# Patient Record
Sex: MALE | Race: WHITE | Employment: OTHER | URBAN - METROPOLITAN AREA
[De-identification: names, ages, dates, MRNs, and addresses within clinical notes are randomized per-mention and may not be internally consistent; named-entity substitution may affect disease eponyms.]

---

## 2017-10-04 ENCOUNTER — LAB ONLY (OUTPATIENT)
Dept: INTERNAL MEDICINE CLINIC | Age: 72
End: 2017-10-04

## 2017-10-04 DIAGNOSIS — E53.8 VITAMIN B 12 DEFICIENCY: ICD-10-CM

## 2017-10-04 DIAGNOSIS — Z00.00 ROUTINE GENERAL MEDICAL EXAMINATION AT A HEALTH CARE FACILITY: Primary | ICD-10-CM

## 2017-10-04 DIAGNOSIS — Z20.5 EXPOSURE TO HEPATITIS C: ICD-10-CM

## 2017-10-04 LAB
ALBUMIN SERPL-MCNC: 4.2 G/DL (ref 3.9–5.4)
ALKALINE PHOS POC: 113 U/L (ref 38–126)
ALT SERPL-CCNC: 29 U/L (ref 9–52)
AST SERPL-CCNC: 29 U/L (ref 14–36)
BACTERIA UA POCT, BACTPOCT: NORMAL
BILIRUB UR QL STRIP: NEGATIVE
BUN BLD-MCNC: 20 MG/DL (ref 9–20)
CALCIUM BLD-MCNC: 9.3 MG/DL (ref 8.4–10.2)
CASTS UA POCT: NORMAL
CHLORIDE BLD-SCNC: 106 MMOL/L (ref 98–107)
CHOLEST SERPL-MCNC: 187 MG/DL (ref 0–200)
CLUE CELLS, CLUEPOCT: NEGATIVE
CO2 POC: 27 MMOL/L (ref 22–32)
CREAT BLD-MCNC: 0.9 MG/DL (ref 0.8–1.5)
CRYSTALS UA POCT, CRYSPOCT: NEGATIVE
EGFR (POC): 85
EPITHELIAL CELLS POCT: NORMAL
GLUCOSE POC: 83 MG/DL (ref 75–110)
GLUCOSE UR-MCNC: NEGATIVE MG/DL
GRAN# POC: 4.3 K/UL (ref 2–7.8)
GRAN% POC: 73.8 % (ref 37–92)
HCT VFR BLD CALC: 42.4 % (ref 37–51)
HDLC SERPL-MCNC: 69 MG/DL (ref 35–130)
HGB BLD-MCNC: 14 G/DL (ref 12–18)
IRON POC: 94 UG/DL (ref 49–181)
IRON SATURATION POC: 31 % (ref 15–55)
KETONES P FAST UR STRIP-MCNC: NEGATIVE MG/DL
LDL CHOLESTEROL POC: 110.4 MG/DL (ref 0–130)
LY# POC: 1.3 K/UL (ref 0.6–4.1)
LY% POC: 23.2 % (ref 10–58.5)
MCH RBC QN: 31.8 PG (ref 26–32)
MCHC RBC-ENTMCNC: 32.9 G/DL (ref 30–36)
MCV RBC: 97 FL (ref 80–97)
MID #, POC: 0.1 K/UL (ref 0–1.8)
MID% POC: 3 % (ref 0.1–24)
MUCUS UA POCT, MUCPOCT: NORMAL
PH UR STRIP: 6 [PH] (ref 5–7)
PLATELET # BLD: 171 K/UL (ref 140–440)
POTASSIUM SERPL-SCNC: 4.5 MMOL/L (ref 3.6–5)
PROT SERPL-MCNC: 6.9 G/DL (ref 6.3–8.2)
PROT UR QL STRIP: NEGATIVE MG/DL
PSA SERPL-MCNC: 0.4 NG/ML (ref 0–4)
RBC # BLD: 4.38 M/UL (ref 4.2–6.3)
RBC UA POCT, RBCPOCT: NORMAL
SODIUM SERPL-SCNC: 146 MMOL/L (ref 137–145)
SP GR UR STRIP: 1.02 (ref 1.01–1.02)
TCHOL/HDL RATIO (POC): 2.7 (ref 0–4)
TIBC POC: 306 UG/DL (ref 260–462)
TOTAL BILIRUBIN POC: 0.7 MG/DL (ref 0.2–1.3)
TRICH UA POCT, TRICHPOC: NEGATIVE
TRIGL SERPL-MCNC: 38 MG/DL (ref 0–200)
TSH BLD-ACNC: 1.81 UIU/ML (ref 0.4–4.2)
UA UROBILINOGEN AMB POC: NORMAL (ref 0.2–1)
URINALYSIS CLARITY POC: CLEAR
URINALYSIS COLOR POC: NORMAL
URINE BLOOD POC: NEGATIVE
URINE CULT COMMENT, POCT: NORMAL
URINE LEUKOCYTES POC: NEGATIVE
URINE NITRITES POC: NEGATIVE
VITAMIN D POC: 44.3 NG/ML (ref 30–96)
VLDLC SERPL CALC-MCNC: 7.6 MG/DL
WBC # BLD: 5.7 K/UL (ref 4.1–10.9)
WBC UA POCT, WBCPOCT: 0
YEAST UA POCT, YEASTPOC: NEGATIVE

## 2017-10-05 LAB
HCV AB S/CO SERPL IA: <0.1 S/CO RATIO (ref 0–0.9)
VIT B12 SERPL-MCNC: >2000 PG/ML (ref 211–946)

## 2017-10-10 ENCOUNTER — OFFICE VISIT (OUTPATIENT)
Dept: INTERNAL MEDICINE CLINIC | Age: 72
End: 2017-10-10

## 2017-10-10 VITALS
WEIGHT: 155 LBS | OXYGEN SATURATION: 92 % | TEMPERATURE: 98.2 F | SYSTOLIC BLOOD PRESSURE: 152 MMHG | DIASTOLIC BLOOD PRESSURE: 88 MMHG | HEART RATE: 70 BPM

## 2017-10-10 DIAGNOSIS — Z00.00 ANNUAL PHYSICAL EXAM: ICD-10-CM

## 2017-10-10 DIAGNOSIS — M48.56XS COMPRESSION FRACTURE OF LUMBAR SPINE, NON-TRAUMATIC, SEQUELA: Primary | ICD-10-CM

## 2017-10-10 DIAGNOSIS — M80.00XS AGE-RELATED OSTEOPOROSIS WITH CURRENT PATHOLOGICAL FRACTURE, SEQUELA: ICD-10-CM

## 2017-10-10 PROBLEM — M80.00XA AGE-RELATED OSTEOPOROSIS WITH CURRENT PATHOLOGICAL FRACTURE: Status: ACTIVE | Noted: 2017-10-10

## 2017-10-10 NOTE — PROGRESS NOTES
Mr. Charma Hamman is a 67year old white male who comes to the office today for an initial visit and a comprehensive medical evaluation. Past Medical History:  1. Parkinson's disease, diagnosed in . 2. Mitral valve prolapse, diagnosed by echocardiography. 3. Recurrent prostatitis, followed annually by Dr. Sushma Lozano. 4. He is status post appendectomy. 5. He is followed annually by Dr. Sabra Horta and has had several skin cancers removed, including Moh's procedure by Dr. Warden Villalobos for a basal cell cancer on his nose. 6. He sustained a compression fracture of L2 and L4 in 2016 while doing yoga. He was seen by Dr. Magaly Melendez and Dr. Kelly Hernandez. I reviewed his MRI and it said it was an osteoporotic pathological fracture. Current Medications:  1. Trihexyphenidyl 2 mg t.i.d.  2. Vitamin D 5,000 units daily. 3. Milk thistle 250 mg daily. 4. Turmeric 1,000 mg daily. 5. Plankton, one daily. 6. Vitamin B12, one daily. 7. Glutathione nasal spray 200 mg t.i.d.  8. Curcumin 1,000 mg daily. 9. Saw palmetto, one daily. 10. Lutein, one daily. 11. Selenium, one daily. 12. Aspirin 81 mg daily. Drug Allergies:  None. Social History:  He has a masters degree in forestry from [employer]. He has an undergraduate degree from [employer]. He is semi retired. He was in the [employer]. He also worked for [employer] for a brief time. He used to smoke a pipe, but quit many years ago as he was an avid runner and a marathon runner at times. He used to have a cocktail every night, but quit that when he was diagnosed with Parkinson's on the advice of his neurologist.  He is . He has one son, age 36, and one granddaughter. Family History:  His father was diagnosed with Parkinson's disease at age 72 and  from complications of same at age 80. His mother  at age 80. She'd had colon surgery for colon cancer 20 years prior. Mr. Charma Hamman is an only child.     Review of Systems:  Mr. Charma Hamman has been getting regularly colonoscopies, last one was done by Dr. Ember Tejeda. He is due for one now and plans to schedule it. His back pain no longer bothers him. As part of his physical therapy regimen for yoga, he does 8-12 60 yard sprints a day and does yoga. He's also getting ready to start a program called Sweating Out Parkinson's Disease. He denies exertional chest pain, chest pressure, chest tightness, shortness of breath, PND, orthopnea, ankle edema or palpitations. When he doesn't have prostatitis his voiding is normal.  He doesn't have dysphagia. He's lost about 20 pounds since being diagnosed with Parkinson's disease. Physical Examination:  GENERAL:  HT: 5'10\". WT: 155. BP: 152/88 initially, subsequently 130/80. T: 98.2. P: 70 and regular. O2 saturation on room air: 92%. He is an elderly white male with resting tremor of his hands and masked-like facies of Parkinson's.  he is awake, alert, oriented x four and in no distress. HEENT:  Head normocephalic, atraumatic. Eyes - pupils midrange, equal directly and consensually. Extraocular movements are normal.  Ears show bilateral cerumen impaction. Throat is normal.  NECK:  Without JVD, adenopathy, thyromegaly or carotid bruits. LUNGS:  Clear. HEART:   Quiet precordium. Regular rhythm. There is a prominent midsystolic ejection click at the LLSB. There is no associated murmur. ABDOMEN:  Soft, non tender. No detectable hepatosplenomegaly, no abdominal masses, bruits or ascites. RECTAL:  Examination deferred to Dr. Iam Peraza. EXTREMITIES:  Without edema. Pulses are normal.  SKIN:  No suspicious lesions. NEUROLOGICAL:  He has a severe resting tremor. He has cogwheel rigidity. He has masked-like facies. Gait is shuffling. All of these are consistent with moderately severe Parkinson's. Studies:  PFT is normal.  Hearing testing is relatively normal.  EKG - normal sinus rhythm, first degree heart block, pr interval= 244.  Tracing is otherwise normal.  I have no prior tracing available for comparison. Laboratory:  HGB and iron stores are normal.  Blood sugar, potassium, renal function, calcium level and LFTs are normal.  Total cholesterol 187, triglycerides 38, , HDL 69. PSA 0.4. Vitamin D 44. TSH 1.8. Vitamin B12 level is greater than 2,000. Hepatitis C virus antibody negative. Impression:  1. Advanced Parkinson's disease. 2. Osteoporosis with nontraumatic compression fractures L2, 3, 4. (Xrays of lumbar spine today show healed compressions of 2, 3 and 4.)  3. Status post excision multiple skin cancers. 4. Mitral valve prolapse, asymptomatic. 5. Recurrent prostatitis  6. Status post appendectomy. Plan:  1. Request complete records from Dr. Fermin Bell. I'll review those records and check on his immunization status. 2. He's gotten the paperwork from Dr. Loraine Hernandez office for the colonoscopy. I told him to go ahead and fill it out and mail it in and ask for Dr. John Suresh. 3. He needs a bone density test and likely will need treatment. Will make arrangements for that once our equipment is functional..  4. We talked a bit about conventional treatment for Parkinson's with Sinemet. He says he has a prescription, but he's never taken it. I encouraged him to try it. 5. Will schedule a follow up visit after I receive his old medical records.

## 2017-10-10 NOTE — PROGRESS NOTES
Patient is new to this practice   Reviewed record in preparation for visit and have obtained necessary documentation. Identified pt with two pt identifiers(name and ). Chief Complaint   Patient presents with    Annual Exam        Coordination of Care Questionnaire:  :     1) Have you been to an emergency room, urgent care clinic since your last visit? No     Hospitalized since your last visit? No             2) Have you seen or consulted any other health care providers outside of [address] [place] [address] since your last visit?  No

## 2017-10-10 NOTE — LETTER
10/10/2017 4:54 PM 
 
Mr. Michela Denise 
[address] [facility] [address] [facility] [address] Dear Mr. Azul Callahan: I am writing this letter as a follow-up to your recent physical examination. I have enclosed copies of your lab work for your review. After going over this information, if you have any questions please give me a call. Your current active and past medical problems include: 1. Parkinson's disease, diagnosed in 2014. 2. Mitral valve prolapse, which is asymptomatic, diagnosed by auscultation and echocardiography. 3. Recurrent prostatitis, for which you are followed by Dr. Saloni Sarah. 4. You had an appendectomy as a child. 5. You've had several skin cancers removed by Dr. Jennifer Anaya and Dr. Beryl Dillard and are followed on a regular basis. 6. Osteoporosis with nontraumatic compression fractures of the second, third and fourth lumbar vertebra. Juanis Damon Your current medications are: 1. Trihexyphenidyl 2 mg t.i.d. 2. Glutathione nasal spray 200 mg three times daily. 3. You are on a variety of supplements, including curcumin 1,000 mg daily, saw palmetto, one daily, lutein, one daily, selenium, one daily, vitamin D 5,000 units daily, milk thistle 250 mg daily, turmeric 1,000 mg daily, jessica plankton, one daily, aspirin 81 mg daily. You have been taking a B12 supplement every day, but your B12 level is quite high and I think you can discontinue that. On physical examination, your height was 5'10\". Your weight was 155. Your blood pressure was initially slightly high, but when I checked it it was perfectly normal at 130/80. Your pulse was 60 and regular. Abnormalities on your physical examination relate to your mitral valve, which does have a loud click when I listen to it. There is no associated murmur, so the mitral valve is functioning normally. This is not causing you any symptoms and requires no further evaluation. You also have fairly typical changes of moderate Parkinson's disease.  Your resting electrocardiogram was normal except you have something called a first degree heart block. That is related to aging of the electrical system of your heart. That is not causing you any symptoms and requires no treatment at this time. Pulmonary function testing was normal.  Hearing testing shows a mild symmetrical high frequency hearing loss. Your laboratory studies revealed your hemoglobin and iron stores to be normal.  Your blood sugar, potassium, kidney function, calcium level and liver function tests were normal.  Total cholesterol was 187, triglycerides were 38, LDL (bad cholesterol) was 110 and HDL (good cholesterol) was 69. That is a low risk profile. An HDL level over 60 is what is called positive risk factor for heart disease in that it protects your heart from accumulating plaque. PSA, (test for prostate cancer), is quite low at 0.4. Vitamin D level is in the physiologic range at 44. Thyroid function is normal. Hepatitis C antibody is negative. Your B12 level is over 2,000 and as noted above you can stop that supplement. Your urine specimen is normal. 
 
You are overdue for your colonoscopy, so go ahead and do the paperwork and request Dr. Leyla Marinelli. That should be done every five years as long as it's normal because of your family history. Your MRI from 2016 said you are osteoporotic. We need to quantify the osteoporosis by doing a bone density test. Once my machine is functional we will get that done as soon as possible and start you on treatment. I have requested records from Dr. Ana Wesley. I will review that, check your immunization status and make further recommendations in that regard. As we discussed in the office, I think it would greatly benefit you to take the Sinemet tablets that you have. It would delay progression of your symptoms. I certainly think you should continue this vigorous physical regimen that you are doing.   I am certain that is helping you as well.  Thank you for your confidence in joining the practice. I will be in touch with you after I receive your records and review them. Sincerely, Kory Villalba MD

## 2017-10-31 ENCOUNTER — TELEPHONE (OUTPATIENT)
Dept: INTERNAL MEDICINE CLINIC | Age: 72
End: 2017-10-31

## 2017-11-06 ENCOUNTER — TELEPHONE (OUTPATIENT)
Dept: INTERNAL MEDICINE CLINIC | Age: 72
End: 2017-11-06

## 2017-11-06 NOTE — TELEPHONE ENCOUNTER
Patient informed  Needs prevnar 13  Flu vaccine  Zostavax   Appointment scheduled for Dec 6   Xray will call patient to schedule bone density when macine is set up

## 2017-12-08 ENCOUNTER — OFFICE VISIT (OUTPATIENT)
Dept: INTERNAL MEDICINE CLINIC | Age: 72
End: 2017-12-08

## 2017-12-08 VITALS
HEART RATE: 56 BPM | TEMPERATURE: 97.8 F | WEIGHT: 153 LBS | SYSTOLIC BLOOD PRESSURE: 186 MMHG | DIASTOLIC BLOOD PRESSURE: 90 MMHG | HEIGHT: 72 IN | BODY MASS INDEX: 20.72 KG/M2

## 2017-12-08 DIAGNOSIS — I34.1 MVP (MITRAL VALVE PROLAPSE): ICD-10-CM

## 2017-12-08 DIAGNOSIS — M80.00XS AGE-RELATED OSTEOPOROSIS WITH CURRENT PATHOLOGICAL FRACTURE, SEQUELA: ICD-10-CM

## 2017-12-08 DIAGNOSIS — M48.56XS COMPRESSION FRACTURE OF LUMBAR SPINE, NON-TRAUMATIC, SEQUELA: ICD-10-CM

## 2017-12-08 DIAGNOSIS — G20 PARKINSON'S DISEASE (TREMOR, STIFFNESS, SLOW MOTION, UNSTABLE POSTURE) (HCC): ICD-10-CM

## 2017-12-08 DIAGNOSIS — Z23 ENCOUNTER FOR IMMUNIZATION: Primary | ICD-10-CM

## 2017-12-08 NOTE — PROGRESS NOTES
Chief Complaint   Patient presents with    Tremors    Immunization/Injection     1. Have you been to the ER, urgent care clinic since your last visit? Hospitalized since your last visit? NO    2. Have you seen or consulted any other health care providers outside of the [address] [place] [address] since your last visit? Include any pap smears or colon screening.  NO

## 2017-12-08 NOTE — PROGRESS NOTES
Mr. Homar Perez comes into the office today in follow-up of:  1. Parkinson's disease. 2. Hypertension. 3. Osteoporosis with history of lumbar vertebral compression fractures. He remains on a vigorous physical schedule. He would like to begin a program that includes non-contact boxing. He has not scheduled his colonoscopy yet but plans to. Physical Examination:  T: 97.8. BP: initially 186/90; I got 130/80. I believe his severe tremor affects the automatic blood pressure cuff. P: 56 and regular. W: 153 lb. CHEST:  Lungs are clear. Heart regular rhythm. There is a mid-systolic ejection click and now there is an associated 2/6 mitral insufficiency murmur. NEURO:  Neurological examination is unchanged. Plan:    1. We have scheduled a bone density test for January 10th. I will call him with the results and make further recommendations. I have suggested that we do an echocardiogram to further evaluate the mitral valve and he is in agreement. 2. I signed the form to allow him to do the boxing class. 3. After I review the bone density test, I will call him an arrange follow-up. 4. Flu vaccine and [id number] given today.

## 2018-01-31 NOTE — PERIOP NOTES
[facility]  Ambulatory Surgery Unit  Pre-operative Instructions for Endo Procedures    Procedure Date  2/7/18            Tentative Arrival Time 0730      1. On the day of your procedure, please report to the Ambulatory Surgery Unit Registration Desk and sign in at your designated time. The Ambulatory Surgery Unit is located in [facility] on the [facility] side of the [facility] across from the [address] [place] [address]. Please have all of your health insurance cards and a photo ID. 2. You must have someone with you to drive you home, as you should not drive a car for 24 hours following anesthesia. Please make arrangements for a responsible adult friend or family member to stay with you for at least the first 24 hours after your procedure. 3. Do not have anything to eat or drink (including water, gum, mints, coffee, juice) after midnight   2/6/18. This may not apply to medications prescribed by your physician. (Please note below the special instructions with medications to take the morning of your procedure.)    4. If applicable, follow the clear liquid diet and bowel prep instructions provided by your physician's office. If you do not have this information, or have any questions, please contact your physician's office. 5. We recommend you do not drink any alcoholic beverages for 24 hours before and after your procedure. 6. Contact your surgeons office for instructions on the following medications: non-steroidal anti-inflammatory drugs (i.e. Advil, Aleve), vitamins, and supplements. (Some surgeons will want you to stop these medications prior to surgery and others may allow you to take them)   **If you are currently taking Plavix, Coumadin, Aspirin and/or other blood-thinning agents, contact your surgeon for instructions. ** Your surgeon will partner with the physician prescribing these medications to determine if it is safe to stop or if you need to continue taking.  Please do not stop taking these medications without instructions from your surgeon. 7. In an effort to help prevent surgical site infection, we ask that you shower with an anti-bacterial soap (i.e. Dial or Safeguard) on the morning of your procedure. Do not apply any lotions, powders, or deodorants after showering. 8. Wear comfortable clothes. Wear glasses instead of contacts. Do not bring any jewelry or money (other than copays or fees as instructed). Do not wear make-up, particularly mascara, the morning of your procedure. Wear your hair loose or down, no ponytails, buns, kamari pins or clips. All body piercings must be removed. 9. You should understand that if you do not follow these instructions your procedure may be cancelled. If your physical condition changes (i.e. fever, cold or flu) please contact your surgeon as soon as possible. 10. It is important that you be on time. If a situation occurs where you may be late, or if you have any questions or problems, please call [phone]. 11. Your procedure time may be subject to change. You will receive a phone call the day prior to confirm your arrival time. Special Instructions: Take all medications and inhalers, as prescribed, on the morning of surgery with a sip of water EXCEPT: none      I understand a pre-operative phone call will be made to verify my procedure time. In the event that I am not available, I give permission for a message to be left on my answering service and/or with another person?       Yes     (instructions given verbally during phone assessment- pt voiced understanding)     ___________________      ___________________      ___________________  (Signature of Patient)          (Witness)                   (Date and Time)

## 2018-02-06 ENCOUNTER — ANESTHESIA EVENT (OUTPATIENT)
Dept: SURGERY | Age: 73
End: 2018-02-06
Payer: MEDICARE

## 2018-02-07 ENCOUNTER — HOSPITAL ENCOUNTER (OUTPATIENT)
Age: 73
Setting detail: OUTPATIENT SURGERY
Discharge: HOME | End: 2018-02-07
Attending: COLON & RECTAL SURGERY | Admitting: COLON & RECTAL SURGERY
Payer: MEDICARE

## 2018-02-07 ENCOUNTER — ANESTHESIA (OUTPATIENT)
Dept: SURGERY | Age: 73
End: 2018-02-07
Payer: MEDICARE

## 2018-02-07 VITALS
RESPIRATION RATE: 17 BRPM | OXYGEN SATURATION: 97 % | TEMPERATURE: 98 F | WEIGHT: 156.2 LBS | DIASTOLIC BLOOD PRESSURE: 74 MMHG | BODY MASS INDEX: 21.16 KG/M2 | HEIGHT: 72 IN | HEART RATE: 62 BPM | SYSTOLIC BLOOD PRESSURE: 104 MMHG

## 2018-02-07 PROBLEM — Z80.0 FAMILY HISTORY OF COLON CANCER REQUIRING SCREENING COLONOSCOPY: Status: ACTIVE | Noted: 2018-02-07

## 2018-02-07 PROBLEM — S91.339A: Status: ACTIVE | Noted: 2018-02-07

## 2018-02-07 PROCEDURE — 76030000000 HC AMB SURG OR TIME 0.5 TO 1: Performed by: COLON & RECTAL SURGERY

## 2018-02-07 PROCEDURE — 76060000061 HC AMB SURG ANES 0.5 TO 1 HR: Performed by: COLON & RECTAL SURGERY

## 2018-02-07 PROCEDURE — 74011250636 HC RX REV CODE- 250/636: Performed by: ANESTHESIOLOGY

## 2018-02-07 PROCEDURE — 77030021352 HC CBL LD SYS DISP COVD -B: Performed by: COLON & RECTAL SURGERY

## 2018-02-07 PROCEDURE — 76210000050 HC AMBSU PH II REC 0.5 TO 1 HR: Performed by: COLON & RECTAL SURGERY

## 2018-02-07 PROCEDURE — 74011000250 HC RX REV CODE- 250

## 2018-02-07 PROCEDURE — 77030020255 HC SOL INJ LR 1000ML BG: Performed by: COLON & RECTAL SURGERY

## 2018-02-07 PROCEDURE — 74011250636 HC RX REV CODE- 250/636

## 2018-02-07 RX ORDER — CEPHALEXIN 250 MG/1
500 CAPSULE ORAL 4 TIMES DAILY
Qty: 56 CAP | Refills: 0 | Status: SHIPPED | OUTPATIENT
Start: 2018-02-07 | End: 2018-02-14

## 2018-02-07 RX ORDER — SODIUM CHLORIDE, SODIUM LACTATE, POTASSIUM CHLORIDE, CALCIUM CHLORIDE 600; 310; 30; 20 MG/100ML; MG/100ML; MG/100ML; MG/100ML
25 INJECTION, SOLUTION INTRAVENOUS CONTINUOUS
Status: DISCONTINUED | OUTPATIENT
Start: 2018-02-07 | End: 2018-02-07 | Stop reason: HOSPADM

## 2018-02-07 RX ORDER — DIPHENHYDRAMINE HYDROCHLORIDE 50 MG/ML
12.5 INJECTION, SOLUTION INTRAMUSCULAR; INTRAVENOUS AS NEEDED
Status: DISCONTINUED | OUTPATIENT
Start: 2018-02-07 | End: 2018-02-07 | Stop reason: HOSPADM

## 2018-02-07 RX ORDER — SODIUM CHLORIDE 0.9 % (FLUSH) 0.9 %
5-10 SYRINGE (ML) INJECTION AS NEEDED
Status: DISCONTINUED | OUTPATIENT
Start: 2018-02-07 | End: 2018-02-07 | Stop reason: HOSPADM

## 2018-02-07 RX ORDER — PROPOFOL 10 MG/ML
INJECTION, EMULSION INTRAVENOUS AS NEEDED
Status: DISCONTINUED | OUTPATIENT
Start: 2018-02-07 | End: 2018-02-07 | Stop reason: HOSPADM

## 2018-02-07 RX ORDER — CEPHALEXIN 250 MG/1
500 CAPSULE ORAL 4 TIMES DAILY
Qty: 28 CAP | Refills: 0 | Status: SHIPPED | OUTPATIENT
Start: 2018-02-07 | End: 2018-02-07

## 2018-02-07 RX ORDER — PHENYLEPHRINE HCL IN 0.9% NACL 0.4MG/10ML
SYRINGE (ML) INTRAVENOUS AS NEEDED
Status: DISCONTINUED | OUTPATIENT
Start: 2018-02-07 | End: 2018-02-07 | Stop reason: HOSPADM

## 2018-02-07 RX ORDER — LIDOCAINE HYDROCHLORIDE 20 MG/ML
INJECTION, SOLUTION EPIDURAL; INFILTRATION; INTRACAUDAL; PERINEURAL AS NEEDED
Status: DISCONTINUED | OUTPATIENT
Start: 2018-02-07 | End: 2018-02-07 | Stop reason: HOSPADM

## 2018-02-07 RX ORDER — FENTANYL CITRATE 50 UG/ML
25 INJECTION, SOLUTION INTRAMUSCULAR; INTRAVENOUS
Status: DISCONTINUED | OUTPATIENT
Start: 2018-02-07 | End: 2018-02-07 | Stop reason: HOSPADM

## 2018-02-07 RX ORDER — LIDOCAINE HYDROCHLORIDE 10 MG/ML
0.1 INJECTION, SOLUTION EPIDURAL; INFILTRATION; INTRACAUDAL; PERINEURAL AS NEEDED
Status: DISCONTINUED | OUTPATIENT
Start: 2018-02-07 | End: 2018-02-07 | Stop reason: HOSPADM

## 2018-02-07 RX ORDER — SODIUM CHLORIDE 0.9 % (FLUSH) 0.9 %
5-10 SYRINGE (ML) INJECTION EVERY 8 HOURS
Status: DISCONTINUED | OUTPATIENT
Start: 2018-02-07 | End: 2018-02-07 | Stop reason: HOSPADM

## 2018-02-07 RX ORDER — ONDANSETRON 2 MG/ML
4 INJECTION INTRAMUSCULAR; INTRAVENOUS AS NEEDED
Status: DISCONTINUED | OUTPATIENT
Start: 2018-02-07 | End: 2018-02-07 | Stop reason: HOSPADM

## 2018-02-07 RX ADMIN — Medication 120 MCG: at 09:38

## 2018-02-07 RX ADMIN — PROPOFOL 100 MG: 10 INJECTION, EMULSION INTRAVENOUS at 09:22

## 2018-02-07 RX ADMIN — PROPOFOL 50 MG: 10 INJECTION, EMULSION INTRAVENOUS at 09:39

## 2018-02-07 RX ADMIN — SODIUM CHLORIDE, SODIUM LACTATE, POTASSIUM CHLORIDE, AND CALCIUM CHLORIDE 25 ML/HR: 600; 310; 30; 20 INJECTION, SOLUTION INTRAVENOUS at 08:35

## 2018-02-07 RX ADMIN — LIDOCAINE HYDROCHLORIDE 40 MG: 20 INJECTION, SOLUTION EPIDURAL; INFILTRATION; INTRACAUDAL; PERINEURAL at 09:22

## 2018-02-07 NOTE — INTERVAL H&P NOTE
H&P Update:  Joaquina Telles was seen and examined. History and physical has been reviewed. Significant clinical changes have occurred as noted:  ENT clear;  Lungs clear;  Cor without failure; Abdomen benign;  Rectal pending c-scope; Extremities significant for stepping on a nail left forefoot a couple days ago, with some mild erythema around the puncture site -- states he is up to date on tetanus, but should get ABO to cover the site -- will prescribe today at discharge;  Neuro with Parkinson tremor.     Signed By: Bere Allen MD     February 7, 2018 8:15 AM

## 2018-02-07 NOTE — DISCHARGE INSTRUCTIONS
Nydia Bowser  [id number]  1945    COLON DISCHARGE INSTRUCTIONS  Discomfort:  Redness at IV site- apply warm compress to area; if redness or soreness persist- contact your physician  There may be a slight amount of blood passed from the rectum  Gaseous discomfort- walking, belching will help relieve any discomfort  You may not operate a vehicle for 24 hours  You may not engage in an occupation involving machinery or appliances for rest of today  You may not drink alcoholic beverages for at least 24 hours  Avoid making any critical decisions for at least 24 hour  DIET:   Regular diet. - however -  remember your colon is empty and a heavy meal will produce gas. Avoid these foods:  vegetables, fried / greasy foods, carbonated drinks for today     ACTIVITY:  You may not  resume your normal daily activities it is recommended that you spend the remainder of the day resting -  avoid any strenuous activity. CALL M.D. ANY SIGN OF:   Increasing pain, nausea, vomiting  Abdominal distension (swelling)  New increased bleeding (oral or rectal)  Fever (chills)  Pain in chest area  Bloody discharge from nose or mouth  Shortness of breath    You may  take any Advil, Aspirin, Ibuprofen, Motrin, Aleve, or Goodys or Tylenol as needed for pain. Post procedure diagnosis:  Diverticulosis but overall negative exam  Follow-up Instructions:   Call Dr. Rayna Cobb if any questions  Telephone #  [phone]  Additional instructions ; Keflex 2 capsules 4 times each day for one week;  Consider next colonoscopy in 5 years.                   Nydia Bowser  [id number]  1945        DISCHARGE SUMMARY from Nurse    The following personal items collected during your admission are returned to you:   Dental Appliance: Dental Appliances: None  Vision: Visual Aid: Glasses, At home  Hearing Aid:    Jewelry:    Clothing:    Other Valuables:    Valuables sent to safe:      DO NOT TAKE TYLENOL/ACETAMINOPHEN WITH PERCOCET, LORTAB, [address] [place]. TAKE NARCOTIC PAIN MEDICATIONS WITH FOOD     If given 2 pain narcotics do NOT take together! Narcotics tend to be constipating, we suggest taking a stool softener such as Colace or Miralax (follow package instructions). DO NOT DRIVE WHILE TAKING NARCOTIC PAIN MEDICATIONS. DO NOT TAKE SLEEPING MEDICATIONS OR ANTIANXIETY MEDICATIONS WHILE TAKING NARCOTIC PAIN MEDICATIONS,  ESPECIALLY THE NIGHT OF ANESTHESIA. CPAP PATIENTS BE SURE TO WEAR MACHINE WHENEVER NAPPING OR SLEEPING. DISCHARGE SUMMARY from Nurse    The following personal items collected during your admission are returned to you:   Dental Appliance: Dental Appliances: None  Vision: Visual Aid: Glasses, At home  Hearing Aid:    Jewelry:    Clothing:    Other Valuables:    Valuables sent to safe:        PATIENT INSTRUCTIONS:    After General Anesthesia or Intravenous Sedation, for 24 hours or while taking prescription Narcotics:        Someone should be with you for the next 24 hours. For your own safety, a responsible adult must drive you home. · Limit your activities  · Recommended activity: Rest today, up with assistance today. Do not climb stairs or shower unattended for the next 24 hours. · Please start with a soft bland diet and advance as tolerated (no nausea) to regular diet. · If you have a sore throat you should try the following: fluids, warm salt water gargles, or throat lozenges. If it does not improve after several days please follow up with your primary physician. · Do not drive and operate hazardous machinery  · Do not make important personal or business decisions  · Do  not drink alcoholic beverages  · If you have not urinated within 8 hours after discharge, please contact your surgeon on call.     Report the following to your surgeon:  · Excessive pain, swelling, redness or odor of or around the surgical area  · Temperature over 100.5  · Nausea and vomiting lasting longer than 4 hours or if unable to take medications  · Any signs of decreased circulation or nerve impairment to extremity: change in color, persistent  numbness, tingling, coldness or increase pain      · You will receive a Post Operative Call from one of the Recovery Room Nurses on the day after your surgery to check on you. It is very important for us to know how you are recovering after your surgery. If you have an issue or need to speak with someone, please call your surgeon, do not wait for the post operative call. · You may receive an e-mail or letter in the mail from [facility] regarding your experience with us in the Ambulatory Surgery Unit. Your feedback is valuable to us and we appreciate your participation in the survey. · If the above instructions are not adequate, please contact Rupali Rhodes RN, [facility] anesthesia Nurse Manager or our Anesthesiologist, at [phone]. If you are having problems after your surgery, call the physician at his office number. · We wish you a speedy recovery ? What to do at Home:      *  Please give a list of your current medications to your Primary Care Provider. *  Please update this list whenever your medications are discontinued, doses are      changed, or new medications (including over-the-counter products) are added. *  Please carry medication information at all times in case of emergency situations. These are general instructions for a healthy lifestyle:    No smoking/ No tobacco products/ Avoid exposure to second hand smoke    Surgeon General's Warning:  Quitting smoking now greatly reduces serious risk to your health.     Obesity, smoking, and sedentary lifestyle greatly increases your risk for illness    A healthy diet, regular physical exercise & weight monitoring are important for maintaining a healthy lifestyle    You may be retaining fluid if you have a history of heart failure or if you experience any of the following symptoms:  Weight gain of 3 pounds or more overnight or 5 pounds in a week, increased swelling in our hands or feet or shortness of breath while lying flat in bed. Please call your doctor as soon as you notice any of these symptoms; do not wait until your next office visit. Recognize signs and symptoms of STROKE:    B - Balance  E - Eyes    F-  Face looks uneven    A-  Arms unable to move or move even    S-  Speech slurred or non-existent    T-  Time-call 911 as soon as signs and symptoms begin-DO NOT go       Back to bed or wait to see if you get better-TIME IS BRAIN. If you have not received your influenza and/or pneumococcal vaccine, please follow up with your primary care physician. The discharge information has been reviewed with the patient and caregiver. The patient and caregiver verbalized understanding.

## 2018-02-07 NOTE — PERIOP NOTES
1000 Pt arrived to PACU. Pt drowsy. C/o urge to have a bowel movement. He wants to get up and use the bathroom. Advised him to wait unitl he is more awake. Friend brought to bedside. 1005 Some loose stool in his bed. Pt brought to bathroom and cleaned up. He was able to void. [address] instructions reviewed with ride. Abd soft. Pt meets PACU protocol.

## 2018-02-07 NOTE — PERIOP NOTES
Lora Ruddy  1945  [id number]    Situation:  Verbal report given from: A. [employer] [place] [employer] CRNA  Procedure: Procedure(s):  COLONOSCOPY    Background:    Preoperative diagnosis: FAMILY HX OF COLON CANCER    Postoperative diagnosis: Diverticulosis but overall negative exam    :  Dr. Jennifer Gage    Assistant(s): Circ-1: Mee Cnode  Circ-2: Jefferson Devlin RN  Scrub Tech-1: Jed Vance    Specimens: * No specimens in log *    Assessment:  Intra-procedure medications   Propofol  mg      Anesthesia gave intra-procedure sedation and medications, see anesthesia flow sheet     Intravenous fluids: LR@ KVO     Vital signs stable     Abdominal assessment: round and soft       Recommendation:    Permission to share finding with family or friend yes    All side rails up, bed in low position, wheels locked. Nurse at bedside.

## 2018-02-07 NOTE — H&P
[facility]  ACUTE CARE HISTORY AND PHYSICAL    Name:VIGNESH Tejeda  MR#: [id number]  : 1945  ACCOUNT #: [de-identified]   DATE OF SERVICE: 2018    CHIEF COMPLAINT:  Family history of colon cancer, now for followup 5-year interval exam.    HISTORY OF PRESENT ILLNESS:  The patient is a very nice 68-year-old gentleman who is in today for followup screening colonoscopy. His last exam was 5 years ago. He has a history of colon cancer in his family. He has been apprised of the risks and would like to proceed, and we will move forward with exam today. PAST MEDICAL HISTORY:  Significant for parkinsonism. He has also had a tonsillectomy in addition to his appendectomy. He has a history of Parkinson's disease. MEDICATIONS:  None. ALLERGIES:  NONE. SOCIAL HISTORY:  Significant for no alcohol. He has not smoked for decades. FAMILY HISTORY:  Significant for colon cancer in his mother, Parkinson's disease in his father. REVIEW OF SYSTEMS:  Significant for some sore muscles. He has had a 20-pound weight loss. Otherwise, his 10-system review is negative. PHYSICAL EXAMINATION:  Pending. ASSESSMENT:  A 68-year-old gentleman for screening colonoscopy with a family history of colon cancer. There is a 5-year interval exam.    RECOMMENDATIONS AND PLAN:  He is aware of the risks of colonoscopy including bleeding, perforation, and the risk of missing small polyps. He understands and is agreeable to proceed, and we will move forward with the exam today.       MD FANG Lambert / Sandeep Patel  D: 2018 23:47     T: 2018 00:08  JOB #: [id number]

## 2018-02-07 NOTE — OP NOTES
[facility]  OPERATIVE REPORT    Name:VIGNESH Watson  MR#: [id number]  : 1945  ACCOUNT #: [de-identified]   DATE OF SERVICE: 2018    PREOPERATIVE DIAGNOSIS:  Family history of colon cancer, now for 5-year interval exam.    POSTOPERATIVE DIAGNOSIS:  Family history of colon cancer, now for 5-year interval exam.  No evidence of any new colonic pathology. PROCEDURE PERFORMED:  Total colonoscopy to cecum. SURGEON:  Dr. Francesca Bumpers. ANESTHESIA:  IV sedation with propofol per anesthesia. ESTIMATED BLOOD LOSS:  None. SPECIMENS REMOVED:  None. COMPLICATIONS:  None. INDICATIONS:  The patient is a very nice 70-year-old gentleman with a history of Parkinson's. He is in today for screening colonoscopy as a 5-year interval for a family history of colonic polyps. He is aware of the risks of the procedure including bleeding, perforation and the risk of missing small polyps and he is agreeable to proceed. PROCEDURE:  After uneventful induction of IV sedation, the patient was placed in the left lateral position and the pediatric 180 stiffening scope passed through the colon to the cecal cap without difficulty. Prep was marginal, but adequate. The cecum and ileocecal valve were identified and the scope was slowly and carefully pulled back. The ascending, transverse, descending, and sigmoid were normal.  No polyps or diverticula were seen. No inflammation was present. The scope was pulled back to the rectum, which was unremarkable. Air was aspirated, scope was removed and the exam terminated. He tolerated the exam well, remained stable and left with a benign abdomen. He ought to undergo a followup exam in 5 years if his general physical condition from his Parkinson's allows.       MD FANG Polanco / SRINIVASAN  D: 2018 10:06     T: 2018 11:57  JOB #: [id number]  CC: Mary Grace Howard MD

## 2018-02-07 NOTE — ANESTHESIA PREPROCEDURE EVALUATION
Anesthetic History   No history of anesthetic complications            Review of Systems / Medical History  Patient summary reviewed, nursing notes reviewed and pertinent labs reviewed    Pulmonary  Within defined limits                 Neuro/Psych             Comments: Parkinson's Dz Cardiovascular      Valvular problems/murmurs: mitral insufficiency            Exercise tolerance: >4 METS  Comments: 12/17 ECHO = EF 65%, moderate MR   GI/Hepatic/Renal  Within defined limits              Endo/Other  Within defined limits           Other Findings              Physical Exam    Airway  Mallampati: II  TM Distance: > 6 cm  Neck ROM: decreased range of motion   Mouth opening: Normal     Cardiovascular    Rhythm: regular  Rate: normal         Dental    Dentition: Caps/crowns     Pulmonary  Breath sounds clear to auscultation               Abdominal  GI exam deferred       Other Findings            Anesthetic Plan    ASA: 3  Anesthesia type: general and total IV anesthesia          Induction: Intravenous  Anesthetic plan and risks discussed with: Patient

## 2018-02-07 NOTE — BRIEF OP NOTE
BRIEF OPERATIVE NOTE    Date of Procedure: 2/7/2018   Preoperative Diagnosis: FAMILY HX OF COLON CANCER  Postoperative Diagnosis: Diverticulosis but overall negative exam    Procedure(s):  COLONOSCOPY  Surgeon(s) and Role:     * Abdiel Durand MD - Primary         Assistant Staff: None      Surgical Staff:  Circ-1: Dalila Resendiz  Circ-2: Raymond Tovar RN  Scrub Tech-1: Charlotte Pratt  Event Time In   Incision Start 8307   Incision Close 6085     Anesthesia: MAC   Estimated Blood Loss: none  Specimens: * No specimens in log *   Findings: normal exam;  Marginal but adequate prep   Complications: none  Implants: * No implants in log *

## 2018-02-07 NOTE — ANESTHESIA POSTPROCEDURE EVALUATION
Post-Anesthesia Evaluation and Assessment    Patient: Santo Roe MRN: [id number]  SSN: [id number]    YOB: 1945  Age: 68 y.o. Sex: male       Cardiovascular Function/Vital Signs  Visit Vitals    /65 (BP 1 Location: Left arm)    Pulse (!) 59    Temp 36.9 °C (98.4 °F)    Resp 17    Ht 6' (1.829 m)    Wt 70.9 kg (156 lb 3.2 oz)    SpO2 97%    BMI 21.18 kg/m2       Patient is status post general, total IV anesthesia anesthesia for Procedure(s):  COLONOSCOPY. Nausea/Vomiting: None    Postoperative hydration reviewed and adequate. Pain:  Pain Scale 1: Numeric (0 - 10) (02/07/18 0954)  Pain Intensity 1: 0 (02/07/18 0954)   Managed    Neurological Status:   Neuro (WDL): Within Defined Limits (02/07/18 0954)   At baseline    Mental Status and Level of Consciousness: Arousable    Pulmonary Status:   O2 Device: Room air (Simultaneous filing. User may not have seen previous data.) (02/07/18 0954)   Adequate oxygenation and airway patent    Complications related to anesthesia: None    Post-anesthesia assessment completed.  No concerns    Signed By: Piter King MD     February 7, 2018

## 2018-02-07 NOTE — PERIOP NOTES
Pt. W/left foot w/bandaid and wrapped w/tape. Pt. Stated stepped on a nail. Removed bandaid. Red around area. Dr. Zi Weinstein at bedside. Stated will give pt. Keflex to go home. Pt. States tetanus is up to date. Replaced new bandaid to bottom of left foot.

## 2018-02-07 NOTE — IP AVS SNAPSHOT
[address] [facility] 
[phone] Patient: Urmila Glynn MRN: [id number] YUM:[birth year] About your hospitalization You were admitted on:  February 7, 2018 You last received care in the:  [facility] PACU You were discharged on:  February 7, 2018 Why you were hospitalized Your primary diagnosis was:  Family History Of Colon Cancer Requiring Screening Colonoscopy Your diagnoses also included:  Puncture Wound To Foot Follow-up Information Follow up With Details Comments Contact Info MD Constantino Edwards [address] [facility] 
[phone] Discharge Orders None A check jose indicates which time of day the medication should be taken. My Medications START taking these medications Instructions Each Dose to Equal  
 Morning Noon Evening Bedtime  
 cephALEXin 250 mg capsule Commonly known as:  Ellie Short Your last dose was: Your next dose is: Take 2 Caps by mouth four (4) times daily for 7 days. Indications: Skin and Skin Structure Infection 500 mg CONTINUE taking these medications Instructions Each Dose to Equal  
 Morning Noon Evening Bedtime ALPHA LIPOIC ACID PO Your last dose was: Your next dose is: Take  by mouth daily. aspirin 325 mg tablet Commonly known as:  ASPIRIN Your last dose was: Your next dose is: Take 325 mg by mouth daily. 325 mg  
    
   
   
   
  
 cholecalciferol (VITAMIN D3) 5,000 unit Tab tablet Commonly known as:  VITAMIN D3 Your last dose was: Your next dose is: Take 5,000 Units by mouth two (2) times a week. 5000 Units CURCUMIN Your last dose was: Your next dose is: by Does Not Apply route daily. cyanocobalamin 1,000 mcg tablet Your last dose was: Your next dose is: Take 1,000 mcg by mouth daily. 1000 mcg FISH OIL PO Your last dose was: Your next dose is: Take 1 Tab by mouth daily. 1 Tab GINSENG PO Your last dose was: Your next dose is: Take 1 Dose by mouth daily. 1 Dose Xztxamls-Uxrw-Tloqfl-Hyalur Ac 929-166-82-2 mg Cap Your last dose was: Your next dose is: Take 1 Tab by mouth daily. 1 Tab GLUTATHIONE Your last dose was: Your next dose is:    
   
   
 600 mg by Nasal route daily. 600 mg  
    
   
   
   
  
 IDEBENONE (BULK) Your last dose was: Your next dose is: Take 1 Tab by mouth daily. 1 Tab LUTEIN-ZEAXANTHIN PO Your last dose was: Your next dose is: Take 1 Tab by mouth daily. 1 Tab LYSINE PO Your last dose was: Your next dose is: Take  by mouth daily. MILK THISTLE (BULK) Your last dose was: Your next dose is: Take 1 Tab by mouth daily. 1 Tab MSM PO Your last dose was: Your next dose is: Take 1 Tab by mouth daily. 1 Tab  
    
   
   
   
  
 multivitamin tablet Commonly known as:  ONE A DAY Your last dose was: Your next dose is: Take 1 Tab by mouth daily. 1 Tab  
    
   
   
   
  
 NAC PO Your last dose was: Your next dose is: Take 1 Tab by mouth daily. 1 Tab  
    
   
   
   
  
 SAW PALMETTO EXTRACT PO Your last dose was: Your next dose is: Take 1 Tab by mouth daily. With prostate complex 1 Tab SELENIMIN PO Your last dose was: Your next dose is: Take 1 Tab by mouth daily. 1 Tab  
    
   
   
   
  
 trihexyphenidyl 2 mg tablet Commonly known as:  ARTANE Your last dose was: Your next dose is: Take 4 mg by mouth three (3) times daily. 4 mg TURMERIC ROOT EXTRACT PO Your last dose was: Your next dose is: Take  by mouth daily. VITAMIN C Powd Generic drug:  ascorbic acid (vitamin C) Your last dose was: Your next dose is: Take 1 Dose by mouth daily. 1 Dose Where to Get Your Medications Information on where to get these meds will be given to you by the nurse or doctor. ! Ask your nurse or doctor about these medications  
  cephALEXin 250 mg capsule Discharge Instructions Abilio Christensen 
[id number] 
1945 COLON DISCHARGE INSTRUCTIONS Discomfort: 
Redness at IV site- apply warm compress to area; if redness or soreness persist- contact your physician There may be a slight amount of blood passed from the rectum Gaseous discomfort- walking, belching will help relieve any discomfort You may not operate a vehicle for 24 hours You may not engage in an occupation involving machinery or appliances for rest of today You may not drink alcoholic beverages for at least 24 hours Avoid making any critical decisions for at least 24 hour DIET: 
 Regular diet.  however -  remember your colon is empty and a heavy meal will produce gas. Avoid these foods:  vegetables, fried / greasy foods, carbonated drinks for today ACTIVITY: 
You may not  resume your normal daily activities it is recommended that you spend the remainder of the day resting -  avoid any strenuous activity. CALL M.D. ANY SIGN OF: Increasing pain, nausea, vomiting Abdominal distension (swelling) New increased bleeding (oral or rectal) Fever (chills) Pain in chest area Bloody discharge from nose or mouth Shortness of breath You may  take any Advil, Aspirin, Ibuprofen, Motrin, Aleve, or Goodys or Tylenol as needed for pain. Post procedure diagnosis:  Diverticulosis but overall negative exam 
Follow-up Instructions: 
 Call Dr. Sammy Renee if any questions Telephone #  [phone] Additional instructions ; Keflex 2 capsules 4 times each day for one week;  Consider next colonoscopy in 5 years. Valeria Ann 
[id number] 
1945 DISCHARGE SUMMARY from Nurse The following personal items collected during your admission are returned to you:  
Dental Appliance: Dental Appliances: None Vision: Visual Aid: Glasses, At home Hearing Aid:   
Jewelry:   
Clothing:   
Other Valuables:   
Valuables sent to safe:   
 
DO NOT TAKE TYLENOL/ACETAMINOPHEN WITH PERCOCET, [address], [address] TAKE NARCOTIC PAIN MEDICATIONS WITH FOOD If given 2 pain narcotics do NOT take together! Narcotics tend to be constipating, we suggest taking a stool softener such as Colace or Miralax (follow package instructions). DO NOT DRIVE WHILE TAKING NARCOTIC PAIN MEDICATIONS. DO NOT TAKE SLEEPING MEDICATIONS OR ANTIANXIETY MEDICATIONS WHILE TAKING NARCOTIC PAIN MEDICATIONS,  ESPECIALLY THE NIGHT OF ANESTHESIA. CPAP PATIENTS BE SURE TO WEAR MACHINE WHENEVER NAPPING OR SLEEPING. DISCHARGE SUMMARY from Nurse The following personal items collected during your admission are returned to you:  
Dental Appliance: Dental Appliances: None Vision: Visual Aid: Glasses, At home Hearing Aid:   
Jewelry:   
Clothing:   
Other Valuables:   
Valuables sent to safe:   
 
 
PATIENT INSTRUCTIONS: 
 
 
B - Balance E - Eyes F-  Face looks uneven A-  Arms unable to move or move even S-  Speech slurred or non-existent T-  Time-call 911 as soon as signs and symptoms begin-DO NOT go Back to bed or wait to see if you get better-TIME IS BRAIN. If you have not received your influenza and/or pneumococcal vaccine, please follow up with your primary care physician. The discharge information has been reviewed with the patient and caregiver. The patient and caregiver verbalized understanding. Introducing [facility]!    
 [facility] introduces [employer] patient portal. Now you can access parts of your medical record, email your doctor's office, and request medication refills online. 1. In your internet browser, go to [email or web address] 2. Click on the First Time User? Click Here link in the Sign In box. You will see the New Member Sign Up page. 3. Enter your [employer] Access Code exactly as it appears below. You will not need to use this code after youve completed the sign-up process. If you do not sign up before the expiration date, you must request a new code. · [employer] Access Code: [email or web address] Expires: 4/16/2018  9:50 AM 
 
4. Enter the last four digits of your Social Security Number (xxxx) and Date of Birth (mm/dd/yyyy) as indicated and click Submit. You will be taken to the next sign-up page. 5. Create a [employer] ID. This will be your [employer] login ID and cannot be changed, so think of one that is secure and easy to remember. 6. Create a [employer] password. You can change your password at any time. 7. Enter your Password Reset Question and Answer. This can be used at a later time if you forget your password. 8. Enter your e-mail address. You will receive e-mail notification when new information is available in [address] 9. Click Sign Up. You can now view and download portions of your medical record. 10. Click the Download Summary menu link to download a portable copy of your medical information. If you have questions, please visit the Frequently Asked Questions section of the [employer] website. Remember, [employer] is NOT to be used for urgent needs. For medical emergencies, dial 911. Now available from your iPhone and Android! Providers Seen During Your Hospitalization Provider Specialty Primary office phone Scott Pal MD Colon and Rectal Surgery [phone] Your Primary Care Physician (PCP) Primary Care Physician Office Phone Office Fax Nona Clink [phone] You are allergic to the following No active allergies Recent Documentation Height Weight BMI Smoking Status 1.829 m 70.9 kg 21.18 kg/m2 Former Smoker Emergency Contacts Name Discharge Info Relation Home Work Mobile Judith Jacobs DISCHARGE CAREGIVER [3] Spouse [3] [phone] Patient Belongings The following personal items are in your possession at time of discharge: 
  Dental Appliances: None  Visual Aid: Glasses, At home Discharge Instructions Attachments/References CEPHALEXIN (BY MOUTH) (ENGLISH) Patient Handouts Cephalexin (By mouth) Cephalexin (xob-l-RFM-in) Treats infections. This medicine is a cephalosporin antibiotic. Brand Name(s): Chrissy Justin There may be other brand names for this medicine. When This Medicine Should Not Be Used: This medicine is not right for everyone. Do not use this medicine if you had an allergic reaction to cephalexin or another cephalosporin medicine. How to Use This Medicine:  
Capsule, Tablet, Tablet for Suspension, Liquid · Your doctor will tell you how much medicine to use. Do not use more than directed. · Read and follow the patient instructions that come with this medicine. Talk to your doctor or pharmacist if you have any questions. · You may take your medicine with food or milk to avoid stomach upset. · Oral liquid: Shake well just before use. Measure the oral liquid medicine with a marked measuring spoon, oral syringe, or medicine cup. · Take all of the medicine in your prescription to clear up your infection, even if you feel better after the first few doses. · Missed dose: Take a dose as soon as you remember. If it is almost time for your next dose, wait until then and take a regular dose. Do not take extra medicine to make up for a missed dose. · Capsule or tablet: Store at room temperature away from heat, moisture, and direct light. · Oral liquid: Store in the refrigerator for 14 days. After 14 days, throw away any unused medicine. Do not freeze. Drugs and Foods to Avoid: Ask your doctor or pharmacist before using any other medicine, including over-the-counter medicines, vitamins, and herbal products. · Some medicines and foods can affect how cephalexin works. Tell your doctor if you are also using ¨ Metformin ¨ Probenecid Warnings While Using This Medicine: · Tell your doctor if you are pregnant or breastfeeding, or if you have kidney disease, liver disease, or a history of digestive problems, such as colitis. Tell your doctor if you are allergic to penicillin. · This medicine can cause diarrhea. Call your doctor if the diarrhea becomes severe, does not stop, or is bloody. Do not take any medicine to stop diarrhea until you have talked to your doctor. Diarrhea can occur 2 months or more after you stop taking this medicine. · Tell any doctor or dentist who treats you that you are using this medicine. This medicine may affect certain medical test results. · Call your doctor if your symptoms do not improve or if they get worse. · Keep all medicine out of the reach of children. Never share your medicine with anyone. Possible Side Effects While Using This Medicine:  
Call your doctor right away if you notice any of these side effects: · Allergic reaction: Itching or hives, swelling in your face or hands, swelling or tingling in your mouth or throat, chest tightness, trouble breathing · Blistering, peeling, red skin rash · Severe diarrhea, especially if bloody or ongoing · Severe stomach pain, vomiting If you notice these less serious side effects, talk with your doctor: · Mild diarrhea or nausea If you notice other side effects that you think are caused by this medicine, tell your doctor. Call your doctor for medical advice about side effects. You may report side effects to FDA at 4-202-AND-7405 © 2017 [address] Information is for End User's use only and may not be sold, redistributed or otherwise used for commercial purposes. The above information is an  only. It is not intended as medical advice for individual conditions or treatments. Talk to your doctor, nurse or pharmacist before following any medical regimen to see if it is safe and effective for you. Please provide this summary of care documentation to your next provider. Signatures-by signing, you are acknowledging that this After Visit Summary has been reviewed with you and you have received a copy. Patient Signature:  ____________________________________________________________ Date:  ____________________________________________________________  
  
Sierra Mealing Provider Signature:  ____________________________________________________________ Date:  ____________________________________________________________

## 2018-03-26 ENCOUNTER — OFFICE VISIT (OUTPATIENT)
Dept: INTERNAL MEDICINE CLINIC | Age: 73
End: 2018-03-26

## 2018-03-26 VITALS
SYSTOLIC BLOOD PRESSURE: 142 MMHG | HEIGHT: 72 IN | DIASTOLIC BLOOD PRESSURE: 82 MMHG | WEIGHT: 156 LBS | OXYGEN SATURATION: 95 % | HEART RATE: 60 BPM | BODY MASS INDEX: 21.13 KG/M2

## 2018-03-26 DIAGNOSIS — R60.9 DEPENDENT EDEMA: Primary | ICD-10-CM

## 2018-03-26 DIAGNOSIS — G20 PARKINSON'S DISEASE (TREMOR, STIFFNESS, SLOW MOTION, UNSTABLE POSTURE) (HCC): ICD-10-CM

## 2018-03-26 RX ORDER — CARBIDOPA AND LEVODOPA 25; 100 MG/1; MG/1
2 TABLET ORAL 3 TIMES DAILY
COMMUNITY
Start: 2018-03-19 | End: 2018-12-12 | Stop reason: SDUPTHER

## 2018-05-15 NOTE — PROGRESS NOTES
Reviewed record in preparation for visit and have obtained necessary documentation. Identified pt with two pt identifiers(name and ). Chief Complaint   Patient presents with    Ankle swelling     both x a few weeks    Foot Swelling     both x a few weeks        Coordination of Care Questionnaire:  :     1) Have you been to an emergency room, urgent care clinic since your last visit? No    Hospitalized since your last visit? No              2) Have you seen or consulted any other health care providers outside of [address] [place] [address] since your last visit? Yes. He had a colonoscopy done on 18 and he has recently seen his eye doctor, but he's not sure of the exact date.
Subjective:  Mr. Jaclyn Yip comes to the office today for evaluation of swelling in his feet and ankles. This abnormality was noticed by his physical therapist.  He is currently taking boxing therapy and physical therapy for his back. He said that he has started to notice the swelling too. He's still on Artane, but he's also been recently started on Sinemet, half tab t.i.d. He is not having PND or orthopnea or dyspnea. Physical Examination:  GENERAL:  WT: 156. BP: 142/82, subsequently 120/70. P: 60 and regular. O2 sat on room air: 95%. HEENT:  Unremarkable. NECK:  Without jugular venous distention. CHEST:  Lungs clear. CARDIAC:  Heart regular rhythm. There is a 2/6 mitral insufficiency murmur. ABDOMEN:  Soft without hepatosplenomegaly, masses, bruits or ascites. EXTREMITIES:  He has 1+ primarily ankle edema, but also on the dorsum of his foot. Pulses are normal.    Impression:  1. I think this is just likely dependent edema related to his Sinemet and Parkinson's. Plan:  1. I have reassured Mr. Jaclyn Yip about the benign nature of this swelling. I advised support stockings. 2. Follow up as scheduled.
oral

## 2018-11-21 ENCOUNTER — APPOINTMENT (OUTPATIENT)
Dept: CT IMAGING | Age: 73
DRG: 389 | End: 2018-11-21
Attending: NURSE PRACTITIONER
Payer: MEDICARE

## 2018-11-21 ENCOUNTER — HOSPITAL ENCOUNTER (INPATIENT)
Age: 73
LOS: 3 days | Discharge: HOME | DRG: 389 | End: 2018-11-25
Attending: EMERGENCY MEDICINE | Admitting: INTERNAL MEDICINE
Payer: MEDICARE

## 2018-11-21 DIAGNOSIS — Z91.14 NONADHERENCE TO MEDICATION: ICD-10-CM

## 2018-11-21 DIAGNOSIS — I34.1 MVP (MITRAL VALVE PROLAPSE): ICD-10-CM

## 2018-11-21 DIAGNOSIS — K59.00 CONSTIPATION, UNSPECIFIED CONSTIPATION TYPE: ICD-10-CM

## 2018-11-21 DIAGNOSIS — K59.03 DRUG-INDUCED CONSTIPATION: ICD-10-CM

## 2018-11-21 DIAGNOSIS — K56.0 PARALYTIC ILEUS (HCC): Primary | ICD-10-CM

## 2018-11-21 DIAGNOSIS — G20 PARKINSON'S DISEASE (TREMOR, STIFFNESS, SLOW MOTION, UNSTABLE POSTURE) (HCC): ICD-10-CM

## 2018-11-21 DIAGNOSIS — K56.7 ILEUS OF UNSPECIFIED TYPE (HCC): ICD-10-CM

## 2018-11-21 DIAGNOSIS — K56.41 FECAL IMPACTION (HCC): ICD-10-CM

## 2018-11-21 DIAGNOSIS — M80.00XS AGE-RELATED OSTEOPOROSIS WITH CURRENT PATHOLOGICAL FRACTURE, SEQUELA: ICD-10-CM

## 2018-11-21 DIAGNOSIS — R10.84 ABDOMINAL PAIN, GENERALIZED: ICD-10-CM

## 2018-11-21 LAB
ALBUMIN SERPL-MCNC: 4.6 G/DL (ref 3.5–5)
ALBUMIN/GLOB SERPL: 1.3 {RATIO} (ref 1.1–2.2)
ALP SERPL-CCNC: 105 U/L (ref 45–117)
ALT SERPL-CCNC: 16 U/L (ref 12–78)
ANION GAP SERPL CALC-SCNC: 10 MMOL/L (ref 5–15)
AST SERPL-CCNC: 36 U/L (ref 15–37)
ATRIAL RATE: 70 BPM
BASOPHILS # BLD: 0 K/UL (ref 0–0.1)
BASOPHILS NFR BLD: 0 % (ref 0–1)
BILIRUB SERPL-MCNC: 1.2 MG/DL (ref 0.2–1)
BUN SERPL-MCNC: 24 MG/DL (ref 6–20)
BUN/CREAT SERPL: 17 (ref 12–20)
CALCIUM SERPL-MCNC: 9.5 MG/DL (ref 8.5–10.1)
CALCULATED R AXIS, ECG10: -50 DEGREES
CALCULATED T AXIS, ECG11: 112 DEGREES
CHLORIDE SERPL-SCNC: 98 MMOL/L (ref 97–108)
CO2 SERPL-SCNC: 23 MMOL/L (ref 21–32)
CREAT SERPL-MCNC: 1.42 MG/DL (ref 0.7–1.3)
DIAGNOSIS, 93000: NORMAL
DIFFERENTIAL METHOD BLD: ABNORMAL
EOSINOPHIL # BLD: 0 K/UL (ref 0–0.4)
EOSINOPHIL NFR BLD: 0 % (ref 0–7)
ERYTHROCYTE [DISTWIDTH] IN BLOOD BY AUTOMATED COUNT: 13.2 % (ref 11.5–14.5)
GLOBULIN SER CALC-MCNC: 3.6 G/DL (ref 2–4)
GLUCOSE SERPL-MCNC: 178 MG/DL (ref 65–100)
HCT VFR BLD AUTO: 46.1 % (ref 36.6–50.3)
HGB BLD-MCNC: 15.3 G/DL (ref 12.1–17)
IMM GRANULOCYTES # BLD: 0 K/UL (ref 0–0.04)
IMM GRANULOCYTES NFR BLD AUTO: 0 % (ref 0–0.5)
LACTATE SERPL-SCNC: 2.1 MMOL/L (ref 0.4–2)
LACTATE SERPL-SCNC: 2.5 MMOL/L (ref 0.4–2)
LIPASE SERPL-CCNC: 60 U/L (ref 73–393)
LYMPHOCYTES # BLD: 0.7 K/UL (ref 0.8–3.5)
LYMPHOCYTES NFR BLD: 7 % (ref 12–49)
MAGNESIUM SERPL-MCNC: 2 MG/DL (ref 1.6–2.4)
MCH RBC QN AUTO: 31.7 PG (ref 26–34)
MCHC RBC AUTO-ENTMCNC: 33.2 G/DL (ref 30–36.5)
MCV RBC AUTO: 95.6 FL (ref 80–99)
MONOCYTES # BLD: 0.5 K/UL (ref 0–1)
MONOCYTES NFR BLD: 5 % (ref 5–13)
NEUTS SEG # BLD: 8.8 K/UL (ref 1.8–8)
NEUTS SEG NFR BLD: 88 % (ref 32–75)
NRBC # BLD: 0 K/UL (ref 0–0.01)
NRBC BLD-RTO: 0 PER 100 WBC
PLATELET # BLD AUTO: 164 K/UL (ref 150–400)
PMV BLD AUTO: 9.6 FL (ref 8.9–12.9)
POTASSIUM SERPL-SCNC: 4.1 MMOL/L (ref 3.5–5.1)
PROT SERPL-MCNC: 8.2 G/DL (ref 6.4–8.2)
Q-T INTERVAL, ECG07: 412 MS
QRS DURATION, ECG06: 108 MS
QTC CALCULATION (BEZET), ECG08: 457 MS
RBC # BLD AUTO: 4.82 M/UL (ref 4.1–5.7)
RBC MORPH BLD: ABNORMAL
SODIUM SERPL-SCNC: 131 MMOL/L (ref 136–145)
TROPONIN I SERPL-MCNC: <0.05 NG/ML
VENTRICULAR RATE, ECG03: 74 BPM
WBC # BLD AUTO: 10 K/UL (ref 4.1–11.1)

## 2018-11-21 PROCEDURE — 77030008771 HC TU NG SALEM SUMP -A

## 2018-11-21 PROCEDURE — 84484 ASSAY OF TROPONIN QUANT: CPT

## 2018-11-21 PROCEDURE — 83735 ASSAY OF MAGNESIUM: CPT

## 2018-11-21 PROCEDURE — 99285 EMERGENCY DEPT VISIT HI MDM: CPT

## 2018-11-21 PROCEDURE — 83605 ASSAY OF LACTIC ACID: CPT

## 2018-11-21 PROCEDURE — 85025 COMPLETE CBC W/AUTO DIFF WBC: CPT

## 2018-11-21 PROCEDURE — 74011636320 HC RX REV CODE- 636/320: Performed by: EMERGENCY MEDICINE

## 2018-11-21 PROCEDURE — 80053 COMPREHEN METABOLIC PANEL: CPT

## 2018-11-21 PROCEDURE — 74011000250 HC RX REV CODE- 250: Performed by: NURSE PRACTITIONER

## 2018-11-21 PROCEDURE — 96361 HYDRATE IV INFUSION ADD-ON: CPT

## 2018-11-21 PROCEDURE — 77030019563 HC DEV ATTCH FEED HOLL -A

## 2018-11-21 PROCEDURE — 99218 HC RM OBSERVATION: CPT

## 2018-11-21 PROCEDURE — 74011250636 HC RX REV CODE- 250/636: Performed by: INTERNAL MEDICINE

## 2018-11-21 PROCEDURE — 81001 URINALYSIS AUTO W/SCOPE: CPT

## 2018-11-21 PROCEDURE — 74011250636 HC RX REV CODE- 250/636: Performed by: EMERGENCY MEDICINE

## 2018-11-21 PROCEDURE — 36415 COLL VENOUS BLD VENIPUNCTURE: CPT

## 2018-11-21 PROCEDURE — 83690 ASSAY OF LIPASE: CPT

## 2018-11-21 PROCEDURE — 96374 THER/PROPH/DIAG INJ IV PUSH: CPT

## 2018-11-21 PROCEDURE — 74177 CT ABD & PELVIS W/CONTRAST: CPT

## 2018-11-21 PROCEDURE — 93005 ELECTROCARDIOGRAM TRACING: CPT

## 2018-11-21 PROCEDURE — 74011250636 HC RX REV CODE- 250/636: Performed by: NURSE PRACTITIONER

## 2018-11-21 RX ORDER — SODIUM CHLORIDE 9 MG/ML
50 INJECTION, SOLUTION INTRAVENOUS
Status: COMPLETED | OUTPATIENT
Start: 2018-11-21 | End: 2018-11-21

## 2018-11-21 RX ORDER — AMANTADINE HYDROCHLORIDE 100 MG/1
100 CAPSULE, GELATIN COATED ORAL 3 TIMES DAILY
Status: DISCONTINUED | OUTPATIENT
Start: 2018-11-21 | End: 2018-11-25 | Stop reason: HOSPADM

## 2018-11-21 RX ORDER — LIDOCAINE HYDROCHLORIDE 20 MG/ML
JELLY TOPICAL
Status: COMPLETED | OUTPATIENT
Start: 2018-11-21 | End: 2018-11-21

## 2018-11-21 RX ORDER — BENZTROPINE MESYLATE 1 MG/1
1 TABLET ORAL 3 TIMES DAILY
Status: DISCONTINUED | OUTPATIENT
Start: 2018-11-21 | End: 2018-11-25 | Stop reason: HOSPADM

## 2018-11-21 RX ORDER — BENZTROPINE MESYLATE 2 MG/1
2 TABLET ORAL 3 TIMES DAILY
COMMUNITY
End: 2019-01-02

## 2018-11-21 RX ORDER — CARBIDOPA AND LEVODOPA 25; 100 MG/1; MG/1
2 TABLET ORAL 3 TIMES DAILY
Status: DISCONTINUED | OUTPATIENT
Start: 2018-11-21 | End: 2018-11-25 | Stop reason: HOSPADM

## 2018-11-21 RX ORDER — SODIUM CHLORIDE 0.9 % (FLUSH) 0.9 %
10 SYRINGE (ML) INJECTION
Status: COMPLETED | OUTPATIENT
Start: 2018-11-21 | End: 2018-11-21

## 2018-11-21 RX ORDER — HEPARIN SODIUM 5000 [USP'U]/ML
5000 INJECTION, SOLUTION INTRAVENOUS; SUBCUTANEOUS EVERY 12 HOURS
Status: DISCONTINUED | OUTPATIENT
Start: 2018-11-21 | End: 2018-11-22

## 2018-11-21 RX ORDER — SODIUM CHLORIDE 0.9 % (FLUSH) 0.9 %
5-10 SYRINGE (ML) INJECTION AS NEEDED
Status: DISCONTINUED | OUTPATIENT
Start: 2018-11-21 | End: 2018-11-25 | Stop reason: HOSPADM

## 2018-11-21 RX ORDER — MORPHINE SULFATE 2 MG/ML
2 INJECTION, SOLUTION INTRAMUSCULAR; INTRAVENOUS
Status: COMPLETED | OUTPATIENT
Start: 2018-11-21 | End: 2018-11-21

## 2018-11-21 RX ORDER — ONDANSETRON 2 MG/ML
4 INJECTION INTRAMUSCULAR; INTRAVENOUS
Status: DISCONTINUED | OUTPATIENT
Start: 2018-11-21 | End: 2018-11-25 | Stop reason: HOSPADM

## 2018-11-21 RX ORDER — ASPIRIN 325 MG
325 TABLET ORAL DAILY
Status: DISCONTINUED | OUTPATIENT
Start: 2018-11-22 | End: 2018-11-25 | Stop reason: HOSPADM

## 2018-11-21 RX ORDER — SODIUM CHLORIDE 9 MG/ML
125 INJECTION, SOLUTION INTRAVENOUS CONTINUOUS
Status: DISPENSED | OUTPATIENT
Start: 2018-11-21 | End: 2018-11-23

## 2018-11-21 RX ORDER — ACETAMINOPHEN 325 MG/1
650 TABLET ORAL
Status: DISCONTINUED | OUTPATIENT
Start: 2018-11-21 | End: 2018-11-25 | Stop reason: HOSPADM

## 2018-11-21 RX ORDER — SODIUM CHLORIDE 0.9 % (FLUSH) 0.9 %
5-10 SYRINGE (ML) INJECTION EVERY 8 HOURS
Status: DISCONTINUED | OUTPATIENT
Start: 2018-11-21 | End: 2018-11-25 | Stop reason: HOSPADM

## 2018-11-21 RX ORDER — AMANTADINE HYDROCHLORIDE 100 MG/1
100 CAPSULE, GELATIN COATED ORAL 3 TIMES DAILY
COMMUNITY
End: 2018-12-12 | Stop reason: SDUPTHER

## 2018-11-21 RX ADMIN — SODIUM CHLORIDE 50 ML/HR: 900 INJECTION, SOLUTION INTRAVENOUS at 15:29

## 2018-11-21 RX ADMIN — SODIUM CHLORIDE 1000 ML: 900 INJECTION, SOLUTION INTRAVENOUS at 14:50

## 2018-11-21 RX ADMIN — MORPHINE SULFATE 2 MG: 2 INJECTION, SOLUTION INTRAMUSCULAR; INTRAVENOUS at 14:50

## 2018-11-21 RX ADMIN — HEPARIN SODIUM 5000 UNITS: 5000 INJECTION INTRAVENOUS; SUBCUTANEOUS at 20:40

## 2018-11-21 RX ADMIN — LIDOCAINE HYDROCHLORIDE: 20 JELLY TOPICAL at 16:10

## 2018-11-21 RX ADMIN — Medication 10 ML: at 15:29

## 2018-11-21 RX ADMIN — IOPAMIDOL 100 ML: 755 INJECTION, SOLUTION INTRAVENOUS at 15:29

## 2018-11-21 NOTE — ED NOTES
Pt had a very large soft dark brown BM with a few small pellets, pt then said \"i dont think I need the surgery\". When asked if he meant the NG tube he said yes and he did not want it. Hospitalist at bedside and is aware of pt refusing NG tube.

## 2018-11-21 NOTE — ED NOTES
Assumed care of pt from triage. Wife at bedside. Pt reports a few days ago he has switched his parkinson's' medication trihexipine and switched to benztropine, pt also takes amantadine, and carb/levo TID. 
1100 today to have pain in center of chest and back and also lower abdomen. Last BM was couple days ago \" I have been constipating a lot here recently\" Last night and today pt did not eat very well. C/O moving slow and wife reports it has been horrible since off of trihexipine and has been up a lot during the night and confused.

## 2018-11-21 NOTE — ED NOTES
Case d/w MD. 
Will do clear liquids since pt having BM's and not vomiting or nauseated. Aware if he starts to vomit will place NPO and need NG tube.

## 2018-11-21 NOTE — ED PROVIDER NOTES
EMERGENCY DEPARTMENT HISTORY AND PHYSICAL EXAM 
 
 
Date: 11/21/2018 Patient Name: Melita Hooper History of Presenting Illness Chief Complaint Patient presents with  Abdominal Pain Onset of pain to epigastric area around 1100 today. Pain has now moved to LLQ. History Provided By: Patient and Patient's Wife HPI: Melita [place], 68 y.o. male with PMHx significant for PD presents ambulatory from home to the ED with cc of difficulty with gait and coordinated movements that has been progressive since his PD medications were changed. He states that he developed epigastric pain today around 1100. Pain radiates through to his back. Associated symptoms include constipation. He also endorses chronic low back pain related to compression fractures. He hasn't been able to box or attend yoga because he doesn't feel well since the changes of his medications. Pt denies fevers, chills, night sweats, chest pain, pressure, SOB, LOPEZ, PND, orthopnea, n/v/d, melena, hematuria, dysuria, constipation, HA, dizziness, and syncope. PCP: Rush Sanders MD 
 
Current Facility-Administered Medications Medication Dose Route Frequency Provider Last Rate Last Dose  sodium chloride (NS) flush 5-10 mL  5-10 mL IntraVENous Q8H Willis Vega., MD      
 sodium chloride (NS) flush 5-10 mL  5-10 mL IntraVENous PRN Willis Vega., MD      
 0.9% sodium chloride infusion  100 mL/hr IntraVENous CONTINUOUS Willis Vega., MD      
 acetaminophen (TYLENOL) tablet 650 mg  650 mg Oral Q4H PRN Willis Haley MD      
 heparin (porcine) injection 5,000 Units  5,000 Units SubCUTAneous Q12H Willis Haley MD      
 amantadine HCl (SYMMETREL) capsule 100 mg  100 mg Oral TID Willis Vega., MD      
 [START ON 11/22/2018] aspirin tablet 325 mg  325 mg Oral DAILY Willis Vega., MD      
 benztropine (COGENTIN) tablet 1 mg  1 mg Oral TID Willis Haley MD      
  carbidopa-levodopa (SINEMET)  mg per tablet 2 Tab  2 Tab Oral TID Carmen Arroyo MD      
 ondansetron [facility]) injection 4 mg  4 mg IntraVENous Q4H PRN Carmen Arroyo MD      
 
Current Outpatient Medications Medication Sig Dispense Refill  amantadine HCl (SYMMETREL) 100 mg capsule Take 100 mg by mouth three (3) times daily.  benztropine (COGENTIN) 2 mg tablet Take 2 mg by mouth three (3) times daily.  carbidopa-levodopa (SINEMET)  mg per tablet  aspirin (ASPIRIN) 325 mg tablet Take 325 mg by mouth daily.  LYSINE PO Take  by mouth daily.  ALPHA LIPOIC ACID PO Take  by mouth daily.  TURMERIC ROOT EXTRACT PO Take  by mouth daily.  TURMERIC (CURCUMIN) by Does Not Apply route daily.  GLUTATHIONE 600 mg by Nasal route daily.  multivitamin (ONE A DAY) tablet Take 1 Tab by mouth daily.  ascorbic acid, vitamin C, (VITAMIN C) powd Take 1 Dose by mouth daily.  GINSENG PO Take 1 Dose by mouth daily.  METHYLSULFONYLMETHANE (MSM PO) Take 1 Tab by mouth daily.  MILK THISTLE, BULK, Take 1 Tab by mouth daily.  ACETYLCYSTEINE (NAC PO) Take 1 Tab by mouth daily.  IDEBENONE, BULK, Take 1 Tab by mouth daily.  trihexyphenidyl (ARTANE) 2 mg tablet Take 4 mg by mouth three (3) times daily.  SELENIUM (SELENIMIN PO) Take 1 Tab by mouth daily.  Pjsqqauf-Lfbw-Aexjxa-Hyalur Ac [id number] mg cap Take 1 Tab by mouth daily.  SAW PALMETTO XTR/ZINC PICOLIN (SAW PALMETTO EXTRACT PO) Take 1 Tab by mouth daily. With prostate complex  LUTEIN EXTRACT/ZEAXANTHIN EXT (LUTEIN-ZEAXANTHIN PO) Take 1 Tab by mouth daily.  cyanocobalamin 1,000 mcg tablet Take 1,000 mcg by mouth daily.  cholecalciferol, VITAMIN D3, (VITAMIN D3) 5,000 unit tab tablet Take 5,000 Units by mouth two (2) times a week.  DOCOSAHEXANOIC ACID/EPA (FISH OIL PO) Take 1 Tab by mouth daily. Past History Past Medical History: Past Medical History:  
Diagnosis Date  Mitral valve disorders(424.0) 11/19/2009 4/3/17 followed by PCP no cardiologist  
 Parkinson disease ([facility] [address]  Prostatism 11/19/2009  
 Skin cancer Past Surgical History: 
Past Surgical History:  
Procedure Laterality Date  HX APPENDECTOMY  childhood  HX COLONOSCOPY Family History: No family history on file. Social History: 
Social History Tobacco Use  Smoking status: Former Smoker  Smokeless tobacco: Never Used Substance Use Topics  Alcohol use: No  
 Drug use: No  
 
 
Allergies: 
No Known Allergies Review of Systems Review of Systems Constitutional: Positive for activity change and appetite change. Negative for chills, diaphoresis, fatigue, fever and unexpected weight change. HENT: Negative for congestion, ear pain, rhinorrhea, sinus pressure, sore throat, tinnitus, trouble swallowing and voice change. Eyes: Negative for pain, discharge, redness and visual disturbance. Respiratory: Negative for apnea, cough, choking, chest tightness, shortness of breath, wheezing and stridor. Cardiovascular: Negative for chest pain, palpitations and leg swelling. Gastrointestinal: Positive for abdominal pain. Negative for constipation, nausea and vomiting. Endocrine: Negative for cold intolerance and heat intolerance. Genitourinary: Negative for difficulty urinating, dysuria, flank pain, hematuria, testicular pain and urgency. Musculoskeletal: Positive for back pain and gait problem. Negative for arthralgias, joint swelling, myalgias, neck pain and neck stiffness. Skin: Negative for color change, pallor, rash and wound. Allergic/Immunologic: Negative for immunocompromised state. Neurological: Positive for tremors and weakness. Negative for dizziness, syncope, light-headedness, numbness and headaches. Hematological: Does not bruise/bleed easily. Psychiatric/Behavioral: Negative for agitation, confusion and suicidal ideas. Physical Exam  
Physical Exam  
Constitutional: He is oriented to person, place, and time. He appears well-developed and well-nourished. No distress. HENT:  
Head: Atraumatic. Nose: Nose normal.  
Mouth/Throat: No oropharyngeal exudate. Eyes: Conjunctivae and EOM are normal. Right eye exhibits no discharge. Left eye exhibits no discharge. No scleral icterus. Neck: Normal range of motion. Neck supple. No JVD present. No tracheal deviation present. No thyromegaly present. Cardiovascular: Normal rate and regular rhythm. Exam reveals no gallop and no friction rub. No murmur heard. Pulmonary/Chest: Breath sounds normal. No stridor. No respiratory distress. He has no wheezes. He has no rales. He exhibits no tenderness. Abdominal: Soft. Bowel sounds are normal. He exhibits no distension and no mass. There is tenderness in the epigastric area, periumbilical area and suprapubic area. There is no rigidity, no rebound, no guarding, no tenderness at McBurney's point and negative Juarez's sign. Genitourinary: Rectum normal. Rectal exam shows no external hemorrhoid, no internal hemorrhoid, no fissure, no mass, no tenderness, anal tone normal and guaiac negative stool. Genitourinary Comments: Hard stool palpated Musculoskeletal: Normal range of motion. He exhibits no edema or tenderness. Lymphadenopathy:  
  He has no cervical adenopathy. Neurological: He is alert and oriented to person, place, and time. He displays tremor. No cranial nerve deficit or sensory deficit. Coordination normal. GCS eye subscore is 4. GCS verbal subscore is 5. GCS motor subscore is 6. Skin: Skin is warm and dry. He is not diaphoretic. Psychiatric: He has a normal mood and affect. His behavior is normal.  
Nursing note and vitals reviewed. Diagnostic Study Results Labs - Recent Results (from the past 12 hour(s)) EKG, 12 LEAD, INITIAL Collection Time: 11/21/18 12:48 PM  
Result Value Ref Range Ventricular Rate 74 BPM  
 Atrial Rate 70 BPM  
 QRS Duration 108 ms Q-T Interval 412 ms QTC Calculation (Bezet) 457 ms Calculated R Axis -50 degrees Calculated T Axis 112 degrees Diagnosis Baseline artifact Sinus rhythm Left ventricular hypertrophy with QRS widening No previous ECGs available Confirmed by Oma Calles ([id number]) on 11/21/2018 3:89:32 PM 
  
METABOLIC PANEL, COMPREHENSIVE Collection Time: 11/21/18  1:39 PM  
Result Value Ref Range Sodium 131 (L) 136 - 145 mmol/L Potassium 4.1 3.5 - 5.1 mmol/L Chloride 98 97 - 108 mmol/L  
 CO2 23 21 - 32 mmol/L Anion gap 10 5 - 15 mmol/L Glucose 178 (H) 65 - 100 mg/dL BUN 24 (H) 6 - 20 MG/DL Creatinine 1.42 (H) 0.70 - 1.30 MG/DL  
 BUN/Creatinine ratio 17 12 - 20 GFR est AA 59 (L) >60 ml/min/1.73m2 GFR est non-AA 49 (L) >60 ml/min/1.73m2 Calcium 9.5 8.5 - 10.1 MG/DL Bilirubin, total 1.2 (H) 0.2 - 1.0 MG/DL  
 ALT (SGPT) 16 12 - 78 U/L  
 AST (SGOT) 36 15 - 37 U/L Alk. phosphatase 105 45 - 117 U/L Protein, total 8.2 6.4 - 8.2 g/dL Albumin 4.6 3.5 - 5.0 g/dL Globulin 3.6 2.0 - 4.0 g/dL A-G Ratio 1.3 1.1 - 2.2    
CBC WITH AUTOMATED DIFF Collection Time: 11/21/18  1:39 PM  
Result Value Ref Range WBC 10.0 4.1 - 11.1 K/uL  
 RBC 4.82 4.10 - 5.70 M/uL  
 HGB 15.3 12.1 - 17.0 g/dL HCT 46.1 36.6 - 50.3 % MCV 95.6 80.0 - 99.0 FL  
 MCH 31.7 26.0 - 34.0 PG  
 MCHC 33.2 30.0 - 36.5 g/dL  
 RDW 13.2 11.5 - 14.5 % PLATELET 668 219 - 510 K/uL MPV 9.6 8.9 - 12.9 FL  
 NRBC 0.0 0  WBC ABSOLUTE NRBC 0.00 0.00 - 0.01 K/uL NEUTROPHILS 88 (H) 32 - 75 % LYMPHOCYTES 7 (L) 12 - 49 % MONOCYTES 5 5 - 13 % EOSINOPHILS 0 0 - 7 % BASOPHILS 0 0 - 1 % IMMATURE GRANULOCYTES 0 0.0 - 0.5 % ABS. NEUTROPHILS 8.8 (H) 1.8 - 8.0 K/UL  
 ABS. LYMPHOCYTES 0.7 (L) 0.8 - 3.5 K/UL ABS. MONOCYTES 0.5 0.0 - 1.0 K/UL  
 ABS. EOSINOPHILS 0.0 0.0 - 0.4 K/UL  
 ABS. BASOPHILS 0.0 0.0 - 0.1 K/UL  
 ABS. IMM. GRANS. 0.0 0.00 - 0.04 K/UL  
 DF AUTOMATED    
 RBC COMMENTS TREVOR CELLS 
PRESENT 
    
LIPASE Collection Time: 11/21/18  1:39 PM  
Result Value Ref Range Lipase 60 (L) 73 - 393 U/L  
LACTIC ACID Collection Time: 11/21/18  1:39 PM  
Result Value Ref Range Lactic acid 2.1 (HH) 0.4 - 2.0 MMOL/L  
TROPONIN I Collection Time: 11/21/18  1:39 PM  
Result Value Ref Range Troponin-I, Qt. <0.05 <0.05 ng/mL LACTIC ACID Collection Time: 11/21/18  5:52 PM  
Result Value Ref Range Lactic acid 2.5 (HH) 0.4 - 2.0 MMOL/L Radiologic Studies -  
CT ABD PELV W CONT Final Result CT Results  (Last 48 hours)  
          
 11/21/18 1532  CT ABD PELV W CONT Final result Impression:  IMPRESSION:   
1. Small bowel obstruction possibly secondary to marked fecal impaction of the  
colon. 2. Large amount of stool throughout the colon and rectum. 3. Dilated fluid-filled esophagus and dilated stomach. 4. Hepatic cysts. 5. Right renal cyst.  
6. Spondylosis with L2, L3 and L4 compression fractures. Narrative:  EXAM: CT ABDOMEN PELVIS WITH CONTRAST INDICATION: Abdominal pain with radiation to the back. COMPARISON: None. CONTRAST: 100 mL of Isovue-370. TECHNIQUE:   
Multislice helical CT was performed from the diaphragm to the symphysis pubis  
during uneventful rapid bolus intravenous contrast administration. Oral contrast  
was not administered. Contiguous 5 mm axial images were reconstructed and lung  
and soft tissue windows were generated. Coronal and sagittal reformations were  
generated. CT dose reduction was achieved through use of a standardized protocol  
tailored for this examination and automatic exposure control for dose  
modulation. FINDINGS:  
LOWER CHEST: The visualized portions of the lung bases are clear. The esophagus is dilated and fluid-filled. ABDOMEN:  
Liver: The liver is normal in size and contour with no focal abnormality. There  
is a 7.1 x 5.1 cm cyst in the right lobe of the liver. Gallbladder and bile ducts: There is no calcified gallstone or biliary  
dilatation. Spleen: No abnormality. Pancreas: No abnormality. Adrenal glands: No abnormality. Kidneys: There is a 4 x 3.6 cm cyst in the right kidney. PELVIS:  
Reproductive organs: The prostate gland and seminal vesicles are symmetrical.   
Bladder: No abnormality. BOWEL AND MESENTERY: The stomach and small bowel are markedly dilated and  
fluid-filled with air-fluid levels. There is no transition to nondilated small  
bowel. There is a massive amount of stool throughout the colon. There is no  
mesenteric mass or adenopathy. The appendix not identified. PERITONEUM: There is no ascites or free intraperitoneal air. RETROPERITONEUM: The aorta  tapers without aneurysm. There is no retroperitoneal  
adenopathy or mass. There is no pelvic mass or adenopathy. BONES AND SOFT TISSUES: There are degenerative changes of the spine with  
compression fractures of L2, L3 and L4. CXR Results  (Last 48 hours) None Medical Decision Making I am the first provider for this patient. I reviewed the vital signs, available nursing notes, past medical history, past surgical history, family history and social history. Vital Signs-Reviewed the patient's vital signs. Patient Vitals for the past 12 hrs: 
 Temp Pulse Resp BP SpO2  
11/21/18 1744  95 29  95 % 11/21/18 1700  93 (!) 34 (!) 126/96 95 % 11/21/18 1630  87 27 151/85 93 % 11/21/18 1600  89 (!) 34 140/81   
11/21/18 1544    156/80 96 % 11/21/18 1500  85 (!) 35 128/80   
11/21/18 1444  81 (!) 35  95 % 11/21/18 1430  71 26 142/80   
11/21/18 1425  78 (!) 32 122/74   
11/21/18 1416 97.9 °F (36.6 °C)      
11/21/18 1256  76 16 (!) 135/99 100 % Pulse Oximetry Analysis - 100% on RA Cardiac Monitor:  
Rate: 76 bpm 
Rhythm: Normal Sinus Rhythm EKG interpretation: (Preliminary) Rhythm: normal sinus rhythm; and regular . Rate (approx.): 74; Axis: normal; IN interval: normal; QRS interval: prolonged; ST/T wave: non-specific changes; Other findings: abnormal ekg - resting tremor due to PD Records Reviewed: Nursing Notes and Old Medical Records Provider Notes (Medical Decision Making): Abdominal pain Malaise most likely secondary to new PD meds Routine laboratory data and UA 
IVF 
CT abd/pelvis Consult to Gen Surg Consult to Colorectal 
Consult to Hospitalist 
 
CONSULT NOTE:  
[place], NP spoke with Dr. Deanne Walker MD, Specialty: Gen Surg Discussed pt's hx, disposition, and available diagnostic and imaging results. Reviewed care plans. Consultant agrees with plans as outlined. No surgical intervention currently warranted. NGT for decompression and manage medically. Bienvenido Seymour NP 
 
CONSULT NOTE:  
[place], NP spoke with Dr. Yonathan Kauffman MD, Specialty: Colorectal 
Discussed pt's hx, disposition, and available diagnostic and imaging results. Reviewed care plans. Consultant agrees with plans as outlined. NGT, serial enemas, and admit to hospitalist.  Bienvenido Seymour NP 
 
CONSULT NOTE:  
[place], NP spoke with Dr. Duran Lr MD, Specialty: Hospitalist 
Discussed pt's hx, disposition, and available diagnostic and imaging results. Reviewed care plans. Consultant agrees with plans as outlined. Admit to inpatient. Bienvenido Seymour NP 
 
 
ED Course:  
Initial assessment performed. The patients presenting problems have been discussed, and they are in agreement with the care plan formulated and outlined with them. I have encouraged them to ask questions as they arise throughout their visit. Stable, ambulatory pt in [facility] Critical Care Time:  
0 Disposition: 
Admit to inpt 
 
7:50 PM 
Patient is being admitted to the hospital.  The results of their tests and reasons for their admission have been discussed with them and/or available family. They convey agreement and understanding for the need to be admitted and for their admission diagnosis. Consultation has been made with the inpatient physician specialist for hospitalization. LABORATORY TESTS: 
Recent Results (from the past 12 hour(s)) EKG, 12 LEAD, INITIAL Collection Time: 11/21/18 12:48 PM  
Result Value Ref Range Ventricular Rate 74 BPM  
 Atrial Rate 70 BPM  
 QRS Duration 108 ms Q-T Interval 412 ms QTC Calculation (Bezet) 457 ms Calculated R Axis -50 degrees Calculated T Axis 112 degrees Diagnosis Baseline artifact Sinus rhythm Left ventricular hypertrophy with QRS widening No previous ECGs available Confirmed by Ronny Farris ([id number]) on 11/21/2018 5:43:93 PM 
  
METABOLIC PANEL, COMPREHENSIVE Collection Time: 11/21/18  1:39 PM  
Result Value Ref Range Sodium 131 (L) 136 - 145 mmol/L Potassium 4.1 3.5 - 5.1 mmol/L Chloride 98 97 - 108 mmol/L  
 CO2 23 21 - 32 mmol/L Anion gap 10 5 - 15 mmol/L Glucose 178 (H) 65 - 100 mg/dL BUN 24 (H) 6 - 20 MG/DL Creatinine 1.42 (H) 0.70 - 1.30 MG/DL  
 BUN/Creatinine ratio 17 12 - 20 GFR est AA 59 (L) >60 ml/min/1.73m2 GFR est non-AA 49 (L) >60 ml/min/1.73m2 Calcium 9.5 8.5 - 10.1 MG/DL Bilirubin, total 1.2 (H) 0.2 - 1.0 MG/DL  
 ALT (SGPT) 16 12 - 78 U/L  
 AST (SGOT) 36 15 - 37 U/L Alk. phosphatase 105 45 - 117 U/L Protein, total 8.2 6.4 - 8.2 g/dL Albumin 4.6 3.5 - 5.0 g/dL Globulin 3.6 2.0 - 4.0 g/dL A-G Ratio 1.3 1.1 - 2.2    
CBC WITH AUTOMATED DIFF Collection Time: 11/21/18  1:39 PM  
Result Value Ref Range WBC 10.0 4.1 - 11.1 K/uL  
 RBC 4.82 4.10 - 5.70 M/uL  
 HGB 15.3 12.1 - 17.0 g/dL HCT 46.1 36.6 - 50.3 %  MCV 95.6 80.0 - 99.0 FL  
 MCH 31.7 26.0 - 34.0 PG  
 MCHC 33.2 30.0 - 36.5 g/dL  
 RDW 13.2 11.5 - 14.5 % PLATELET 609 401 - 392 K/uL MPV 9.6 8.9 - 12.9 FL  
 NRBC 0.0 0  WBC ABSOLUTE NRBC 0.00 0.00 - 0.01 K/uL NEUTROPHILS 88 (H) 32 - 75 % LYMPHOCYTES 7 (L) 12 - 49 % MONOCYTES 5 5 - 13 % EOSINOPHILS 0 0 - 7 % BASOPHILS 0 0 - 1 % IMMATURE GRANULOCYTES 0 0.0 - 0.5 % ABS. NEUTROPHILS 8.8 (H) 1.8 - 8.0 K/UL  
 ABS. LYMPHOCYTES 0.7 (L) 0.8 - 3.5 K/UL  
 ABS. MONOCYTES 0.5 0.0 - 1.0 K/UL  
 ABS. EOSINOPHILS 0.0 0.0 - 0.4 K/UL  
 ABS. BASOPHILS 0.0 0.0 - 0.1 K/UL  
 ABS. IMM. GRANS. 0.0 0.00 - 0.04 K/UL  
 DF AUTOMATED    
 RBC COMMENTS TREVOR CELLS 
PRESENT 
    
LIPASE Collection Time: 11/21/18  1:39 PM  
Result Value Ref Range Lipase 60 (L) 73 - 393 U/L  
LACTIC ACID Collection Time: 11/21/18  1:39 PM  
Result Value Ref Range Lactic acid 2.1 (HH) 0.4 - 2.0 MMOL/L  
TROPONIN I Collection Time: 11/21/18  1:39 PM  
Result Value Ref Range Troponin-I, Qt. <0.05 <0.05 ng/mL LACTIC ACID Collection Time: 11/21/18  5:52 PM  
Result Value Ref Range Lactic acid 2.5 (HH) 0.4 - 2.0 MMOL/L IMAGING RESULTS: 
CT ABD PELV W CONT Final Result Ct Abd Pelv W Cont Result Date: 11/21/2018 EXAM: CT ABDOMEN PELVIS WITH CONTRAST INDICATION: Abdominal pain with radiation to the back. COMPARISON: None. CONTRAST: 100 mL of Isovue-370. TECHNIQUE: Multislice helical CT was performed from the diaphragm to the symphysis pubis during uneventful rapid bolus intravenous contrast administration. Oral contrast was not administered. Contiguous 5 mm axial images were reconstructed and lung and soft tissue windows were generated. Coronal and sagittal reformations were generated. CT dose reduction was achieved through use of a standardized protocol tailored for this examination and automatic exposure control for dose modulation.  FINDINGS: LOWER CHEST: The visualized portions of the lung bases are clear. The esophagus is dilated and fluid-filled. ABDOMEN: Liver: The liver is normal in size and contour with no focal abnormality. There is a 7.1 x 5.1 cm cyst in the right lobe of the liver. Gallbladder and bile ducts: There is no calcified gallstone or biliary dilatation. Spleen: No abnormality. Pancreas: No abnormality. Adrenal glands: No abnormality. Kidneys: There is a 4 x 3.6 cm cyst in the right kidney. PELVIS: Reproductive organs: The prostate gland and seminal vesicles are symmetrical. Bladder: No abnormality. BOWEL AND MESENTERY: The stomach and small bowel are markedly dilated and fluid-filled with air-fluid levels. There is no transition to nondilated small bowel. There is a massive amount of stool throughout the colon. There is no mesenteric mass or adenopathy. The appendix not identified. PERITONEUM: There is no ascites or free intraperitoneal air. RETROPERITONEUM: The aorta  tapers without aneurysm. There is no retroperitoneal adenopathy or mass. There is no pelvic mass or adenopathy. BONES AND SOFT TISSUES: There are degenerative changes of the spine with compression fractures of L2, L3 and L4. IMPRESSION: 1. Small bowel obstruction possibly secondary to marked fecal impaction of the colon. 2. Large amount of stool throughout the colon and rectum. 3. Dilated fluid-filled esophagus and dilated stomach. 4. Hepatic cysts. 5. Right renal cyst. 6. Spondylosis with L2, L3 and L4 compression fractures. MEDICATIONS GIVEN: 
Medications  
sodium chloride (NS) flush 5-10 mL (not administered)  
sodium chloride (NS) flush 5-10 mL (not administered) 0.9% sodium chloride infusion (not administered)  
acetaminophen (TYLENOL) tablet 650 mg (not administered)  
heparin (porcine) injection 5,000 Units (not administered)  
amantadine HCl (SYMMETREL) capsule 100 mg (not administered) aspirin tablet 325 mg (not administered) benztropine (COGENTIN) tablet 1 mg (not administered)  
carbidopa-levodopa (SINEMET)  mg per tablet 2 Tab (not administered)  
ondansetron (ZOFRAN) injection 4 mg (not administered) morphine injection 2 mg (2 mg IntraVENous Given 11/21/18 1450)  
sodium chloride 0.9 % bolus infusion 1,000 mL (0 mL IntraVENous IV Completed 11/21/18 1550)  
0.9% sodium chloride infusion (0 mL/hr IntraVENous IV Completed 11/21/18 1600)  
sodium chloride (NS) flush 10 mL (10 mL IntraVENous Given 11/21/18 1529) iopamidol (ISOVUE-370) 76 % injection 100 mL (100 mL IntraVENous Given 11/21/18 1529)  
lidocaine (XYLOCAINE) 2 % jelly ( Topical Given 11/21/18 1610) IMPRESSION: 
1. Paralytic ileus ([address] 2. Fecal impaction ([address] 3. Abdominal pain, generalized PLAN: 
1. Admit to Hospitalist  
 
 
Diagnosis Clinical Impression: 1. Paralytic ileus ([address] 2. Fecal impaction ([address] 3. Abdominal pain, generalized Attestations: 
 
Hamzah Del Real NP 
7:50 PM

## 2018-11-21 NOTE — ED NOTES
Pt complaining of being uncomfortable in the bed and complaining of being thirsty and hungry and multiple education has been needed to be given regarding NPO status and SBO. Wife would like to speak to hospitalist again.  
Will notify hospitalist.

## 2018-11-21 NOTE — H&P
Hospitalist Admission NoteNAME: Wolf Hoffmann :  1945 MRN:  [id number] Date/Time:  2018 6:13 PM 
 
Patient PCP: Ryan Florian MD 
______________________________________________________________________ Given the patient's current clinical presentation, I have a high level of concern for decompensation if discharged from the emergency department. Complex decision making was performed, which includes reviewing the patient's available past medical records, laboratory results, and x-ray films. My assessment of this patient's clinical condition and my plan of care is as follows. Assessment / Plan: 
Parkinson's disease, diagnosed in . Mitral valve prolapse, diagnosed by echocardiography Compression fracture of L2 and L4 Constipation Hyponatremia Elevated la likely dehydration Need ua Wbc 10 Trend la MAINOR recheck labs in am if worsen Get us and renal consult IVF Admit observation after d/w Colorectal surgery Manual deimpaction Per CR use only fleets enema IVF Cont with PD meds Consult neurology to assist with meds Ct scan Kylee Escobar Small bowel obstruction possibly secondary to marked fecal impaction of the 
colon. 2. Large amount of stool throughout the colon and rectum. 3. Dilated fluid-filled esophagus and dilated stomach. 4. Hepatic cysts. 5. Right renal cyst. 
6. Spondylosis with L2, L3 and L4 compression fractures. 
 NGT low suction NPO for now Check ua/mag Code Status: Full code Surrogate Decision Maker: DVT Prophylaxis: heparin GI Prophylaxis: not indicated Baseline: ambulatory Subjective: CHIEF COMPLAINT: abdominal pain/constiaption HISTORY OF PRESENT ILLNESS:    
Ladarius Johnson is a 68 y.o. male with PMHx significant for PD presents ambulatory from home to the ED with cc of difficulty with gait and coordinated movements that has been progressive since his PD medications were changed. Associated symptoms include constipation. He also endorses chronic low back pain related to compression fractures. No bladder issues. No weakness no tingling. No injury. Pt denies fevers, chills, night sweats, chest pain, pressure, SOB, LOPEZ, PND, orthopnea, n/v/d, melena, hematuria, dysuria, constipation, HA, dizziness, and syncope.  
  
 
 
 
 
 
We were asked to admit for work up and evaluation of the above problems. Past Medical History:  
Diagnosis Date  Mitral valve disorders(424.0) 11/19/2009 4/3/17 followed by PCP no cardiologist  
 Parkinson disease ([facility] [address]  Prostatism 11/19/2009  
 Skin cancer Past Surgical History:  
Procedure Laterality Date  HX APPENDECTOMY  childhood  HX COLONOSCOPY Social History Tobacco Use  Smoking status: Former Smoker  Smokeless tobacco: Never Used Substance Use Topics  Alcohol use: No  
  
 
No family history on file. No Known Allergies Prior to Admission medications Medication Sig Start Date End Date Taking? Authorizing Provider  
amantadine HCl (SYMMETREL) 100 mg capsule Take 100 mg by mouth three (3) times daily. Yes Other, MD Devan  
benztropine (COGENTIN) 2 mg tablet Take 2 mg by mouth three (3) times daily. Yes Other, MD Devan  
carbidopa-levodopa (SINEMET)  mg per tablet  3/19/18  Yes Provider, Historical  
aspirin (ASPIRIN) 325 mg tablet Take 325 mg by mouth daily. Yes Provider, Historical  
LYSINE PO Take  by mouth daily. Provider, Historical  
ALPHA LIPOIC ACID PO Take  by mouth daily. Provider, Historical  
TURMERIC ROOT EXTRACT PO Take  by mouth daily. Provider, Historical  
TURMERIC (CURCUMIN) by Does Not Apply route daily. Provider, Historical  
GLUTATHIONE 600 mg by Nasal route daily. Provider, Historical  
multivitamin (ONE A DAY) tablet Take 1 Tab by mouth daily. Provider, Historical  
ascorbic acid, vitamin C, (VITAMIN C) powd Take 1 Dose by mouth daily. Provider, Historical  
GINSENG PO Take 1 Dose by mouth daily. Provider, Historical  
METHYLSULFONYLMETHANE (MSM PO) Take 1 Tab by mouth daily. Provider, Historical  
MILK THISTLE, BULK, Take 1 Tab by mouth daily. Provider, Historical  
ACETYLCYSTEINE (NAC PO) Take 1 Tab by mouth daily. Provider, Historical  
IDEBENONE, BULK, Take 1 Tab by mouth daily. Provider, Historical  
trihexyphenidyl (ARTANE) 2 mg tablet Take 4 mg by mouth three (3) times daily. Provider, Historical  
SELENIUM (SELENIMIN PO) Take 1 Tab by mouth daily. Provider, Historical  
Edytaliz-Vshd-Qrwhlo-Hyalur Ac [id number] mg cap Take 1 Tab by mouth daily. Provider, Historical  
SAW PALMETTO XTR/ZINC PICOLIN (SAW PALMETTO EXTRACT PO) Take 1 Tab by mouth daily. With prostate complex    Provider, Historical  
LUTEIN EXTRACT/ZEAXANTHIN EXT (LUTEIN-ZEAXANTHIN PO) Take 1 Tab by mouth daily. Provider, Historical  
cyanocobalamin 1,000 mcg tablet Take 1,000 mcg by mouth daily. Provider, Historical  
cholecalciferol, VITAMIN D3, (VITAMIN D3) 5,000 unit tab tablet Take 5,000 Units by mouth two (2) times a week. Provider, Historical  
DOCOSAHEXANOIC ACID/EPA (FISH OIL PO) Take 1 Tab by mouth daily. Provider, Historical  
 
 
REVIEW OF SYSTEMS:    
I am not able to complete the review of systems because: The patient is intubated and sedated The patient has altered mental status due to his acute medical problems The patient has baseline aphasia from prior stroke(s) The patient has baseline dementia and is not reliable historian The patient is in acute medical distress and unable to provide information Total of 12 systems reviewed as follows:   
   POSITIVE= underlined text  Negative = text not underlined General:  fever, chills, sweats, generalized weakness, weight loss/gain,  
   loss of appetite Eyes:    blurred vision, eye pain, loss of vision, double vision ENT:    rhinorrhea, pharyngitis Respiratory:   cough, sputum production, SOB, LOPEZ, wheezing, pleuritic pain  
Cardiology:   chest pain, palpitations, orthopnea, PND, edema, syncope Gastrointestinal:  abdominal pain , N/V, diarrhea, dysphagia, constipation, bleeding Genitourinary:  frequency, urgency, dysuria, hematuria, incontinence Muskuloskeletal :  arthralgia, myalgia, back pain Hematology:  easy bruising, nose or gum bleeding, lymphadenopathy Dermatological: rash, ulceration, pruritis, color change / jaundice Endocrine:   hot flashes or polydipsia Neurological:  headache, dizziness, confusion, focal weakness, paresthesia, Speech difficulties, memory loss, gait difficulty tremors Psychological: Feelings of anxiety, depression, agitation Objective: VITALS:   
Visit Vitals BP (!) 126/96 Pulse 95 Temp 97.9 °F (36.6 °C) Resp 29 Ht 6' (1.829 m) Wt 70.8 kg (156 lb) SpO2 95% BMI 21.16 kg/m² PHYSICAL EXAM: 
 
 
_______________________________________________________________________ Care Plan discussed with: 
  Comments Patient x Family  x   
RN xx Care Manager Consultant:  x   
_______________________________________________________________________ Expected  Disposition:  
Home with Family x HH/PT/OT/RN   
SNF/LTC   
[facility]   
________________________________________________________________________ TOTAL TIME:  90  Minutes Critical Care Provided     Minutes non procedure based Comments  
 x Reviewed previous records  
>50% of visit spent in counseling and coordination of care x Discussion with patient and/or family and questions answered 
  
 
________________________________________________________________________ Signed: Pritesh Lilly MD 
 
Procedures: see electronic medical records for all procedures/Xrays and details which were not copied into this note but were reviewed prior to creation of Plan. LAB DATA REVIEWED:   
Recent Results (from the past 24 hour(s)) EKG, 12 LEAD, INITIAL Collection Time: 11/21/18 12:48 PM  
Result Value Ref Range Ventricular Rate 74 BPM  
 Atrial Rate 70 BPM  
 QRS Duration 108 ms Q-T Interval 412 ms QTC Calculation (Bezet) 457 ms Calculated R Axis -50 degrees Calculated T Axis 112 degrees Diagnosis Baseline artifact Sinus rhythm Left ventricular hypertrophy with QRS widening No previous ECGs available Confirmed by Ollie Frances ([id number]) on 11/21/2018 7:17:39 PM 
  
METABOLIC PANEL, COMPREHENSIVE Collection Time: 11/21/18  1:39 PM  
Result Value Ref Range Sodium 131 (L) 136 - 145 mmol/L Potassium 4.1 3.5 - 5.1 mmol/L Chloride 98 97 - 108 mmol/L  
 CO2 23 21 - 32 mmol/L Anion gap 10 5 - 15 mmol/L Glucose 178 (H) 65 - 100 mg/dL BUN 24 (H) 6 - 20 MG/DL Creatinine 1.42 (H) 0.70 - 1.30 MG/DL  
 BUN/Creatinine ratio 17 12 - 20 GFR est AA 59 (L) >60 ml/min/1.73m2 GFR est non-AA 49 (L) >60 ml/min/1.73m2 Calcium 9.5 8.5 - 10.1 MG/DL  Bilirubin, total 1.2 (H) 0.2 - 1.0 MG/DL  
 ALT (SGPT) 16 12 - 78 U/L  
 AST (SGOT) 36 15 - 37 U/L Alk. phosphatase 105 45 - 117 U/L Protein, total 8.2 6.4 - 8.2 g/dL Albumin 4.6 3.5 - 5.0 g/dL Globulin 3.6 2.0 - 4.0 g/dL A-G Ratio 1.3 1.1 - 2.2    
CBC WITH AUTOMATED DIFF Collection Time: 11/21/18  1:39 PM  
Result Value Ref Range WBC 10.0 4.1 - 11.1 K/uL  
 RBC 4.82 4.10 - 5.70 M/uL  
 HGB 15.3 12.1 - 17.0 g/dL HCT 46.1 36.6 - 50.3 % MCV 95.6 80.0 - 99.0 FL  
 MCH 31.7 26.0 - 34.0 PG  
 MCHC 33.2 30.0 - 36.5 g/dL  
 RDW 13.2 11.5 - 14.5 % PLATELET 127 591 - 699 K/uL MPV 9.6 8.9 - 12.9 FL  
 NRBC 0.0 0  WBC ABSOLUTE NRBC 0.00 0.00 - 0.01 K/uL NEUTROPHILS 88 (H) 32 - 75 % LYMPHOCYTES 7 (L) 12 - 49 % MONOCYTES 5 5 - 13 % EOSINOPHILS 0 0 - 7 % BASOPHILS 0 0 - 1 % IMMATURE GRANULOCYTES 0 0.0 - 0.5 % ABS. NEUTROPHILS 8.8 (H) 1.8 - 8.0 K/UL  
 ABS. LYMPHOCYTES 0.7 (L) 0.8 - 3.5 K/UL  
 ABS. MONOCYTES 0.5 0.0 - 1.0 K/UL  
 ABS. EOSINOPHILS 0.0 0.0 - 0.4 K/UL  
 ABS. BASOPHILS 0.0 0.0 - 0.1 K/UL  
 ABS. IMM. GRANS. 0.0 0.00 - 0.04 K/UL  
 DF AUTOMATED    
 RBC COMMENTS TREVOR CELLS 
PRESENT 
    
LIPASE Collection Time: 11/21/18  1:39 PM  
Result Value Ref Range Lipase 60 (L) 73 - 393 U/L  
LACTIC ACID Collection Time: 11/21/18  1:39 PM  
Result Value Ref Range Lactic acid 2.1 (HH) 0.4 - 2.0 MMOL/L  
TROPONIN I Collection Time: 11/21/18  1:39 PM  
Result Value Ref Range Troponin-I, Qt. <0.05 <0.05 ng/mL

## 2018-11-22 ENCOUNTER — APPOINTMENT (OUTPATIENT)
Dept: GENERAL RADIOLOGY | Age: 73
DRG: 389 | End: 2018-11-22
Attending: INTERNAL MEDICINE
Payer: MEDICARE

## 2018-11-22 PROBLEM — K56.609 BOWEL OBSTRUCTION (HCC): Status: ACTIVE | Noted: 2018-11-22

## 2018-11-22 LAB
ALBUMIN SERPL-MCNC: 3.7 G/DL (ref 3.5–5)
ALBUMIN/GLOB SERPL: 1.1 {RATIO} (ref 1.1–2.2)
ALP SERPL-CCNC: 83 U/L (ref 45–117)
ALT SERPL-CCNC: 14 U/L (ref 12–78)
ANION GAP SERPL CALC-SCNC: 9 MMOL/L (ref 5–15)
APPEARANCE UR: CLEAR
AST SERPL-CCNC: 47 U/L (ref 15–37)
BACTERIA URNS QL MICRO: NEGATIVE /HPF
BASOPHILS # BLD: 0 K/UL (ref 0–0.1)
BASOPHILS NFR BLD: 0 % (ref 0–1)
BILIRUB SERPL-MCNC: 1.2 MG/DL (ref 0.2–1)
BILIRUB UR QL: NEGATIVE
BUN SERPL-MCNC: 35 MG/DL (ref 6–20)
BUN/CREAT SERPL: 27 (ref 12–20)
CALCIUM SERPL-MCNC: 8.5 MG/DL (ref 8.5–10.1)
CHLORIDE SERPL-SCNC: 101 MMOL/L (ref 97–108)
CO2 SERPL-SCNC: 23 MMOL/L (ref 21–32)
COLOR UR: ABNORMAL
CREAT SERPL-MCNC: 1.28 MG/DL (ref 0.7–1.3)
DIFFERENTIAL METHOD BLD: ABNORMAL
EOSINOPHIL # BLD: 0 K/UL (ref 0–0.4)
EOSINOPHIL NFR BLD: 0 % (ref 0–7)
EPITH CASTS URNS QL MICRO: ABNORMAL /LPF
ERYTHROCYTE [DISTWIDTH] IN BLOOD BY AUTOMATED COUNT: 13.2 % (ref 11.5–14.5)
GLOBULIN SER CALC-MCNC: 3.4 G/DL (ref 2–4)
GLUCOSE SERPL-MCNC: 121 MG/DL (ref 65–100)
GLUCOSE UR STRIP.AUTO-MCNC: NEGATIVE MG/DL
HCT VFR BLD AUTO: 40.3 % (ref 36.6–50.3)
HGB BLD-MCNC: 13.7 G/DL (ref 12.1–17)
HGB UR QL STRIP: NEGATIVE
HYALINE CASTS URNS QL MICRO: ABNORMAL /LPF (ref 0–5)
IMM GRANULOCYTES # BLD: 0 K/UL (ref 0–0.04)
IMM GRANULOCYTES NFR BLD AUTO: 0 % (ref 0–0.5)
KETONES UR QL STRIP.AUTO: ABNORMAL MG/DL
LEUKOCYTE ESTERASE UR QL STRIP.AUTO: NEGATIVE
LYMPHOCYTES # BLD: 0.7 K/UL (ref 0.8–3.5)
LYMPHOCYTES NFR BLD: 6 % (ref 12–49)
MCH RBC QN AUTO: 31.9 PG (ref 26–34)
MCHC RBC AUTO-ENTMCNC: 34 G/DL (ref 30–36.5)
MCV RBC AUTO: 93.9 FL (ref 80–99)
MONOCYTES # BLD: 0.6 K/UL (ref 0–1)
MONOCYTES NFR BLD: 5 % (ref 5–13)
NEUTS SEG # BLD: 9.7 K/UL (ref 1.8–8)
NEUTS SEG NFR BLD: 89 % (ref 32–75)
NITRITE UR QL STRIP.AUTO: NEGATIVE
NRBC # BLD: 0 K/UL (ref 0–0.01)
NRBC BLD-RTO: 0 PER 100 WBC
PH UR STRIP: 6 [PH] (ref 5–8)
PLATELET # BLD AUTO: 153 K/UL (ref 150–400)
PMV BLD AUTO: 9.7 FL (ref 8.9–12.9)
POTASSIUM SERPL-SCNC: 4 MMOL/L (ref 3.5–5.1)
PROT SERPL-MCNC: 7.1 G/DL (ref 6.4–8.2)
PROT UR STRIP-MCNC: NEGATIVE MG/DL
RBC # BLD AUTO: 4.29 M/UL (ref 4.1–5.7)
RBC #/AREA URNS HPF: ABNORMAL /HPF (ref 0–5)
RBC MORPH BLD: ABNORMAL
SODIUM SERPL-SCNC: 133 MMOL/L (ref 136–145)
SP GR UR REFRACTOMETRY: 1.02 (ref 1–1.03)
TSH SERPL DL<=0.05 MIU/L-ACNC: 2.46 UIU/ML (ref 0.36–3.74)
UROBILINOGEN UR QL STRIP.AUTO: 0.2 EU/DL (ref 0.2–1)
WBC # BLD AUTO: 11 K/UL (ref 4.1–11.1)
WBC URNS QL MICRO: ABNORMAL /HPF (ref 0–4)

## 2018-11-22 PROCEDURE — 99218 HC RM OBSERVATION: CPT

## 2018-11-22 PROCEDURE — 65660000000 HC RM CCU STEPDOWN

## 2018-11-22 PROCEDURE — 99221 1ST HOSP IP/OBS SF/LOW 40: CPT | Performed by: FAMILY MEDICINE

## 2018-11-22 PROCEDURE — 77030008771 HC TU NG SALEM SUMP -A

## 2018-11-22 PROCEDURE — 85025 COMPLETE CBC W/AUTO DIFF WBC: CPT

## 2018-11-22 PROCEDURE — 74018 RADEX ABDOMEN 1 VIEW: CPT

## 2018-11-22 PROCEDURE — 74011000250 HC RX REV CODE- 250: Performed by: INTERNAL MEDICINE

## 2018-11-22 PROCEDURE — 74011250636 HC RX REV CODE- 250/636: Performed by: INTERNAL MEDICINE

## 2018-11-22 PROCEDURE — 74011250637 HC RX REV CODE- 250/637: Performed by: INTERNAL MEDICINE

## 2018-11-22 PROCEDURE — 77030019563 HC DEV ATTCH FEED HOLL -A

## 2018-11-22 PROCEDURE — 36415 COLL VENOUS BLD VENIPUNCTURE: CPT

## 2018-11-22 PROCEDURE — 84443 ASSAY THYROID STIM HORMONE: CPT

## 2018-11-22 PROCEDURE — 80053 COMPREHEN METABOLIC PANEL: CPT

## 2018-11-22 RX ORDER — FAMOTIDINE 20 MG/1
20 TABLET, FILM COATED ORAL 2 TIMES DAILY
Status: DISCONTINUED | OUTPATIENT
Start: 2018-11-22 | End: 2018-11-25 | Stop reason: HOSPADM

## 2018-11-22 RX ORDER — AMOXICILLIN 250 MG
1 CAPSULE ORAL DAILY
Status: DISCONTINUED | OUTPATIENT
Start: 2018-11-22 | End: 2018-11-23

## 2018-11-22 RX ORDER — HEPARIN SODIUM 5000 [USP'U]/ML
5000 INJECTION, SOLUTION INTRAVENOUS; SUBCUTANEOUS EVERY 8 HOURS
Status: DISCONTINUED | OUTPATIENT
Start: 2018-11-22 | End: 2018-11-25 | Stop reason: HOSPADM

## 2018-11-22 RX ORDER — POLYETHYLENE GLYCOL 3350 17 G/17G
17 POWDER, FOR SOLUTION ORAL DAILY
Status: DISCONTINUED | OUTPATIENT
Start: 2018-11-22 | End: 2018-11-23

## 2018-11-22 RX ADMIN — BENZTROPINE MESYLATE 1 MG: 1 TABLET ORAL at 15:44

## 2018-11-22 RX ADMIN — SENNOSIDES AND DOCUSATE SODIUM 1 TABLET: 8.6; 5 TABLET ORAL at 15:44

## 2018-11-22 RX ADMIN — AMANTADINE HYDROCHLORIDE 100 MG: 100 CAPSULE ORAL at 01:23

## 2018-11-22 RX ADMIN — Medication 10 ML: at 01:23

## 2018-11-22 RX ADMIN — HEPARIN SODIUM 5000 UNITS: 5000 INJECTION INTRAVENOUS; SUBCUTANEOUS at 06:09

## 2018-11-22 RX ADMIN — SODIUM CHLORIDE 100 ML/HR: 900 INJECTION, SOLUTION INTRAVENOUS at 02:00

## 2018-11-22 RX ADMIN — CARBIDOPA AND LEVODOPA 2 TABLET: 25; 100 TABLET ORAL at 23:12

## 2018-11-22 RX ADMIN — BENZTROPINE MESYLATE 1 MG: 1 TABLET ORAL at 23:12

## 2018-11-22 RX ADMIN — FAMOTIDINE 20 MG: 20 TABLET ORAL at 17:45

## 2018-11-22 RX ADMIN — Medication 10 ML: at 23:11

## 2018-11-22 RX ADMIN — CARBIDOPA AND LEVODOPA 2 TABLET: 25; 100 TABLET ORAL at 01:23

## 2018-11-22 RX ADMIN — POLYETHYLENE GLYCOL 3350 17 G: 17 POWDER, FOR SOLUTION ORAL at 15:43

## 2018-11-22 RX ADMIN — HEPARIN SODIUM 5000 UNITS: 5000 INJECTION, SOLUTION INTRAVENOUS; SUBCUTANEOUS at 23:12

## 2018-11-22 RX ADMIN — LACTULOSE 20 G: 20 SOLUTION ORAL at 15:43

## 2018-11-22 RX ADMIN — ASPIRIN 325 MG: 325 TABLET ORAL at 15:44

## 2018-11-22 RX ADMIN — AMANTADINE HYDROCHLORIDE 100 MG: 100 CAPSULE ORAL at 15:44

## 2018-11-22 RX ADMIN — BENZTROPINE MESYLATE 1 MG: 1 TABLET ORAL at 01:23

## 2018-11-22 RX ADMIN — CARBIDOPA AND LEVODOPA 2 TABLET: 25; 100 TABLET ORAL at 15:44

## 2018-11-22 RX ADMIN — Medication 10 ML: at 15:45

## 2018-11-22 RX ADMIN — AMANTADINE HYDROCHLORIDE 100 MG: 100 CAPSULE ORAL at 23:12

## 2018-11-22 RX ADMIN — HEPARIN SODIUM 5000 UNITS: 5000 INJECTION, SOLUTION INTRAVENOUS; SUBCUTANEOUS at 15:43

## 2018-11-22 RX ADMIN — Medication 10 ML: at 06:09

## 2018-11-22 NOTE — ED NOTES
Bedside shift change report given to Soha MOORE (oncoming nurse) by Gerson Pena RN (offgoing nurse). Report included the following information SBAR, Kardex, ED Summary and MAR.

## 2018-11-22 NOTE — ED NOTES
TRANSFER - IN REPORT: 
 
Verbal report received from TERESA Waite(name) on Kalia Coffman  being received from 2121(unit) for routine progression of care Report consisted of patients Situation, Background, Assessment and  
Recommendations(SBAR). Information from the following report(s) ED Summary was reviewed with the receiving nurse. Opportunity for questions and clarification was provided. Assessment completed upon patients arrival to unit and care assumed.

## 2018-11-22 NOTE — ED NOTES
Case d/w hospitalist, order needed for bed placement. It was accidentally missed when other orders being placed.

## 2018-11-22 NOTE — PROGRESS NOTES
Problem: Falls - Risk of 
Goal: *Absence of Falls Document April Rafael Fall Risk and appropriate interventions in the flowsheet. Outcome: Progressing Towards Goal 
Fall Risk Interventions: 
Mobility Interventions: Bed/chair exit alarm, Communicate number of staff needed for ambulation/transfer Mentation Interventions: Bed/chair exit alarm, Evaluate medications/consider consulting pharmacy Medication Interventions: Bed/chair exit alarm, Evaluate medications/consider consulting pharmacy Elimination Interventions: Elevated toilet seat, Patient to call for help with toileting needs

## 2018-11-22 NOTE — CONSULTS
NEUROLOGY CONSULT NOTE    Patient ID:  Gregg Palmer  [id number]  56 y.o.  1945    Date of Consultation:  November 22, 2018    Referring Physician: Dr. Myke Calles    Reason for Consultation:  Parkinson's disease    History of Present Illness:     Patient Active Problem List    Diagnosis Date Noted    Bowel obstruction ([facility] [address] 11/22/2018    Constipation 11/21/2018    Dependent edema 03/26/2018    Family history of colon cancer requiring screening colonoscopy 02/07/2018    Puncture wound to foot 02/07/2018    Parkinson's disease (tremor, stiffness, slow motion, unstable posture) ([facility] 12/08/2017    Annual physical exam 10/10/2017    Compression fracture of lumbar spine, non-traumatic, sequela 10/10/2017    Age-related osteoporosis with current pathological fracture 10/10/2017    Basal cell carcinoma of nose 02/04/2013    MVP (mitral valve prolapse) 11/19/2009    Prostatism 11/19/2009     Past Medical History:   Diagnosis Date    Mitral valve disorders(424.0) 11/19/2009    4/3/17 followed by PCP no cardiologist    Parkinson disease ([facility]     Prostatism 11/19/2009    Skin cancer       Past Surgical History:   Procedure Laterality Date    HX APPENDECTOMY  childhood    HX COLONOSCOPY        Prior to Admission medications    Medication Sig Start Date End Date Taking? Authorizing Provider   amantadine HCl (SYMMETREL) 100 mg capsule Take 100 mg by mouth three (3) times daily. Yes Other, MD Devan   benztropine (COGENTIN) 2 mg tablet Take 2 mg by mouth three (3) times daily. Yes Other, MD Devan   carbidopa-levodopa (SINEMET)  mg per tablet  3/19/18  Yes Provider, Historical   aspirin (ASPIRIN) 325 mg tablet Take 325 mg by mouth daily. Yes Provider, Historical   LYSINE PO Take  by mouth daily. Provider, Historical   ALPHA LIPOIC ACID PO Take  by mouth daily. Provider, Historical   TURMERIC ROOT EXTRACT PO Take  by mouth daily.     Provider, Historical   TURMERIC (CURCUMIN) by Does Not Apply route daily. Provider, Historical   GLUTATHIONE 600 mg by Nasal route daily. Provider, Historical   multivitamin (ONE A DAY) tablet Take 1 Tab by mouth daily. Provider, Historical   ascorbic acid, vitamin C, (VITAMIN C) powd Take 1 Dose by mouth daily. Provider, Historical   GINSENG PO Take 1 Dose by mouth daily. Provider, Historical   METHYLSULFONYLMETHANE (MSM PO) Take 1 Tab by mouth daily. Provider, Historical   MILK THISTLE, BULK, Take 1 Tab by mouth daily. Provider, Historical   ACETYLCYSTEINE (NAC PO) Take 1 Tab by mouth daily. Provider, Historical   IDEBENONE, BULK, Take 1 Tab by mouth daily. Provider, Historical   trihexyphenidyl (ARTANE) 2 mg tablet Take 4 mg by mouth three (3) times daily. Provider, Historical   SELENIUM (SELENIMIN PO) Take 1 Tab by mouth daily. Provider, Historical   Dgootejg-Mijv-Xzfriq-Hyalur Ac [id number] mg cap Take 1 Tab by mouth daily. Provider, Historical   SAW PALMETTO XTR/ZINC PICOLIN (SAW PALMETTO EXTRACT PO) Take 1 Tab by mouth daily. With prostate complex    Provider, Historical   LUTEIN EXTRACT/ZEAXANTHIN EXT (LUTEIN-ZEAXANTHIN PO) Take 1 Tab by mouth daily. Provider, Historical   cyanocobalamin 1,000 mcg tablet Take 1,000 mcg by mouth daily. Provider, Historical   cholecalciferol, VITAMIN D3, (VITAMIN D3) 5,000 unit tab tablet Take 5,000 Units by mouth two (2) times a week. Provider, Historical   DOCOSAHEXANOIC ACID/EPA (FISH OIL PO) Take 1 Tab by mouth daily. Provider, Historical     No Known Allergies   Social History     Tobacco Use    Smoking status: Former Smoker    Smokeless tobacco: Never Used   Substance Use Topics    Alcohol use: No      No family history on file. Subjective:      Kalia Coffman is a 68 y.o. WM with history of history of PD diagnosed in 2014 and is being followed by Dr. Daphne Sinclair who was admitted from the ER for small bowel obstruction and constipation.   Patient apparently showed up in the emergency room because of worsening issues with his walking as well as development of abdominal pain radiating to his back. Patient endorses a history of chronic low back pain secondary to compression fractures. He apparently only has bowel movements 3 times a week and was even less this past week. Patient reports recent adjustment of his PD medication by Dr. Suraj Juárez. Patient however admits that he does not take the medication as prescribed. He may take some of the medication several times a week. He admits not to be taking any of PD medication daily as prescribed. He also does not take his BP medications at exact times. He reports he is going to physical therapy twice a week either boxing therapy or yoga. Denies any recent falls. In the ER, blood pressure was 135/99. Laboratory workup revealed hyponatremia with sodium of 131, elevated creatinine and decreased GFR. Increased lactic acid. CT of the abdomen revealed small bowel obstruction secondary to colonic fecal impaction. Patient is supposed to be taking amantadine 100 mg 3 times daily, Cogentin 2 mg 3 times daily, Artane 4 mg 3 times daily and Sinemet 25/100,  2 3 times daily. Outside reports reviewed: none. Review of Systems:    Pertinent items are noted in HPI. Objective:     Patient Vitals for the past 8 hrs:   BP Temp Pulse Resp SpO2   11/22/18 1226 134/72 98.6 °F (37 °C) 71 20 90 %   11/22/18 0809 152/74 98.4 °F (36.9 °C) 78 22 93 %     NEUROLOGICAL EXAM:    Appearance: The patient is well developed, well nourished, provides a coherent history and is in no acute distress. Mental Status: Oriented to time, place and person. Fluent, no aphasia or dysarthria. Mask facies. Monotonous. Mood and affect appropriate. Cranial Nerves:   Intact visual fields. REYES, EOM's full, no nystagmus, no ptosis. Facial sensation is normal. Corneal reflexes are intact. Facial movement is symmetric. Hearing is normal bilaterally.  Palate is midline with normal sternocleidomastoid and trapezius muscles are normal. Tongue is midline. Motor:  5/5 strength. Normal bulk and tone. No cogwheel rigidity. Mild bradykinesia. Reflexes:   Deep tendon reflexes 2+/4 and symmetrical. Toes downgoing. Sensory:   Normal to cold, pinprick and vibration. Gait:  Difficulty sitting from supine and from supine to standing. Shuffling gait with poor arm swing. Tremor:   Mainly resting tremors. Cerebellar:  Intact FTN/TAYLOR/HTS. Neurovascular:  Normal heart sounds and regular rhythm, peripheral pulses intact, and no carotid bruits. Imaging  Lab Review    Recent Results (from the past 24 hour(s))   METABOLIC PANEL, COMPREHENSIVE    Collection Time: 11/21/18  1:39 PM   Result Value Ref Range    Sodium 131 (L) 136 - 145 mmol/L    Potassium 4.1 3.5 - 5.1 mmol/L    Chloride 98 97 - 108 mmol/L    CO2 23 21 - 32 mmol/L    Anion gap 10 5 - 15 mmol/L    Glucose 178 (H) 65 - 100 mg/dL    BUN 24 (H) 6 - 20 MG/DL    Creatinine 1.42 (H) 0.70 - 1.30 MG/DL    BUN/Creatinine ratio 17 12 - 20      GFR est AA 59 (L) >60 ml/min/1.73m2    GFR est non-AA 49 (L) >60 ml/min/1.73m2    Calcium 9.5 8.5 - 10.1 MG/DL    Bilirubin, total 1.2 (H) 0.2 - 1.0 MG/DL    ALT (SGPT) 16 12 - 78 U/L    AST (SGOT) 36 15 - 37 U/L    Alk.  phosphatase 105 45 - 117 U/L    Protein, total 8.2 6.4 - 8.2 g/dL    Albumin 4.6 3.5 - 5.0 g/dL    Globulin 3.6 2.0 - 4.0 g/dL    A-G Ratio 1.3 1.1 - 2.2     CBC WITH AUTOMATED DIFF    Collection Time: 11/21/18  1:39 PM   Result Value Ref Range    WBC 10.0 4.1 - 11.1 K/uL    RBC 4.82 4.10 - 5.70 M/uL    HGB 15.3 12.1 - 17.0 g/dL    HCT 46.1 36.6 - 50.3 %    MCV 95.6 80.0 - 99.0 FL    MCH 31.7 26.0 - 34.0 PG    MCHC 33.2 30.0 - 36.5 g/dL    RDW 13.2 11.5 - 14.5 %    PLATELET 961 789 - 457 K/uL    MPV 9.6 8.9 - 12.9 FL    NRBC 0.0 0  WBC    ABSOLUTE NRBC 0.00 0.00 - 0.01 K/uL    NEUTROPHILS 88 (H) 32 - 75 %    LYMPHOCYTES 7 (L) 12 - 49 %    MONOCYTES 5 5 - 13 % EOSINOPHILS 0 0 - 7 %    BASOPHILS 0 0 - 1 %    IMMATURE GRANULOCYTES 0 0.0 - 0.5 %    ABS. NEUTROPHILS 8.8 (H) 1.8 - 8.0 K/UL    ABS. LYMPHOCYTES 0.7 (L) 0.8 - 3.5 K/UL    ABS. MONOCYTES 0.5 0.0 - 1.0 K/UL    ABS. EOSINOPHILS 0.0 0.0 - 0.4 K/UL    ABS. BASOPHILS 0.0 0.0 - 0.1 K/UL    ABS. IMM.  GRANS. 0.0 0.00 - 0.04 K/UL    DF AUTOMATED      RBC COMMENTS TREVOR CELLS  PRESENT       LIPASE    Collection Time: 11/21/18  1:39 PM   Result Value Ref Range    Lipase 60 (L) 73 - 393 U/L   LACTIC ACID    Collection Time: 11/21/18  1:39 PM   Result Value Ref Range    Lactic acid 2.1 (HH) 0.4 - 2.0 MMOL/L   TROPONIN I    Collection Time: 11/21/18  1:39 PM   Result Value Ref Range    Troponin-I, Qt. <0.05 <0.05 ng/mL   MAGNESIUM    Collection Time: 11/21/18  1:39 PM   Result Value Ref Range    Magnesium 2.0 1.6 - 2.4 mg/dL   LACTIC ACID    Collection Time: 11/21/18  5:52 PM   Result Value Ref Range    Lactic acid 2.5 (HH) 0.4 - 2.0 MMOL/L   URINALYSIS W/MICROSCOPIC    Collection Time: 11/21/18 11:59 PM   Result Value Ref Range    Color YELLOW/STRAW      Appearance CLEAR CLEAR      Specific gravity 1.020 1.003 - 1.030      pH (UA) 6.0 5.0 - 8.0      Protein NEGATIVE  NEG mg/dL    Glucose NEGATIVE  NEG mg/dL    Ketone TRACE (A) NEG mg/dL    Bilirubin NEGATIVE  NEG      Blood NEGATIVE  NEG      Urobilinogen 0.2 0.2 - 1.0 EU/dL    Nitrites NEGATIVE  NEG      Leukocyte Esterase NEGATIVE  NEG      WBC 0-4 0 - 4 /hpf    RBC 0-5 0 - 5 /hpf    Epithelial cells FEW FEW /lpf    Bacteria NEGATIVE  NEG /hpf    Hyaline cast 2-5 0 - 5 /lpf   METABOLIC PANEL, COMPREHENSIVE    Collection Time: 11/22/18  6:24 AM   Result Value Ref Range    Sodium 133 (L) 136 - 145 mmol/L    Potassium 4.0 3.5 - 5.1 mmol/L    Chloride 101 97 - 108 mmol/L    CO2 23 21 - 32 mmol/L    Anion gap 9 5 - 15 mmol/L    Glucose 121 (H) 65 - 100 mg/dL    BUN 35 (H) 6 - 20 MG/DL    Creatinine 1.28 0.70 - 1.30 MG/DL    BUN/Creatinine ratio 27 (H) 12 - 20      GFR est AA >60 >60 ml/min/1.73m2    GFR est non-AA 55 (L) >60 ml/min/1.73m2    Calcium 8.5 8.5 - 10.1 MG/DL    Bilirubin, total 1.2 (H) 0.2 - 1.0 MG/DL    ALT (SGPT) 14 12 - 78 U/L    AST (SGOT) 47 (H) 15 - 37 U/L    Alk. phosphatase 83 45 - 117 U/L    Protein, total 7.1 6.4 - 8.2 g/dL    Albumin 3.7 3.5 - 5.0 g/dL    Globulin 3.4 2.0 - 4.0 g/dL    A-G Ratio 1.1 1.1 - 2.2     CBC WITH AUTOMATED DIFF    Collection Time: 11/22/18  6:24 AM   Result Value Ref Range    WBC 11.0 4.1 - 11.1 K/uL    RBC 4.29 4. 10 - 5.70 M/uL    HGB 13.7 12.1 - 17.0 g/dL    HCT 40.3 36.6 - 50.3 %    MCV 93.9 80.0 - 99.0 FL    MCH 31.9 26.0 - 34.0 PG    MCHC 34.0 30.0 - 36.5 g/dL    RDW 13.2 11.5 - 14.5 %    PLATELET 370 850 - 104 K/uL    MPV 9.7 8.9 - 12.9 FL    NRBC 0.0 0  WBC    ABSOLUTE NRBC 0.00 0.00 - 0.01 K/uL    NEUTROPHILS 89 (H) 32 - 75 %    LYMPHOCYTES 6 (L) 12 - 49 %    MONOCYTES 5 5 - 13 %    EOSINOPHILS 0 0 - 7 %    BASOPHILS 0 0 - 1 %    IMMATURE GRANULOCYTES 0 0.0 - 0.5 %    ABS. NEUTROPHILS 9.7 (H) 1.8 - 8.0 K/UL    ABS. LYMPHOCYTES 0.7 (L) 0.8 - 3.5 K/UL    ABS. MONOCYTES 0.6 0.0 - 1.0 K/UL    ABS. EOSINOPHILS 0.0 0.0 - 0.4 K/UL    ABS. BASOPHILS 0.0 0.0 - 0.1 K/UL    ABS. IMM. GRANS. 0.0 0.00 - 0.04 K/UL    DF AUTOMATED      RBC COMMENTS NORMOCYTIC, NORMOCHROMIC     TSH 3RD GENERATION    Collection Time: 11/22/18  6:24 AM   Result Value Ref Range    TSH 2.46 0.36 - 3.74 uIU/mL         Assessment:   Parkinson's disease  Medication non-compliance  Constipation    Plan:   Neurological examination reveals masked faces, monotonous voice, mild bradykinesia, resting upper extremity tremors, significant postural instability, shuffling gait and decreased arm swing. No cogwheel rigidity. Findings consistent with Parkinson's disease. Main issue this patient is that he is not compliant with his medication regimen. PD medications need to be taken at the exact time and exact intervals religiously. This was emphasized to the patient. Patient understood. Plan for now is to actually give the patient the exact dosages of his Parkinson's medication as prescribed. Then adjustments will be made based on how he does with the actual dosing. If he develops dyskinesias then I will go ahead and adjust and taper off some of the medications. It was also explained to the patient that constipation is part of Parkinson's disease and his Parkinson's medication will actually worsen his constipation. Sometimes the limiting benefit of Parkinson's medication is the severity of the constipation. Patient will require bowel regimen to prevent this. Recommend Baseline evaluations by PT and OT and speech.     Thank you for the consult

## 2018-11-22 NOTE — CONSULTS
[address]  [address] [place] [address], [address]  [phone]                     GI CONSULTATION NOTE      NAME:  Luis Fernando Becerra   :   1945   MRN:   [id number]       Referring Physician: Hospitalist Service    Consult Date: 2018     Chief Complaint: small bowel obstruction    History of Present Illness:  Patient is a 68 y.o. who is seen in consultation at the request for small bowel obstruction. Mr Jacobs presents ambulatory from [facility] the ED with cc of difficulty with gait and coordinated movements that has been progressive since his PD medications were changed. Associated symptoms include constipation. He also endorses chronic low back pain related to compression fractures. No bladder issues. No weakness no tingling. No injury. Pt denies fevers, chills, night sweats, chest pain, pressure, SOB, LOPEZ, PND, orthopnea, n/v/d, melena, hematuria, dysuria, constipation, HA, dizziness, and syncope.    CT scan was done that showed dilated stomach and fluid filled esophagus          PMH:  Past Medical History:   Diagnosis Date    Mitral valve disorders(424.0) 2009    4/3/17 followed by PCP no cardiologist    Parkinson disease ([facility] [address]     Prostatism 2009    Skin cancer        PSH:  Past Surgical History:   Procedure Laterality Date    HX APPENDECTOMY  childhood    HX COLONOSCOPY         Allergies:  No Known Allergies    Home Medications:  Prior to Admission Medications   Prescriptions Last Dose Informant Patient Reported? Taking? ACETYLCYSTEINE (NAC PO)   Yes No   Sig: Take 1 Tab by mouth daily. ALPHA LIPOIC ACID PO   Yes No   Sig: Take  by mouth daily. DOCOSAHEXANOIC ACID/EPA (FISH OIL PO)   Yes No   Sig: Take 1 Tab by mouth daily. GINSENG PO   Yes No   Sig: Take 1 Dose by mouth daily. GLUTATHIONE   Yes No   Si mg by Nasal route daily. Olsltrrd-Mrqc-Osmfiu-Hyalur Ac 490-209-60-2 mg cap   Yes No   Sig: Take 1 Tab by mouth daily.    IDEBENONE, BULK,   Yes No   Sig: Take 1 Tab by mouth daily. LUTEIN EXTRACT/ZEAXANTHIN EXT (LUTEIN-ZEAXANTHIN PO)   Yes No   Sig: Take 1 Tab by mouth daily. LYSINE PO   Yes No   Sig: Take  by mouth daily. METHYLSULFONYLMETHANE (MSM PO)   Yes No   Sig: Take 1 Tab by mouth daily. MILK THISTLE, BULK,   Yes No   Sig: Take 1 Tab by mouth daily. SAW PALMETTO XTR/ZINC PICOLIN (SAW PALMETTO EXTRACT PO)   Yes No   Sig: Take 1 Tab by mouth daily. With prostate complex   SELENIUM (SELENIMIN PO)   Yes No   Sig: Take 1 Tab by mouth daily. TURMERIC (CURCUMIN)   Yes No   Sig: by Does Not Apply route daily. TURMERIC ROOT EXTRACT PO   Yes No   Sig: Take  by mouth daily. amantadine HCl (SYMMETREL) 100 mg capsule 11/21/2018 at 0900  Yes Yes   Sig: Take 100 mg by mouth three (3) times daily. ascorbic acid, vitamin C, (VITAMIN C) powd   Yes No   Sig: Take 1 Dose by mouth daily. aspirin (ASPIRIN) 325 mg tablet 11/14/2018 at Unknown time  Yes Yes   Sig: Take 325 mg by mouth daily. benztropine (COGENTIN) 2 mg tablet 11/21/2018 at 0900  Yes Yes   Sig: Take 2 mg by mouth three (3) times daily. carbidopa-levodopa (SINEMET)  mg per tablet 11/21/2018 at 0900  Yes Yes   cholecalciferol, VITAMIN D3, (VITAMIN D3) 5,000 unit tab tablet   Yes No   Sig: Take 5,000 Units by mouth two (2) times a week. cyanocobalamin 1,000 mcg tablet   Yes No   Sig: Take 1,000 mcg by mouth daily. multivitamin (ONE A DAY) tablet   Yes No   Sig: Take 1 Tab by mouth daily. trihexyphenidyl (ARTANE) 2 mg tablet   Yes No   Sig: Take 4 mg by mouth three (3) times daily.       Facility-Administered Medications: None       Hospital Medications:  Current Facility-Administered Medications   Medication Dose Route Frequency    senna-docusate (PERICOLACE) 8.6-50 mg per tablet 1 Tab  1 Tab Oral DAILY    polyethylene glycol (MIRALAX) packet 17 g  17 g Oral DAILY    lactulose (CHRONULAC) solution 20 g  30 mL Oral PRN    famotidine (PEPCID) tablet 20 mg  20 mg Oral BID    nitroglycerin (NITROBID) 2 % ointment 1 Inch  1 Inch Topical Q6H PRN    heparin (porcine) injection 5,000 Units  5,000 Units SubCUTAneous Q8H    sodium chloride (NS) flush 5-10 mL  5-10 mL IntraVENous Q8H    sodium chloride (NS) flush 5-10 mL  5-10 mL IntraVENous PRN    0.9% sodium chloride infusion  125 mL/hr IntraVENous CONTINUOUS    acetaminophen (TYLENOL) tablet 650 mg  650 mg Oral Q4H PRN    amantadine HCl (SYMMETREL) capsule 100 mg  100 mg Oral TID    aspirin tablet 325 mg  325 mg Oral DAILY    benztropine (COGENTIN) tablet 1 mg  1 mg Oral TID    carbidopa-levodopa (SINEMET)  mg per tablet 2 Tab  2 Tab Oral TID    ondansetron (ZOFRAN) injection 4 mg  4 mg IntraVENous Q4H PRN       Social History:  Social History     Tobacco Use    Smoking status: Former Smoker    Smokeless tobacco: Never Used   Substance Use Topics    Alcohol use: No       Family History:  No family history on file. Review of Systems:    Constitutional: negative fever, negative chills, negative weight loss  Eyes:   negative visual changes  ENT:   negative sore throat, tongue or lip swelling  Respiratory:  negative cough, negative dyspnea  Cards:  negative for chest pain, palpitations, lower extremity edema  GI:   See HPI  :  negative for frequency, dysuria  Integument:  negative for rash and pruritus  Heme:  negative for easy bruising and gum/nose bleeding  Musculoskel: negative for myalgias,  back pain and muscle weakness  Neuro: negative for headaches, dizziness, vertigo  Psych:  negative for feelings of anxiety, depression      Objective:     Patient Vitals for the past 8 hrs:   BP Temp Pulse Resp SpO2   11/22/18 1427 139/78 98.2 °F (36.8 °C) 71 19 93 %   11/22/18 1226 134/72 98.6 °F (37 °C) 71 20 90 %     No intake/output data recorded. 11/20 1901 - 11/22 0700  In: 2325.8 [P.O.:100; I.V.:2225.8]  Out: -         PHYSICAL EXAM:  General: WD, WN.  Alert, cooperative, no acute distress    HEENT: NC, Atraumatic. PERRLA, EOMI. Anicteric sclerae. Lungs:  CTA Bilaterally. No Wheezing/Rhonchi/Rales. Heart:  Regular  rhythm,  No murmur (), No Rubs, No Gallops  Abdomen: Soft, distended, Non tender.  +Bowel sounds, no HSM  Extremities: No c/c/e  Neurologic:  CN 2-12 gi, Alert and oriented X 3. No acute neurological distress   Psych:   Good insight. Not anxious nor agitated. Data Review     Recent Labs     11/22/18  0624 11/21/18  1339   WBC 11.0 10.0   HGB 13.7 15.3   HCT 40.3 46.1    164     Recent Labs     11/22/18  0624 11/21/18  1339   * 131*   K 4.0 4.1    98   CO2 23 23   BUN 35* 24*   CREA 1.28 1.42*   * 178*   CA 8.5 9.5     Recent Labs     11/22/18  0624 11/21/18  1339   SGOT 47* 36   AP 83 105   TP 7.1 8.2   ALB 3.7 4.6   GLOB 3.4 3.6   LPSE  --  60*     No results for input(s): INR, PTP, APTT in the last 72 hours. No lab exists for component: INREXT     Imaging studies reviewed          Assessment:   Patient Active Problem List   Diagnosis Code    MVP (mitral valve prolapse) I34.1    Prostatism N40.0    Basal cell carcinoma of nose C44.311    Annual physical exam Z00.00    Compression fracture of lumbar spine, non-traumatic, sequela M48.56XS    Age-related osteoporosis with current pathological fracture M80.00XA    Parkinson's disease (tremor, stiffness, slow motion, unstable posture) ([facility] [address] G20    Family history of colon cancer requiring screening colonoscopy Z80.0    Puncture wound to foot S91.339A    Dependent edema R60.9    Constipation K59.00    Bowel obstruction ([address] K56.609                      Plan:   Small bowel obstruction is likely related to large amount of stool in the colon. No definite point of obstruction is noted. Recommend nothing by mouth, IV fluids and NG placement. Would also give soapsuds enemas and once cleared out give lactulose to flush the colon.   Surgery consult  Will follow along closely with you  Thanks for the kind referral

## 2018-11-22 NOTE — PROGRESS NOTES
Pt requires an NGT for SBO. RN went into room at 12:50 to place tube, pt requested that RN come back in one hour. RN went back in at 14:05, pt refused NGT. RN explained reason to have the NGT, pt still refused stating that they tried four times yesterday and his nose was still sore. Dr Juliana Ventura informed. 16:40 SSE given, 300 mL

## 2018-11-22 NOTE — ED NOTES
Pt at the end of the stretcher and states he wants to walk to the bathroom. Pt assisted to bedside commode and urinated. Pt is impulsive and instructed to slow down so he will not fall getting off and on stretcher. Pt assisted back onto stretcher.

## 2018-11-22 NOTE — PROGRESS NOTES
Hospitalist Progress Note NAME: Wolf Hoffmann :  1945 MRN:  [id number] Assessment / Plan: 
Small bowel obstruction with severe constipation and dilated stomach/esophagus of uncertain etiology: 
- CT A/P with small bowel obstruction possibly secondary to marked fecal impaction of the colon. Large amount of stool throughout the colon and rectum. Dilated fluid-filled esophagus and dilated stomach. Hepatic cysts. Spondylosis with L2, L3 and L4 compression fractures. - received enema last night, will repeat this AM 
- bowel regimen started - KUB this morning - GI and general surgery consulted 
- con't liquid diet for now - IV fluids increased with rising lactic acid this morning Parkinson's disease with increasing motor dysfunction: diagnosed in , felt he was doing fairly well until this last week 
- no recent brain imaging 
- neurology consulted for med recs 
- con't home amantadine, cogentin and sinemet for now. Takes lots of supplements at home too. Mitral valve prolapse Compression fracture of L2, L3 and L4: denies pain - tylenol if needed 
- PT/OT consulted Hyponatremia, mild: resolving Feel elevated Cr not likely MAINOR, probably baseline CKD2 
  
Code Status: Full code Surrogate Decision Maker: wife Eliezer Montgomery [phone]  
DVT Prophylaxis: heparin  
  
Baseline: exercise classes 3x per week, function Subjective: Chief Complaint / Reason for Physician Visit Denies pain at this time. Did have some stool output with enema overnight. Discussed with RN events overnight. Review of Systems: 
Symptom Y/N Comments  Symptom Y/N Comments Fever/Chills n   Chest Pain n   
Poor Appetite n   Edema n   
Cough n   Abdominal Pain n   
Sputum n   Joint Pain SOB/LOPEZ n   Pruritis/Rash Nausea/vomit    Tolerating PT/OT Diarrhea    Tolerating Diet Constipation    Other Could NOT obtain due to:   
 
Objective: VITALS:  
 Last 24hrs VS reviewed since prior progress note. Most recent are: 
Patient Vitals for the past 24 hrs: 
 Temp Pulse Resp BP SpO2  
11/22/18 0809 98.4 °F (36.9 °C) 78 22 152/74 93 % 11/22/18 0200 99 °F (37.2 °C) 91 28 142/89 94 % 11/21/18 2210 97.3 °F (36.3 °C) 89 24 151/89 97 % 11/1945 97.7 °F (36.5 °C) 88 24 144/88 95 % 11/21/18 1744  95 29  95 % 11/21/18 1700  93 (!) 34 (!) 126/96 95 % 11/21/18 1630  87 27 151/85 93 % 11/21/18 1600  89 (!) 34 140/81   
11/21/18 1544    156/80 96 % 11/21/18 1500  85 (!) 35 128/80   
11/21/18 1444  81 (!) 35  95 % 11/21/18 1430  71 26 142/80   
11/21/18 1425  78 (!) 32 122/74   
11/21/18 1416 97.9 °F (36.6 °C)      
11/21/18 1256  76 16 (!) 135/99 100 % Intake/Output Summary (Last 24 hours) at 11/22/2018 8734 Last data filed at 11/22/2018 0600 Gross per 24 hour Intake 2325.83 ml Output  Net 2325.83 ml PHYSICAL EXAM: 
General: WD, WN. Alert, cooperative, no acute distress   
EENT:  EOMI. Anicteric sclerae. MMM Resp:  CTA bilaterally, no wheezing or rales. No accessory muscle use CV:  Regular  rhythm,  No edema GI:  Soft, Non distended, Non tender.  +Bowel sounds Neurologic:  Alert and oriented X 3, normal speech, but difficult to understand Psych:   Fair insight. Not anxious nor agitated Skin:  Pale, No rashes. No jaundice Reviewed most current lab test results and cultures  YES Reviewed most current radiology test results   YES Review and summation of old records today    NO Reviewed patient's current orders and MAR    YES 
PMH/SH reviewed - no change compared to H&P 
________________________________________________________________________ Care Plan discussed with: 
  Comments Patient x Family RN x Care Manager Consultant Multidiciplinary team rounds were held today with , nursing, pharmacist and clinical coordinator.   Patient's plan of care was discussed; medications were reviewed and discharge planning was addressed. ________________________________________________________________________ Total NON critical care TIME:  35 Minutes Total CRITICAL CARE TIME Spent:   Minutes non procedure based Comments >50% of visit spent in counseling and coordination of care x   
________________________________________________________________________ Shara Lam MD  
 
Procedures: see electronic medical records for all procedures/Xrays and details which were not copied into this note but were reviewed prior to creation of Plan. LABS: 
I reviewed today's most current labs and imaging studies. Pertinent labs include: 
Recent Labs  
  11/22/18 
0624 11/21/18 
1339 WBC 11.0 10.0 HGB 13.7 15.3 HCT 40.3 46.1  164 Recent Labs  
  11/22/18 
0624 11/21/18 
1339 * 131* K 4.0 4.1  98 CO2 23 23 * 178* BUN 35* 24* CREA 1.28 1.42* CA 8.5 9.5 MG  --  2.0 ALB 3.7 4.6 TBILI 1.2* 1.2* SGOT 47* 36 ALT 14 16 Signed: Shara Lam MD

## 2018-11-22 NOTE — CONSULTS
Surgery      Surgical consult for Parkinson's patient with CT scan showing:\"MPRESSION:   1. Small bowel obstruction possibly secondary to marked fecal impaction of the  colon. 2. Large amount of stool throughout the colon and rectum. 3. Dilated fluid-filled esophagus and dilated stomach. Abd films today show: An AP radiograph of the abdomen demonstrates dilated large and small bowel loops  throughout the abdomen and pelvis. The small bowel dilatation is out of  proportion to the large bowel dilatation. Pt reports small bowel movement    NG tube apparently was never placed    No vomiting today, feels a bit better    Abd soft, non tender, no guarding or rebound    Suggest findings are all likely related to constipation and Parkinson's meds. Suggest NPO except for meds for now  Miralax   Colace BID    Will check again tomorrow      Gordon Ganser.  Alex Stanley  [id number] [facility]

## 2018-11-23 ENCOUNTER — APPOINTMENT (OUTPATIENT)
Dept: GENERAL RADIOLOGY | Age: 73
DRG: 389 | End: 2018-11-23
Attending: INTERNAL MEDICINE
Payer: MEDICARE

## 2018-11-23 LAB
ANION GAP SERPL CALC-SCNC: 7 MMOL/L (ref 5–15)
BUN SERPL-MCNC: 26 MG/DL (ref 6–20)
BUN/CREAT SERPL: 27 (ref 12–20)
CALCIUM SERPL-MCNC: 7.8 MG/DL (ref 8.5–10.1)
CHLORIDE SERPL-SCNC: 108 MMOL/L (ref 97–108)
CO2 SERPL-SCNC: 23 MMOL/L (ref 21–32)
CREAT SERPL-MCNC: 0.95 MG/DL (ref 0.7–1.3)
ERYTHROCYTE [DISTWIDTH] IN BLOOD BY AUTOMATED COUNT: 13.4 % (ref 11.5–14.5)
GLUCOSE SERPL-MCNC: 80 MG/DL (ref 65–100)
HCT VFR BLD AUTO: 34.9 % (ref 36.6–50.3)
HGB BLD-MCNC: 12.1 G/DL (ref 12.1–17)
MCH RBC QN AUTO: 32.4 PG (ref 26–34)
MCHC RBC AUTO-ENTMCNC: 34.7 G/DL (ref 30–36.5)
MCV RBC AUTO: 93.3 FL (ref 80–99)
NRBC # BLD: 0 K/UL (ref 0–0.01)
NRBC BLD-RTO: 0 PER 100 WBC
PLATELET # BLD AUTO: 130 K/UL (ref 150–400)
PMV BLD AUTO: 9.6 FL (ref 8.9–12.9)
POTASSIUM SERPL-SCNC: 3.9 MMOL/L (ref 3.5–5.1)
RBC # BLD AUTO: 3.74 M/UL (ref 4.1–5.7)
SODIUM SERPL-SCNC: 138 MMOL/L (ref 136–145)
WBC # BLD AUTO: 9.1 K/UL (ref 4.1–11.1)

## 2018-11-23 PROCEDURE — 74011250636 HC RX REV CODE- 250/636: Performed by: INTERNAL MEDICINE

## 2018-11-23 PROCEDURE — 85027 COMPLETE CBC AUTOMATED: CPT

## 2018-11-23 PROCEDURE — G8987 SELF CARE CURRENT STATUS: HCPCS

## 2018-11-23 PROCEDURE — 65660000000 HC RM CCU STEPDOWN

## 2018-11-23 PROCEDURE — 97116 GAIT TRAINING THERAPY: CPT | Performed by: PHYSICAL THERAPIST

## 2018-11-23 PROCEDURE — 99231 SBSQ HOSP IP/OBS SF/LOW 25: CPT | Performed by: FAMILY MEDICINE

## 2018-11-23 PROCEDURE — 74011000250 HC RX REV CODE- 250: Performed by: INTERNAL MEDICINE

## 2018-11-23 PROCEDURE — 97165 OT EVAL LOW COMPLEX 30 MIN: CPT

## 2018-11-23 PROCEDURE — 74011250637 HC RX REV CODE- 250/637: Performed by: INTERNAL MEDICINE

## 2018-11-23 PROCEDURE — 80048 BASIC METABOLIC PNL TOTAL CA: CPT

## 2018-11-23 PROCEDURE — 97161 PT EVAL LOW COMPLEX 20 MIN: CPT | Performed by: PHYSICAL THERAPIST

## 2018-11-23 PROCEDURE — G8979 MOBILITY GOAL STATUS: HCPCS | Performed by: PHYSICAL THERAPIST

## 2018-11-23 PROCEDURE — G8978 MOBILITY CURRENT STATUS: HCPCS | Performed by: PHYSICAL THERAPIST

## 2018-11-23 PROCEDURE — G8988 SELF CARE GOAL STATUS: HCPCS

## 2018-11-23 PROCEDURE — 97535 SELF CARE MNGMENT TRAINING: CPT

## 2018-11-23 PROCEDURE — 36415 COLL VENOUS BLD VENIPUNCTURE: CPT

## 2018-11-23 PROCEDURE — 74018 RADEX ABDOMEN 1 VIEW: CPT

## 2018-11-23 RX ORDER — POLYETHYLENE GLYCOL 3350 17 G/17G
17 POWDER, FOR SOLUTION ORAL 2 TIMES DAILY
Status: DISCONTINUED | OUTPATIENT
Start: 2018-11-23 | End: 2018-11-25 | Stop reason: HOSPADM

## 2018-11-23 RX ORDER — DOCUSATE SODIUM 100 MG/1
100 CAPSULE, LIQUID FILLED ORAL 2 TIMES DAILY
Status: DISCONTINUED | OUTPATIENT
Start: 2018-11-23 | End: 2018-11-25 | Stop reason: HOSPADM

## 2018-11-23 RX ADMIN — BENZTROPINE MESYLATE 1 MG: 1 TABLET ORAL at 10:07

## 2018-11-23 RX ADMIN — BENZTROPINE MESYLATE 1 MG: 1 TABLET ORAL at 22:11

## 2018-11-23 RX ADMIN — ASPIRIN 325 MG: 325 TABLET ORAL at 10:07

## 2018-11-23 RX ADMIN — HEPARIN SODIUM 5000 UNITS: 5000 INJECTION, SOLUTION INTRAVENOUS; SUBCUTANEOUS at 06:00

## 2018-11-23 RX ADMIN — POLYETHYLENE GLYCOL 3350 17 G: 17 POWDER, FOR SOLUTION ORAL at 10:07

## 2018-11-23 RX ADMIN — POLYETHYLENE GLYCOL 3350 17 G: 17 POWDER, FOR SOLUTION ORAL at 22:11

## 2018-11-23 RX ADMIN — SENNOSIDES AND DOCUSATE SODIUM 1 TABLET: 8.6; 5 TABLET ORAL at 10:07

## 2018-11-23 RX ADMIN — Medication 10 ML: at 05:51

## 2018-11-23 RX ADMIN — AMANTADINE HYDROCHLORIDE 100 MG: 100 CAPSULE ORAL at 22:10

## 2018-11-23 RX ADMIN — FAMOTIDINE 20 MG: 20 TABLET ORAL at 22:10

## 2018-11-23 RX ADMIN — DOCUSATE SODIUM 100 MG: 100 CAPSULE, LIQUID FILLED ORAL at 22:12

## 2018-11-23 RX ADMIN — CARBIDOPA AND LEVODOPA 2 TABLET: 25; 100 TABLET ORAL at 22:10

## 2018-11-23 RX ADMIN — AMANTADINE HYDROCHLORIDE 100 MG: 100 CAPSULE ORAL at 10:07

## 2018-11-23 RX ADMIN — SODIUM CHLORIDE 125 ML/HR: 900 INJECTION, SOLUTION INTRAVENOUS at 01:25

## 2018-11-23 RX ADMIN — CARBIDOPA AND LEVODOPA 2 TABLET: 25; 100 TABLET ORAL at 10:07

## 2018-11-23 RX ADMIN — Medication 10 ML: at 22:09

## 2018-11-23 RX ADMIN — FAMOTIDINE 20 MG: 20 TABLET ORAL at 10:07

## 2018-11-23 RX ADMIN — HEPARIN SODIUM 5000 UNITS: 5000 INJECTION, SOLUTION INTRAVENOUS; SUBCUTANEOUS at 22:11

## 2018-11-23 NOTE — PROGRESS NOTES
General Surgery End of Shift Nursing Note Shift worked:   7p-7a Summary of shift:    VSS, denies pain. Issues for physician to address:     
 
Number times ambulated in hallway past shift: 0 Number of times OOB to chair past shift: 0 Pt up to bedside commode 4x overnight. Pain Management: 
Current medication: 0 Patient states pain is manageable on current pain medication: YES 
 
GI: 
Current diet:  DIET FULL LIQUID Tolerating current diet: YES Passing flatus: YES Last Bowel Movement: yesterday Appearance: loose Respiratory: 
Incentive Spirometer at bedside: NO 
Patient instructed on use: NO 
 
Patient Safety: 
Falls Score: 4 Bed Alarm On? Yes Sitter? No 
Pt does not have steady gait, he does not consistently use call bell to get up despite education. Bed alarm in place, frequent rounding.   
 
Krystal Nobles RN

## 2018-11-23 NOTE — PROGRESS NOTES
Surgery Patient feeling better Xray shows some improvement Having BMs Rajni liquids Seems to be resolving Agree with present plan Vinny Hdez MD, [address] [facility]

## 2018-11-23 NOTE — PROGRESS NOTES
Neurology Progress Note Patient ID: Nhung Shanks 
[id number] 
81 y.o. 
1945 CC: tremors, gait issues Subjective:  
  
Patient today reports significant improvement of his resting tremors and more mobile. Labs revealed low platelet and hypocalcemia. KUB revealed decrease gaseous distention. Current Facility-Administered Medications Medication Dose Route Frequency  polyethylene glycol (MIRALAX) packet 17 g  17 g Oral BID  docusate sodium (COLACE) capsule 100 mg  100 mg Oral BID  lactulose (CHRONULAC) solution 20 g  30 mL Oral PRN  
 famotidine (PEPCID) tablet 20 mg  20 mg Oral BID  nitroglycerin (NITROBID) 2 % ointment 1 Inch  1 Inch Topical Q6H PRN  
 heparin (porcine) injection 5,000 Units  5,000 Units SubCUTAneous Q8H  
 influenza vaccine 2018-19 (6 mos+)(PF) (FLUARIX QUAD/FLULAVAL QUAD) injection 0.5 mL  0.5 mL IntraMUSCular PRIOR TO DISCHARGE  sodium chloride (NS) flush 5-10 mL  5-10 mL IntraVENous Q8H  
 sodium chloride (NS) flush 5-10 mL  5-10 mL IntraVENous PRN  
 0.9% sodium chloride infusion  125 mL/hr IntraVENous CONTINUOUS  
 acetaminophen (TYLENOL) tablet 650 mg  650 mg Oral Q4H PRN  
 amantadine HCl (SYMMETREL) capsule 100 mg  100 mg Oral TID  aspirin tablet 325 mg  325 mg Oral DAILY  benztropine (COGENTIN) tablet 1 mg  1 mg Oral TID  carbidopa-levodopa (SINEMET)  mg per tablet 2 Tab  2 Tab Oral TID  ondansetron (ZOFRAN) injection 4 mg  4 mg IntraVENous Q4H PRN Review of Systems: 
 
Pertinent items are noted in HPI. Objective:  
 
Patient Vitals for the past 8 hrs: 
 BP Temp Pulse Resp SpO2  
11/23/18 1213 111/68 98.1 °F (36.7 °C) 76 16 97 % 11/23/18 0851 149/86 97.4 °F (36.3 °C) 69 16 91 % No intake/output data recorded. 11/21 1901 - 11/23 0700 In: 4402.9 [P.O.:400; I.V.:4002.9] Out: 1200 [Urine:1200] Lab Review Recent Results (from the past 24 hour(s)) METABOLIC PANEL, BASIC  
 Collection Time: 11/23/18  4:31 AM  
Result Value Ref Range Sodium 138 136 - 145 mmol/L Potassium 3.9 3.5 - 5.1 mmol/L Chloride 108 97 - 108 mmol/L  
 CO2 23 21 - 32 mmol/L Anion gap 7 5 - 15 mmol/L Glucose 80 65 - 100 mg/dL BUN 26 (H) 6 - 20 MG/DL Creatinine 0.95 0.70 - 1.30 MG/DL  
 BUN/Creatinine ratio 27 (H) 12 - 20 GFR est AA >60 >60 ml/min/1.73m2 GFR est non-AA >60 >60 ml/min/1.73m2 Calcium 7.8 (L) 8.5 - 10.1 MG/DL  
CBC W/O DIFF Collection Time: 11/23/18  4:31 AM  
Result Value Ref Range WBC 9.1 4.1 - 11.1 K/uL  
 RBC 3.74 (L) 4.10 - 5.70 M/uL  
 HGB 12.1 12.1 - 17.0 g/dL HCT 34.9 (L) 36.6 - 50.3 % MCV 93.3 80.0 - 99.0 FL  
 MCH 32.4 26.0 - 34.0 PG  
 MCHC 34.7 30.0 - 36.5 g/dL  
 RDW 13.4 11.5 - 14.5 % PLATELET 827 (L) 711 - 400 K/uL MPV 9.6 8.9 - 12.9 FL  
 NRBC 0.0 0  WBC ABSOLUTE NRBC 0.00 0.00 - 0.01 K/uL NEUROLOGICAL EXAM: 
  
Appearance: The patient is well developed, well nourished, provides a coherent history and is in no acute distress. Mental Status: Oriented to time, place and person. Fluent, no aphasia or dysarthria. Mask facies. Monotonous voice but better caliber today. Mood and affect appropriate. Cranial Nerves:   II - XII were intact. Motor:  5/5 strength. Normal bulk and tone. No cogwheel rigidity. Less bradykinesia. Reflexes:   Deep tendon reflexes 2+/4 and symmetrical. Toes downgoing. Sensory:   Normal to cold, pinprick and vibration. Gait:  Able to stand quickly from sitting. Better strides but still less arm swing. Tremor:   No obvious resting tremors today. Cerebellar:  Intact FTN/TAYLOR/HTS. Assessment:  
Parkinson's disease Constipation Plan:  
Neurological examination reveals improvement.  Masked faces, monotonous voice but better caliber, less bradykinesia, no obvious resting upper extremity tremors, less postural instability, better strides and decreased arm swing. No cogwheel rigidity. Findings consistent with Parkinson's disease. Responding to restarting his PD medications. 
  
Advised patient compliance with medication regimen. PD medications need to be taken at the exact time and exact intervals religiously. Patient understood. 
  
If he develops dyskinesias, lower Benztropine dose to 0.5 mg TID. 
  
Reminded the patient that constipation is part of Parkinson's disease and his Parkinson's medication will actually worsen his constipation. Sometimes the limiting benefit of Parkinson's medication is the severity of the constipation. Patient will require bowel regimen to prevent this. Signing off for now. Please reconsult when necessary.  
 
Signed: 
[address] Fonseca MD 
11/23/2018 
1:34 PM

## 2018-11-23 NOTE — PROGRESS NOTES
Problem: Self Care Deficits Care Plan (Adult) Goal: *Acute Goals and Plan of Care (Insert Text) Occupational Therapy Goals Initiated 11/23/2018 1. Patient will perform grooming at the sink with supervision/set-up within 7 day(s). 2.  Patient will perform bathing at the sink with minimal assistance/contact guard assist within 7 day(s). 3.  Patient will perform lower body dressing with maximal assistance within 7 day(s). 4.  Patient will perform toilet transfers with minimal assistance/contact guard assist within 7 day(s). 5.  Patient will perform all aspects of toileting with independence within 7 day(s). 6.  Patient will participate in upper extremity therapeutic exercise/activities with supervision/set-up for 10minutes within 7 day(s). 7.  Patient will utilize energy conservation techniques during functional activities with verbal cues within 7 day(s). Occupational Therapy EVALUATION Patient: Gregg Palmer (31 y.o. male) Date: 11/23/2018 Primary Diagnosis: Constipation Bowel obstruction ([facility] [address] Precautions: fall ASSESSMENT : 
Based on the objective data described below, the patient presents with generalized weakness, decreased endurance, strength, functional mobility, decreased safety, and ADLs. Pt was living at home with family and stated that he was independent with ADLs and ILS and driving prior to his change in meds. Pt was cleared to be seen for therapy and all VSS and pt was supine in bed. He was supervision for bed mobility and noted to have resting hand tremors in BUE. Pt was min assist of 2 to stand and leaning to left and his left UE has intention tremors and pt is left hand dominant. Pt was walked to bathroom with min assist of 2 and once he started to turn to walk to bathroom, he had a large LOB and was min assist of 2 to regain balance.   Pt was min assist of 2 for transfer to toilet and after being told that he needed to call for assist before he stood but he stood with out assist.  Pt was min assist of 2 for standing at the sink to wash hands and continued to have LOB with turning and was given RW for transfers and walking. Pt was min assist of 2 for walking in room and needed assist with moving the RW while he was turning to sit down. Pt was left in chair with bed alarm activated and nursing notified and pt educated to call for assist.  Recommend that pt have further therapy at discharge at In pt rehab or SNf pending his progress and his response to Parkinson meds. Patient will benefit from skilled intervention to address the above impairments. Patients rehabilitation potential is considered to be Good Factors which may influence rehabilitation potential include:  
[x]             None noted []             Mental ability/status []             Medical condition []             Home/family situation and support systems []             Safety awareness []             Pain tolerance/management 
[]             Other: PLAN : 
Recommendations and Planned Interventions: 
[x]               Self Care Training                  []        Therapeutic Activities [x]               Functional Mobility Training    []        Cognitive Retraining 
[x]               Therapeutic Exercises           [x]        Endurance Activities [x]               Balance Training                   []        Neuromuscular Re-Education []               Visual/Perceptual Training     [x]   Home Safety Training 
[x]               Patient Education                 [x]        Family Training/Education []               Other (comment): Frequency/Duration: Patient will be followed by occupational therapy 4 times a week to address goals. Discharge Recommendations: Inpatient Rehab vs [facility] Further Equipment Recommendations for Discharge: tbd SUBJECTIVE:  
Patient stated I was going to boxing before he changed my meds.  OBJECTIVE DATA SUMMARY:  
HISTORY:  
 Past Medical History:  
Diagnosis Date  Mitral valve disorders(424.0) 11/19/2009 4/3/17 followed by PCP no cardiologist  
 Parkinson disease ([facility] [address]  Prostatism 11/19/2009  
 Skin cancer Past Surgical History:  
Procedure Laterality Date  HX APPENDECTOMY  childhood  HX COLONOSCOPY Prior Level of Function/Environment/Context: pt lives at home with family and was independent with ADLs and ILS and driving PTA Occupations in which the patient is/was successful, what are the barriers preventing that success:  
Performance Patterns (routines, roles, habits, and rituals):  
Personal Interests and/or values:  
Expanded or extensive additional review of patient history:  
 
Home Situation Home Environment: Private residence # Steps to Enter: 2 Rails to Enter: No 
One/Two Story Residence: Two story # of Interior Steps: 15 Interior Rails: Right Living Alone: No 
Support Systems: Family member(s) Patient Expects to be Discharged to[de-identified] Private residence Current DME Used/Available at Home: Walker, rolling Tub or Shower Type: Shower Hand dominance: RightEXAMINATION OF PERFORMANCE DEFICITS: 
Cognitive/Behavioral Status: 
Neurologic State: (P) Alert Orientation Level: (P) Oriented X4 Cognition: (P) Impulsive; Follows commands Perception: (P) Appears intact Perseveration: (P) No perseveration noted Safety/Judgement: (P) Awareness of environment; Fall prevention Skin: in good health Edema: none Hearing: Auditory Auditory Impairment: None Vision/Perceptual:   
    
    
    
 intact Range of Motion: 
 
AROM: (P) Within functional limits PROM: (P) Within functional limits Strength: 
 
Strength: (P) Generally decreased, functional 
  
  
  
  
Coordination: 
Coordination: (P) Generally decreased, functional 
Fine Motor Skills-Upper: (P) Left Impaired;Right Impaired Gross Motor Skills-Upper: (P) Left Impaired;Right Impaired Tone & Sensation: Tone: (P) Normal 
Sensation: (P) Intact Balance: 
Sitting: (P) Intact Standing: (P) Impaired; Without support Standing - Static: (P) Poor Standing - Dynamic : (P) Poor Functional Mobility and Transfers for ADLs:Bed Mobility: 
Rolling: (P) Supervision Supine to Sit: (P) Supervision Scooting: (P) Supervision Transfers: 
Sit to Stand: (P) Minimum assistance;Assist x2 Stand to Sit: (P) Minimum assistance;Assist x2 Bed to Chair: (P) Minimum assistance;Assist x2 Bathroom Mobility: (P) Minimum assistance(OF 2) Toilet Transfer : (P) Minimum assistance;Assist x2 ADL Assessment: 
Feeding: (P) Setup Oral Facial Hygiene/Grooming: (P) Setup Bathing: (P) Maximum assistance;Minimum assistance Upper Body Dressing: (P) Minimum assistance;Setup Lower Body Dressing: (P) Maximum assistance;Minimum assistance Cognitive Retraining Safety/Judgement: (P) Awareness of environment; Fall prevention Functional Measure: 
Barthel Index: 
 
Bathin Bladder: 10 Bowels: 10 
Groomin Dressin Feedin Mobility: 10 Stairs: 0 Toilet Use: 5 Transfer (Bed to Chair and Back): 10 Total: 55 Barthel and G-code impairment scale: 
Percentage of impairment CH 
0% CI 
1-19% CJ 
20-39% CK 
40-59% CL 
60-79% CM 
80-99% CN 
100% Barthel Score 0-100 100 99-80 79-60 59-40 20-39 1-19 
 0 Barthel Score 0-20 20 17-19 13-16 9-12 5-8 1-4 0 The Barthel ADL Index: Guidelines 1. The index should be used as a record of what a patient does, not as a record of what a patient could do. 2. The main aim is to establish degree of independence from any help, physical or verbal, however minor and for whatever reason. 3. The need for supervision renders the patient not independent. 4. A patient's performance should be established using the best available evidence.  Asking the patient, friends/relatives and nurses are the usual sources, but direct observation and common sense are also important. However direct testing is not needed. 5. Usually the patient's performance over the preceding 24-48 hours is important, but occasionally longer periods will be relevant. 6. Middle categories imply that the patient supplies over 50 per cent of the effort. 7. Use of aids to be independent is allowed. Kristi Boas., Barthel, D.W. ([id number]). Functional evaluation: the Barthel Index. [address] (14)2. ALBERTO Davila, Justin Tee., [place] Eliazar., [place], [address] [facility] (1999). Measuring the change indisability after inpatient rehabilitation; comparison of the responsiveness of the Barthel Index and Functional [place] Measure. Journal of Neurology, Neurosurgery, and Psychiatry, 66(4), 644-832. MICK Blank, KAROLYN Plascencia, & Gary Jackson M.A. (2004.) Assessment of post-stroke quality of life in cost-effectiveness studies: The usefulness of the Barthel Index and the EuroQoL-5D. [facility], 13, 966-11 G codes: In compliance with CMSs Claims Based Outcome Reporting, the following G-code set was chosen for this patient based on their primary functional limitation being treated: The outcome measure chosen to determine the severity of the functional limitation was the Barthel with a score of 55/100 which was correlated with the impairment scale. ? Self Care:  
  - CURRENT STATUS: CK - 40%-59% impaired, limited or restricted  - GOAL STATUS: CJ - 20%-39% impaired, limited or restricted  - D/C STATUS:  ---------------To be determined--------------- Occupational Therapy Evaluation Charge Determination History Examination Decision-Making MEDIUM Complexity : Expanded review of history including physical, cognitive and psychosocial  history  MEDIUM Complexity : 3-5 performance deficits relating to physical, cognitive , or psychosocial skils that result in activity limitations and / or participation restrictions MEDIUM Complexity : Patient may present with comorbidities that affect occupational performnce. Miniml to moderate modification of tasks or assistance (eg, physical or verbal ) with assesment(s) is necessary to enable patient to complete evaluation Based on the above components, the patient evaluation is determined to be of the following complexity level: MEDIUM Pain: 
Pain Scale 1: Numeric (0 - 10) Pain Intensity 1: 0 Activity Tolerance:  
good Please refer to the flowsheet for vital signs taken during this treatment. After treatment:  
[x] Patient left in no apparent distress sitting up in chair 
[] Patient left in no apparent distress in bed 
[x] Call bell left within reach 
[] Nursing notified 
[] Caregiver present [x] Bed alarm activated COMMUNICATION/EDUCATION:  
The patients plan of care was discussed with: Physical Therapist and Registered Nurse. [x] Home safety education was provided and the patient/caregiver indicated understanding. [x] Patient/family have participated as able in goal setting and plan of care. [x] Patient/family agree to work toward stated goals and plan of care. [] Patient understands intent and goals of therapy, but is neutral about his/her participation. [] Patient is unable to participate in goal setting and plan of care. This patients plan of care is appropriate for delegation to [facility]. Thank you for this referral. 
Radha Henry OT Time Calculation: 27 mins

## 2018-11-23 NOTE — PROGRESS NOTES
Hospitalist Progress Note NAME: Elvi Pritchard :  1945 MRN:  [id number] Assessment / Plan: 
Small bowel obstruction with severe constipation and dilated stomach/esophagus in setting of Parkinson's disease: 
- CT A/P with small bowel obstruction possibly secondary to marked fecal impaction of the colon. Large amount of stool throughout the colon and rectum. Dilated fluid-filled esophagus and dilated stomach. Hepatic cysts. Spondylosis with L2, L3 and L4 compression fractures. - repeat KUB this morning with decreased gaseous distention. - con't colace and miralax - GI and general surgery consults appreciated 
- con't liquid diet for now - d/c IV fluids later today Parkinson's disease with increasing motor dysfunction: diagnosed in , felt he was doing fairly well until this last week 
- no recent brain imaging 
- neurology recommending that Pt con't home regimen (not compliant) 
- con't amantadine, cogentin and sinemet  
- PT/OT evals hopefully today Compression fracture of L2, L3 and L4: denies pain - tylenol if needed 
- PT/OT consulted Hyponatremia, mild: resolving Mitral valve prolapse Feel elevated Cr not likely MAINOR, probably baseline CKD2 
  
Code Status: Full code Surrogate Decision Maker: wife Dheeraj Buys [phone]  
DVT Prophylaxis: heparin  
  
Baseline: exercise classes 3x per week, functional  
 
Subjective: Chief Complaint / Reason for Physician Visit \"I'm doing better\". Has 1 additional stool. Discussed with RN events overnight. Review of Systems: 
Symptom Y/N Comments  Symptom Y/N Comments Fever/Chills n   Chest Pain n   
Poor Appetite n   Edema n   
Cough n   Abdominal Pain n   
Sputum n   Joint Pain SOB/LOPEZ n   Pruritis/Rash Nausea/vomit    Tolerating PT/OT Diarrhea    Tolerating Diet Constipation    Other Could NOT obtain due to:   
 
Objective: VITALS:  
Last 24hrs VS reviewed since prior progress note. Most recent are: Patient Vitals for the past 24 hrs: 
 Temp Pulse Resp BP SpO2  
11/23/18 0851 97.4 °F (36.3 °C) 69 16 149/86 91 % 11/23/18 0432 97.7 °F (36.5 °C) 72 16 138/84 94 % 11/22/18 2317 98.4 °F (36.9 °C) 74 18 123/75 94 % 11/22/18 2007 98.7 °F (37.1 °C) 73 20 123/71 95 % 11/22/18 1427 98.2 °F (36.8 °C) 71 19 139/78 93 % 11/22/18 1226 98.6 °F (37 °C) 71 20 134/72 90 % Intake/Output Summary (Last 24 hours) at 11/23/2018 1016 Last data filed at 11/23/2018 2356 Gross per 24 hour Intake 2887.91 ml Output 1200 ml Net 1687.91 ml PHYSICAL EXAM: 
General: WD, WN. Alert, cooperative, no acute distress   
EENT:  EOMI. Anicteric sclerae. MMM Resp:  CTA bilaterally, no wheezing or rales. No accessory muscle use CV:  Regular rhythm,  No edema GI:  Soft, mildly distended, Non tender.  Diminished bowel sounds. Neurologic:  Alert and oriented X 3, normal speech, Psych:   Limited insight. Not anxious nor agitated Skin:  No rashes. No jaundice Reviewed most current lab test results and cultures  YES Reviewed most current radiology test results   YES Review and summation of old records today    NO Reviewed patient's current orders and MAR    YES 
PMH/ reviewed - no change compared to H&P 
________________________________________________________________________ Care Plan discussed with: 
  Comments Patient x Family RN x Care Manager Consultant Multidiciplinary team rounds were held today with , nursing, pharmacist and clinical coordinator. Patient's plan of care was discussed; medications were reviewed and discharge planning was addressed. ________________________________________________________________________ Total NON critical care TIME:  25 Minutes Total CRITICAL CARE TIME Spent:   Minutes non procedure based Comments >50% of visit spent in counseling and coordination of care x ________________________________________________________________________ Le Wong MD  
 
Procedures: see electronic medical records for all procedures/Xrays and details which were not copied into this note but were reviewed prior to creation of Plan. LABS: 
I reviewed today's most current labs and imaging studies. Pertinent labs include: 
Recent Labs  
  11/23/18 0431 11/22/18 0624 11/21/18 
1339 WBC 9.1 11.0 10.0 HGB 12.1 13.7 15.3 HCT 34.9* 40.3 46.1 * 153 164 Recent Labs  
  11/23/18 0431 11/22/18 0624 11/21/18 
1339  133* 131*  
K 3.9 4.0 4.1  101 98 CO2 23 23 23 GLU 80 121* 178* BUN 26* 35* 24* CREA 0.95 1.28 1.42* CA 7.8* 8.5 9.5 MG  --   --  2.0 ALB  --  3.7 4.6 TBILI  --  1.2* 1.2* SGOT  --  47* 36 ALT  --  14 16 Signed: Le Wong MD

## 2018-11-23 NOTE — PROGRESS NOTES
[facility] 
[address] [place], [address] 
[phone] GI PROGRESS NOTE 
 
 
NAME:   Temi Wyman :    1945 MRN:    [id number] Assessment/Plan Small bowel obstruction- much improved after enemas and BM. No nausea or vomiting May have clears today and continue on bowel regimen Patient Active Problem List  
Diagnosis Code  MVP (mitral valve prolapse) I34.1  Prostatism N40.0  Basal cell carcinoma of nose C44.311  
 Annual physical exam Z00.00  Compression fracture of lumbar spine, non-traumatic, sequela M48.56XS  Age-related osteoporosis with current pathological fracture M80.00XA  Parkinson's disease (tremor, stiffness, slow motion, unstable posture) ([address] G20  
 Family history of colon cancer requiring screening colonoscopy Z80.0  Puncture wound to foot S91.339A  Dependent edema R60.9  Constipation K59.00  Bowel obstruction ([address] J14.265 Subjective:  
 
eTmi Wyman is a 68 y.o.  male who feels better. No abdominal pain, nausea or vomiting. Had bm after soap suds enemas. Review of Systems Constitutional: negative fever, negative chills, negative weight loss Eyes:   negative visual changes ENT:   negative sore throat, tongue or lip swelling Respiratory:  negative cough, negative dyspnea Cards:  negative for chest pain, palpitations, lower extremity edema GI:   See HPI 
:  negative for frequency, dysuria Integument:  negative for rash and pruritus Heme:  negative for easy bruising and gum/nose bleeding Musculoskel: negative for myalgias,  back pain and muscle weakness Neuro: negative for headaches, dizziness, vertigo Psych:  negative for feelings of anxiety, depression Objective: VITALS:  
Last 24hrs VS reviewed since prior hospitalist progress note. Most recent are: 
Visit Vitals /86 (BP 1 Location: Right arm, BP Patient Position: At rest) Pulse 69  
 Temp 97.4 °F (36.3 °C) Resp 16 Ht 6' 1\" (1.854 m) Wt 70.8 kg (156 lb) SpO2 91% BMI 20.58 kg/m² Intake/Output Summary (Last 24 hours) at 11/23/2018 3799 Last data filed at 11/23/2018 8999 Gross per 24 hour Intake 2887.91 ml Output 1200 ml Net 1687.91 ml PHYSICAL EXAM: 
General   well developed, well nourished, appears stated age, in no acute distress EENT  Normocephalic, Atraumatic, PERRLA, EOMI, sclera clear, nares clear, pharynx normal 
Neck   Supple without nodes or mass. No thyromegaly or bruit Respiratory   Clear To Auscultation bilaterally - no wheezes, rales, rhonchi, or crackles Cardiology  Regular Rate and Rythmn  - no murmurs, rubs or gallops Abdominal  Soft, non-tender, non-distended, positive bowel sounds, no hepatosplenomegaly, no palpable mass Extremities  No clubbing, cyanosis, or edema. Pulses intact. Back  No spinal or muscle pain. No CVAT. Skin  Normal skin turgor. No rashes or skin ulcers noted Neurological  No focal neurological deficits noted Psychological  Oriented x 3. Normal affect. Lab Data Recent Results (from the past 12 hour(s)) METABOLIC PANEL, BASIC Collection Time: 11/23/18  4:31 AM  
Result Value Ref Range Sodium 138 136 - 145 mmol/L Potassium 3.9 3.5 - 5.1 mmol/L Chloride 108 97 - 108 mmol/L  
 CO2 23 21 - 32 mmol/L Anion gap 7 5 - 15 mmol/L Glucose 80 65 - 100 mg/dL BUN 26 (H) 6 - 20 MG/DL Creatinine 0.95 0.70 - 1.30 MG/DL  
 BUN/Creatinine ratio 27 (H) 12 - 20 GFR est AA >60 >60 ml/min/1.73m2 GFR est non-AA >60 >60 ml/min/1.73m2 Calcium 7.8 (L) 8.5 - 10.1 MG/DL  
CBC W/O DIFF Collection Time: 11/23/18  4:31 AM  
Result Value Ref Range WBC 9.1 4.1 - 11.1 K/uL  
 RBC 3.74 (L) 4.10 - 5.70 M/uL  
 HGB 12.1 12.1 - 17.0 g/dL HCT 34.9 (L) 36.6 - 50.3 % MCV 93.3 80.0 - 99.0 FL  
 MCH 32.4 26.0 - 34.0 PG  
 MCHC 34.7 30.0 - 36.5 g/dL  
 RDW 13.4 11.5 - 14.5 % PLATELET 213 (L) 116 - 400 K/uL MPV 9.6 8.9 - 12.9 FL  
 NRBC 0.0 0  WBC ABSOLUTE NRBC 0.00 0.00 - 0.01 K/uL Medications: Reviewed PMH/SH reviewed - no change compared to H&P Attending Physician: Angelika Torres MD  
Date/Time:  11/23/2018

## 2018-11-23 NOTE — PROGRESS NOTES
Physical Therapy Goals Initiated 11/23/2018 1. Patient will move from supine to sit and sit to supine , scoot up and down and roll side to side in bed with supervision/set-up within 7 day(s). 2.  Patient will transfer from bed to chair and chair to bed with supervision/set-up using the least restrictive device within 7 day(s). 3.  Patient will perform sit to stand with supervision/set-up within 7 day(s). 4.  Patient will ambulate with supervision/set-up for 300 feet with the least restrictive device within 7 day(s). 5.  Patient will ascend/descend 12 stairs with 1 handrail(s) with minimal assistance/contact guard assist within 7 day(s). physical Therapy EVALUATION Patient: Belinda Clinton (61 y.o. male) Date: 11/23/2018 Primary Diagnosis: Constipation Bowel obstruction ([facility] [address] Precautions: falls ASSESSMENT : 
Based on the objective data described below, the patient presents with generalized weakness and impaired functional mobility and balance. Patient also demonstrates impaired coordination and tone. Patient with h/o Parkinson's disease with reports of worsening balance since last medication adjustment. Patient able to transition to EOB with SBA but requires min A of 2 for safe transfers and ambulation. Gait is very unsteady demonstrating fluctuating step length and lev; festinating and shuffled gait, and poor standing balance with constant L lateral lean. Patient demonstrating 2 major losses of balance requiring mod A of 2 to regain balance. Recommend use of RW at this time and assistance of 2 staff members. Patient reports complete independence at baseline, but admits to declining balance and one fall following medication adjustment. Patient currently well below his functional baseline and is a large fall risk. Patient is impulsive with mobility and appears to lack insight into his current mobility deficits which further increases his risk of falls. Recommend neurology consult to address worsening Parkinson's symptoms. Depending on  medical course and progress in therapy recommend inpatient rehab vs SNF following discharge Patient will benefit from skilled intervention to address the above impairments. Patients rehabilitation potential is considered to be Good Factors which may influence rehabilitation potential include:  
[]         None noted 
[]         Mental ability/status [x]         Medical condition 
[]         Home/family situation and support systems 
[]         Safety awareness 
[]         Pain tolerance/management 
[]         Other: PLAN : 
Recommendations and Planned Interventions: 
[x]           Bed Mobility Training             []    Neuromuscular Re-Education 
[x]           Transfer Training                   []    Orthotic/Prosthetic Training 
[x]           Gait Training                         []    Modalities [x]           Therapeutic Exercises           []    Edema Management/Control 
[x]           Therapeutic Activities            [x]    Patient and Family Training/Education 
[]           Other (comment): Frequency/Duration: Patient will be followed by physical therapy  5 times a week to address goals. Discharge Recommendations: To Be Determined- inpatient rehab vs SNF Further Equipment Recommendations for Discharge: TBD by rehab SUBJECTIVE:  
Patient stated I'll be okay. I can catch myself if I start to fall.  OBJECTIVE DATA SUMMARY:  
HISTORY:   
Past Medical History:  
Diagnosis Date  Mitral valve disorders(424.0) 11/19/2009 4/3/17 followed by PCP no cardiologist  
 Parkinson disease ([facility] [address]  Prostatism 11/19/2009  
 Skin cancer Past Surgical History:  
Procedure Laterality Date  HX APPENDECTOMY  childhood  HX COLONOSCOPY Prior Level of Function/Home Situation: patient reports complete independence with all mobility and ADLs; reports worsening balance following parkinson's medication change with subsequent recent fall Personal factors and/or comorbidities impacting plan of care: impulsive and lacks insight into deficits Home Situation Home Environment: Private residence # Steps to Enter: 2 Rails to Enter: No 
One/Two Story Residence: Two story # of Interior Steps: 15 Interior Rails: Right Living Alone: No 
Support Systems: Family member(s) Patient Expects to be Discharged to[de-identified] Private residence Current DME Used/Available at Home: Walker, rolling Tub or Shower Type: Shower EXAMINATION/PRESENTATION/DECISION MAKING: Critical Behavior: 
Neurologic State: Alert Orientation Level: Oriented X4 Cognition: Impulsive, Follows commands Safety/Judgement: Awareness of environment, Fall prevention Hearing: Auditory Auditory Impairment: None Range Of Motion: 
AROM: Generally decreased, functional 
  
  
  
PROM: Generally decreased, functional 
  
  
  
Strength:   
Strength: Generally decreased, functional 
  
  
  
  
  
  
Tone & Sensation:  
Tone: Normal 
  
  
  
  
Sensation: Intact Coordination: 
Coordination: Generally decreased, functional 
 
  
Functional Mobility: 
Bed Mobility: 
Rolling: Supervision Supine to Sit: Supervision Scooting: Supervision Transfers: 
Sit to Stand: Minimum assistance;Assist x2 Stand to Sit: Minimum assistance;Assist x2 Bed to Chair: Minimum assistance;Assist x2 Balance:  
Sitting: Intact Standing: Impaired; Without support Standing - Static: Poor Standing - Dynamic : Poor Ambulation/Gait Training: 
Distance (ft): 200 Feet (ft) Assistive Device: Gait belt;Walker, rolling Ambulation - Level of Assistance: Minimal assistance;Assist x2 Gait Abnormalities: Decreased step clearance; Festinating gait; Shuffling gait; Steppage gait Base of Support: Narrowed; Shift to left Speed/Kelli: Fluctuations Step Length: (fluctuating) Gait is very unsteady demonstrating constant L lateral lean and short shuffled steps; fluctuating step length and lev noted with hesitation and increased festinating gait when crossing thresholds or crossing color changes on the floor Functional Measure: 
 
Elder Mobility Scale 7/20 EMS and G-code impairment scale: 
Percentage of impairment CH 
0% CI 
1-19% CJ 
20-39% CK 
40-59% CL 
60-79% CM 
80-99% CN 
100% EMS Score 0-20 20 17-19 13-16 9-12 5-8 1-4 0 Scores under 10  generally these patients are dependent in mobility maneuvers; require help with 
basic ADL, such as transfers, toileting and dressing. Scores between 10  13  generally these patients are borderline in terms of safe mobility and 
independence in ADL i.e. they require some help with some mobility maneuvers. Scores over 14  Generally these patients are able to perform mobility maneuvers alone and safely 
and are independent in basic ADL. G codes: In compliance with CMSs Claims Based Outcome Reporting, the following G-code set was chosen for this patient based on their primary functional limitation being treated: The outcome measure chosen to determine the severity of the functional limitation was the Elderly Mobility scale with a score of 7/20 which was correlated with the impairment scale. ? Mobility - Walking and Moving Around:  
  - CURRENT STATUS: CL - 60%-79% impaired, limited or restricted  - GOAL STATUS: CJ - 20%-39% impaired, limited or restricted  - D/C STATUS:  ---------------To be determined---------------  
  
 
Pain: 
Pain Scale 1: Numeric (0 - 10) Pain Intensity 1: 0 After treatment:  
[x]         Patient left in no apparent distress sitting up in chair 
[]         Patient left in no apparent distress in bed 
[x]         Call bell left within reach [x]         Nursing notified 
[x]         Caregiver present [x]         Bed alarm activated COMMUNICATION/EDUCATION:  
The patients plan of care was discussed with: Occupational Therapist, Registered Nurse and Rehabilitation Attendant. [x]         Fall prevention education was provided and the patient/caregiver indicated understanding. [x]         Patient/family have participated as able in goal setting and plan of care. []         Patient/family agree to work toward stated goals and plan of care. [x]         Patient understands intent and goals of therapy, but is neutral about his/her participation. []         Patient is unable to participate in goal setting and plan of care.  
 
Thank you for this referral. 
Oral Skey, PT

## 2018-11-23 NOTE — PROGRESS NOTES
Initial Nutrition Assessment: 
 
INTERVENTIONS/RECOMMENDATIONS:  
· Meals/Snacks: General/healthful diet: Advance diet as tolerated · Supplements: Commercial supplement: Ensure clear TID ASSESSMENT:  
Patient medically noted for small bowel obstruction and constipation. PMH for Parkinson's. Currently tolerating a clear liquid diet. No nausea/vomiting per patient. Complains of feeling hungry. No recent weight loss; states he lost weight 3-4 years ago but has been fairly stable recently. Does not drink supplements at home. Agreeable to trying ensure clear while on clear liquids. Menu at bedside and explained current diet and menu. Encouraged intake of meals. Received consult from admitting MD for diet education; unclear what diet education this patient needs but will continue to follow and provide education as needed. Diet Order: Clear liquids 
% Eaten:   
Patient Vitals for the past 72 hrs: 
 % Diet Eaten 11/22/18 0900 0 % Pertinent Medications: [x]Reviewed []Other: Sinemet, Colace, famotidine, Miralax, PRN lactulose Pertinent Labs: [x]Reviewed []Other: 
Food Allergies: [x]None []Other Last BM: 11/22  [x]Active     []Hyperactive  []Hypoactive       [] Absent BS Skin:    [x] Intact   [] Incision  [] Breakdown: [] Edema []Other: Anthropometrics:  
Height: 6' 1\" (185.4 cm) Weight: 70.8 kg (156 lb) IBW (%IBW):   ( ) UBW (%UBW):   (  %) Last Weight Metrics: 
Weight Loss Metrics 11/21/2018 3/26/2018 2/7/2018 12/8/2017 10/10/2017 4/5/2017 4/3/2017 Today's Wt 156 lb 156 lb 156 lb 3.2 oz 153 lb 155 lb - 160 lb BMI 20.58 kg/m2 21.16 kg/m2 21.18 kg/m2 20.75 kg/m2 - 21.7 kg/m2 -  
 
 
BMI: Body mass index is 20.58 kg/m². This BMI is indicative of: 
 []Underweight    [x]Normal    []Overweight    [] Obesity   [] Extreme Obesity (BMI>40) Estimated Nutrition Needs (Based on):  
1962 Kcals/day(BMR (860 1631) x 1. 3AF) , 71 g(1.0 g/kg bw) Protein Carbohydrate: At Least 130 g/day  Fluids: 1950 mL/day (1ml/kcal) Pt expected to meet estimated nutrient needs: [x]Yes []No 
 
NUTRITION DIAGNOSES:  
Problem:  Altered GI function Etiology: related to SBO, constipation Signs/Symptoms: as evidenced by imaging, clear liquids NUTRITION INTERVENTIONS: 
Meals/Snacks: General/healthful diet   Supplements: Commercial supplement GOAL:  
PO >70% of meals/supplements next 2-4 days LEARNING NEEDS (Diet, Food/Nutrient-Drug Interaction):  
 [x] None Identified 
 [] Identified and Education Provided/Documented 
 [] Identified and Pt declined/was not appropriate Cultural, Alevism, OR Ethnic Dietary Needs:  
 [x] None Identified 
 [] Identified and Addressed 
 
 [x] Interdisciplinary Care Plan Reviewed/Documented  
 [x] Discharge Planning: Regular diet MONITORING /EVALUATION:  
Food/Nutrient Intake Outcomes: Total energy intake Physical Signs/Symptoms Outcomes: Weight/weight change, GI profile NUTRITION RISK:  
 [x] High              [] Moderate           []  Low  []  Minimal/Uncompromised PT SEEN FOR:  
 [x]  MD Consult: []Calorie Count []Diabetic Diet Education [x]Diet Education []Electrolyte Management []General Nutrition Management and Supplements []Management of Tube Feeding []TPN Recommendations [x]  RN Referral:  [x]MST score >=2 
   []Enteral/Parenteral Nutrition PTA []Pregnant: Gestational DM or Multigestation 
   []Pressure Ulcer/Wound Care needs 
     
[]  Low BMI 
[]  ERA Valdovinos Pager [phone] Weekend Pager [phone]

## 2018-11-23 NOTE — PROGRESS NOTES
Reason for Admission:   Constipation and bowel obstruction RRAT Score:    12 Plan for utilizing home health: pt would like to wait to see how he does with PT again before making a decision upon d/c Likelihood of Readmission:  Low Transition of Care Plan:                   
CM made room visit with pt who was alert and oriented. Pt confirmed demographics, emergency contact, and insurance. Pt stated his listed PCP has recently retired and he has not seen a new provider in the office at this time but plans on it. Pt stated he uses [facility] pharmacy on [address] and is able to afford his medication with insurance and takes RX as prescribed. Pt stated he and his wife reside in a two story home with about 12 steps between the two floors and about 2 entry steps. Pt stated that before admission he was independent with ADLs and driving. Pt disclosed the only DME he has is a walker and has no history with HH, SNF, or Rehab. Pt disclosed he does have an advanced directive. CM discussed possible d/c needs with pt. Pt stated he wanted to go home but also wanted to do what was best. CM let pt know of PT's thoughts thus far about going to a rehab facility but that would depend on the progress the pt made with PT while in the hospital. Pt would like to think about the option of going home versus going to a rehab facility. CM let pt know that if he did decide to go home versus rehab then to consider having [place]. Pt stated he will consider the options and see how he does with therapy in hospital. 
 
CM will continue to follow. Care Management Interventions PCP Verified by CM: Yes Mode of Transport at Discharge: Other (see comment)(family to transport home ) Transition of Care Consult (CM Consult): Discharge Planning Discharge Durable Medical Equipment: No 
Physical Therapy Consult: Yes Occupational Therapy Consult:  Yes 
 Speech Therapy Consult: No 
Current Support Network: Own Home, Lives with Spouse Confirm Follow Up Transport: Family Plan discussed with Pt/Family/Caregiver: Yes [place] [address]

## 2018-11-24 ENCOUNTER — APPOINTMENT (OUTPATIENT)
Dept: GENERAL RADIOLOGY | Age: 73
DRG: 389 | End: 2018-11-24
Attending: SPECIALIST
Payer: MEDICARE

## 2018-11-24 LAB
ANION GAP SERPL CALC-SCNC: 9 MMOL/L (ref 5–15)
BUN SERPL-MCNC: 17 MG/DL (ref 6–20)
BUN/CREAT SERPL: 18 (ref 12–20)
CALCIUM SERPL-MCNC: 8.1 MG/DL (ref 8.5–10.1)
CHLORIDE SERPL-SCNC: 110 MMOL/L (ref 97–108)
CO2 SERPL-SCNC: 23 MMOL/L (ref 21–32)
CREAT SERPL-MCNC: 0.92 MG/DL (ref 0.7–1.3)
ERYTHROCYTE [DISTWIDTH] IN BLOOD BY AUTOMATED COUNT: 13.3 % (ref 11.5–14.5)
GLUCOSE SERPL-MCNC: 75 MG/DL (ref 65–100)
HCT VFR BLD AUTO: 37.2 % (ref 36.6–50.3)
HGB BLD-MCNC: 13 G/DL (ref 12.1–17)
MCH RBC QN AUTO: 32.5 PG (ref 26–34)
MCHC RBC AUTO-ENTMCNC: 34.9 G/DL (ref 30–36.5)
MCV RBC AUTO: 93 FL (ref 80–99)
NRBC # BLD: 0 K/UL (ref 0–0.01)
NRBC BLD-RTO: 0 PER 100 WBC
PLATELET # BLD AUTO: 135 K/UL (ref 150–400)
PMV BLD AUTO: 9.8 FL (ref 8.9–12.9)
POTASSIUM SERPL-SCNC: 3.8 MMOL/L (ref 3.5–5.1)
RBC # BLD AUTO: 4 M/UL (ref 4.1–5.7)
SODIUM SERPL-SCNC: 142 MMOL/L (ref 136–145)
WBC # BLD AUTO: 6.7 K/UL (ref 4.1–11.1)

## 2018-11-24 PROCEDURE — 85027 COMPLETE CBC AUTOMATED: CPT

## 2018-11-24 PROCEDURE — 74011250636 HC RX REV CODE- 250/636: Performed by: INTERNAL MEDICINE

## 2018-11-24 PROCEDURE — 74011000250 HC RX REV CODE- 250: Performed by: INTERNAL MEDICINE

## 2018-11-24 PROCEDURE — 36415 COLL VENOUS BLD VENIPUNCTURE: CPT

## 2018-11-24 PROCEDURE — 65660000000 HC RM CCU STEPDOWN

## 2018-11-24 PROCEDURE — 74011250637 HC RX REV CODE- 250/637: Performed by: INTERNAL MEDICINE

## 2018-11-24 PROCEDURE — 80048 BASIC METABOLIC PNL TOTAL CA: CPT

## 2018-11-24 PROCEDURE — 99231 SBSQ HOSP IP/OBS SF/LOW 25: CPT | Performed by: FAMILY MEDICINE

## 2018-11-24 PROCEDURE — 74019 RADEX ABDOMEN 2 VIEWS: CPT

## 2018-11-24 RX ADMIN — Medication 10 ML: at 06:07

## 2018-11-24 RX ADMIN — AMANTADINE HYDROCHLORIDE 100 MG: 100 CAPSULE ORAL at 22:16

## 2018-11-24 RX ADMIN — Medication 10 ML: at 13:15

## 2018-11-24 RX ADMIN — FAMOTIDINE 20 MG: 20 TABLET ORAL at 08:31

## 2018-11-24 RX ADMIN — HEPARIN SODIUM 5000 UNITS: 5000 INJECTION, SOLUTION INTRAVENOUS; SUBCUTANEOUS at 14:04

## 2018-11-24 RX ADMIN — POLYETHYLENE GLYCOL 3350 17 G: 17 POWDER, FOR SOLUTION ORAL at 18:00

## 2018-11-24 RX ADMIN — CARBIDOPA AND LEVODOPA 2 TABLET: 25; 100 TABLET ORAL at 17:44

## 2018-11-24 RX ADMIN — ASPIRIN 325 MG: 325 TABLET ORAL at 08:31

## 2018-11-24 RX ADMIN — HEPARIN SODIUM 5000 UNITS: 5000 INJECTION, SOLUTION INTRAVENOUS; SUBCUTANEOUS at 22:16

## 2018-11-24 RX ADMIN — CARBIDOPA AND LEVODOPA 2 TABLET: 25; 100 TABLET ORAL at 22:16

## 2018-11-24 RX ADMIN — AMANTADINE HYDROCHLORIDE 100 MG: 100 CAPSULE ORAL at 17:44

## 2018-11-24 RX ADMIN — Medication 10 ML: at 22:16

## 2018-11-24 RX ADMIN — HEPARIN SODIUM 5000 UNITS: 5000 INJECTION, SOLUTION INTRAVENOUS; SUBCUTANEOUS at 06:06

## 2018-11-24 RX ADMIN — BENZTROPINE MESYLATE 1 MG: 1 TABLET ORAL at 17:43

## 2018-11-24 RX ADMIN — BENZTROPINE MESYLATE 1 MG: 1 TABLET ORAL at 08:31

## 2018-11-24 RX ADMIN — DOCUSATE SODIUM 100 MG: 100 CAPSULE, LIQUID FILLED ORAL at 08:31

## 2018-11-24 RX ADMIN — DOCUSATE SODIUM 100 MG: 100 CAPSULE, LIQUID FILLED ORAL at 17:43

## 2018-11-24 RX ADMIN — POLYETHYLENE GLYCOL 3350 17 G: 17 POWDER, FOR SOLUTION ORAL at 08:31

## 2018-11-24 RX ADMIN — AMANTADINE HYDROCHLORIDE 100 MG: 100 CAPSULE ORAL at 08:31

## 2018-11-24 RX ADMIN — CARBIDOPA AND LEVODOPA 2 TABLET: 25; 100 TABLET ORAL at 08:31

## 2018-11-24 RX ADMIN — BENZTROPINE MESYLATE 1 MG: 1 TABLET ORAL at 22:15

## 2018-11-24 RX ADMIN — FAMOTIDINE 20 MG: 20 TABLET ORAL at 17:43

## 2018-11-24 NOTE — PROGRESS NOTES
General Surgery End of Shift Nursing Note Bedside shift change report given to Ritika RN (oncoming nurse) by Opal Pereyra RN (offgoing nurse). Report included the following information SBAR, Kardex, Intake/Output, MAR and Recent Results. Shift worked:   7a to 3p Summary of shift:    Up to chair, diet advanced today and passing flatus Issues for physician to address:   none Number times ambulated in hallway past shift: in room Number of times OOB to chair past shift: 2 Pain Management: 
Current medication: none Patient states pain is manageable on current pain medication: YES 
 
GI: 
 
Current diet:  DIET NUTRITIONAL SUPPLEMENTS No; Breakfast, Lunch, Dinner; Ensure Clear DIET GI LITE (POST SURGICAL) Tolerating current diet: YES Passing flatus: YES Last Bowel Movement: 11/22/18 Appearance: watery Respiratory: 
 
Incentive Spirometer at bedside: YES Patient instructed on use: YES Patient Safety: 
 
Falls Score: 4 Bed Alarm On? Yes Sitter?  No 
 
Latosha Capps RN

## 2018-11-24 NOTE — PROGRESS NOTES
Surgery Rajni full liquids and has been advanced to [employer] Abd soft+BS Resolved/resolving obstruction Cont present bowel regimen Will sign off, call if further surgical eval desired. Alex Charles 
[id number] [facility]

## 2018-11-24 NOTE — PROGRESS NOTES
F/U for bowel obstruction due to stool S: Mr. Kraig Garcia was seen by me today during rounds. At this time, he is resting +comfortably. + eating, + stooling some. The patient has no other new complaints today. Please see admission consult for details of ROS; there are + changes today. O: Blood pressure 126/62, pulse 67, temperature 98.1 °F (36.7 °C), resp. rate 18, height 6' 1\" (1.854 m), weight 70.8 kg (156 lb), SpO2 94 %. Gen: Patient is in no acute distress. Flat affect stiffl, chronically ill appearing. There is no jaundice. Lungs: Clear to auscultation bilaterally . Heart:+RRR. Abd: Soft, nontender, non-distended, bowel sounds present. Extremities: Warm. Cross sectional imaging:  None new A: Active Problems: 
  Constipation (11/21/2018) Bowel obstruction ([address] (11/22/2018) Comment: This is impressive to have enough stool in colon to back up the bowels and gi tract all the way to the esophagus; he is better P:  Continue diet, miralax and stool softeners And lactulose Will follow while here Victoria Mercado MD 
2:37 PM 
11/24/2018

## 2018-11-24 NOTE — PROGRESS NOTES
Progress Note NAME: Марина Lara :  1945 MRN:  [id number] Assessment / Plan: 
Small bowel obstruction with severe constipation and dilated stomach/esophagus in setting of Parkinson's disease: 
- CT A/P with small bowel obstruction possibly secondary to marked fecal impaction of the colon. Large amount of stool throughout the colon and rectum. Dilated fluid-filled esophagus and dilated stomach. Hepatic cysts. Spondylosis with L2, L3 and L4 compression fractures. - repeat KUB11/ with decreased gaseous distention. - con't colace and miralax - GI and general surgery consults appreciated 
-advance to GI lite diet Parkinson's disease with increasing motor dysfunction: diagnosed in , felt he was doing fairly well until this last week 
- no recent brain imaging 
- neurology recommending that Pt con't home regimen (not compliant) 
- con't amantadine, cogentin and sinemet  
- PT/OT evals hopefully today Compression fracture of L2, L3 and L4: denies pain - tylenol if needed 
- PT/OT consulted Hyponatremia, mild: resolving Mitral valve prolapse Feel elevated Cr not likely MAINOR, probably baseline CKD2 
  
Code Status: Full code Surrogate Decision Maker: wife Gilmar Bishop [phone]  
DVT Prophylaxis: heparin  
  
Baseline: exercise classes 3x per week, functional  
 
Subjective: Chief Complaint / Reason for Physician Visit \"I'm doing better\". Has 1 additional stool. Discussed with RN events overnight. Review of Systems: 
Symptom Y/N Comments  Symptom Y/N Comments Fever/Chills n   Chest Pain n   
Poor Appetite n   Edema n   
Cough n   Abdominal Pain n   
Sputum n   Joint Pain SOB/LOPEZ n   Pruritis/Rash Nausea/vomit    Tolerating PT/OT Diarrhea    Tolerating Diet Constipation    Other Could NOT obtain due to:   
 
Objective: VITALS:  
Last 24hrs VS reviewed since prior progress note. Most recent are: 
Patient Vitals for the past 24 hrs: Temp Pulse Resp BP SpO2  
11/24/18 0825 97.4 °F (36.3 °C) 71 18 (!) 136/98 96 % 11/24/18 0327 98 °F (36.7 °C) 64 18 123/70 95 % 11/23/18 2346 97.9 °F (36.6 °C) 64 18 124/66 94 % 11/23/18 2020 97.3 °F (36.3 °C) 67 18 124/60 98 % 11/23/18 1556 97.4 °F (36.3 °C) 88 18 167/88 96 % 11/23/18 1213 98.1 °F (36.7 °C) 76 16 111/68 97 % Intake/Output Summary (Last 24 hours) at 11/24/2018 1040 Last data filed at 11/24/2018 6010 Gross per 24 hour Intake 1240 ml Output 1900 ml Net -660 ml PHYSICAL EXAM: 
General: WD, WN. Alert, cooperative, no acute distress   
EENT:  EOMI. Anicteric sclerae. MMM Resp:  CTA bilaterally, no wheezing or rales. No accessory muscle use CV:  Regular rhythm,  No edema GI:  Soft, mildly distended, Non tender.  Diminished bowel sounds. Neurologic:  Alert and oriented X 3, normal speech, Psych:   Limited insight. Not anxious nor agitated Skin:  No rashes. No jaundice Reviewed most current lab test results and cultures  YES Reviewed most current radiology test results   YES Review and summation of old records today    NO Reviewed patient's current orders and MAR    YES 
PMH/SH reviewed - no change compared to H&P 
________________________________________________________________________ Care Plan discussed with: 
  Comments Patient x Family RN x Care Manager Consultant Multidiciplinary team rounds were held today with , nursing, pharmacist and clinical coordinator. Patient's plan of care was discussed; medications were reviewed and discharge planning was addressed. ________________________________________________________________________ Total NON critical care TIME:  25 Minutes Total CRITICAL CARE TIME Spent:   Minutes non procedure based Comments >50% of visit spent in counseling and coordination of care x   
________________________________________________________________________ Valeria Jeffery MD  
 
Procedures: see electronic medical records for all procedures/Xrays and details which were not copied into this note but were reviewed prior to creation of Plan. LABS: 
I reviewed today's most current labs and imaging studies. Pertinent labs include: 
Recent Labs  
  11/24/18 0241 11/23/18 0431 11/22/18 
0464 WBC 6.7 9.1 11.0 HGB 13.0 12.1 13.7 HCT 37.2 34.9* 40.3 * 130* 153 Recent Labs  
  11/24/18 0241 11/23/18 0431 11/22/18 
0624 11/21/18 
1339  138 133* 131*  
K 3.8 3.9 4.0 4.1 * 108 101 98 CO2 23 23 23 23 GLU 75 80 121* 178* BUN 17 26* 35* 24* CREA 0.92 0.95 1.28 1.42* CA 8.1* 7.8* 8.5 9.5 MG  --   --   --  2.0 ALB  --   --  3.7 4.6 TBILI  --   --  1.2* 1.2* SGOT  --   --  47* 36 ALT  --   --  14 16 Signed: Valeria Jeffery MD

## 2018-11-24 NOTE — PROGRESS NOTES
General Surgery End of Shift Nursing Note Shift worked:  7p-7a Summary of shift:    ZION overnight. VSS. NSR on tele. Denies pain. Issues for physician to address:   D/C Number times ambulated in hallway past shift: 0 Number of times OOB to chair past shift: 0 Up to bedside commode multi times through shift. Pain Management: 
Current medication: 0 Patient states pain is manageable on current pain medication: YES 
 
GI: 
 
Current diet:  DIET CLEAR LIQUID 
DIET NUTRITIONAL SUPPLEMENTS No; Breakfast, Lunch, Dinner; Ensure Clear Tolerating current diet: YES Passing flatus: YES Last Bowel Movement: 11/22 Patient Safety: 
 
Falls Score: 4 Bed Alarm On? Yes Sitter?  No 
 
Michael Renee RN

## 2018-11-24 NOTE — PROGRESS NOTES
Spiritual Care Partner Volunteer visited patient in Gen Surg on November 24, 2018. Documented by: 
RENE Menon, [address],  [facility]  Paging Service  [phone])

## 2018-11-25 VITALS
DIASTOLIC BLOOD PRESSURE: 93 MMHG | HEART RATE: 67 BPM | BODY MASS INDEX: 20.67 KG/M2 | TEMPERATURE: 97.6 F | OXYGEN SATURATION: 97 % | HEIGHT: 73 IN | WEIGHT: 156 LBS | SYSTOLIC BLOOD PRESSURE: 150 MMHG | RESPIRATION RATE: 18 BRPM

## 2018-11-25 PROCEDURE — 74011000250 HC RX REV CODE- 250: Performed by: INTERNAL MEDICINE

## 2018-11-25 PROCEDURE — 90686 IIV4 VACC NO PRSV 0.5 ML IM: CPT | Performed by: INTERNAL MEDICINE

## 2018-11-25 PROCEDURE — 74011250637 HC RX REV CODE- 250/637: Performed by: SPECIALIST

## 2018-11-25 PROCEDURE — 90471 IMMUNIZATION ADMIN: CPT

## 2018-11-25 PROCEDURE — 74011250636 HC RX REV CODE- 250/636: Performed by: INTERNAL MEDICINE

## 2018-11-25 PROCEDURE — 74011250637 HC RX REV CODE- 250/637: Performed by: INTERNAL MEDICINE

## 2018-11-25 RX ORDER — POLYETHYLENE GLYCOL 3350 17 G/17G
17 POWDER, FOR SOLUTION ORAL 2 TIMES DAILY
Qty: 1 EACH | Refills: 0 | Status: SHIPPED | OUTPATIENT
Start: 2018-11-25 | End: 2019-02-28

## 2018-11-25 RX ORDER — BISACODYL 5 MG
10 TABLET, DELAYED RELEASE (ENTERIC COATED) ORAL
Status: COMPLETED | OUTPATIENT
Start: 2018-11-25 | End: 2018-11-25

## 2018-11-25 RX ADMIN — BENZTROPINE MESYLATE 1 MG: 1 TABLET ORAL at 08:54

## 2018-11-25 RX ADMIN — BISACODYL 10 MG: 5 TABLET, COATED ORAL at 09:00

## 2018-11-25 RX ADMIN — INFLUENZA VIRUS VACCINE 0.5 ML: 15; 15; 15; 15 SUSPENSION INTRAMUSCULAR at 10:22

## 2018-11-25 RX ADMIN — Medication 10 ML: at 08:55

## 2018-11-25 RX ADMIN — AMANTADINE HYDROCHLORIDE 100 MG: 100 CAPSULE ORAL at 08:54

## 2018-11-25 RX ADMIN — HEPARIN SODIUM 5000 UNITS: 5000 INJECTION, SOLUTION INTRAVENOUS; SUBCUTANEOUS at 06:12

## 2018-11-25 RX ADMIN — DOCUSATE SODIUM 100 MG: 100 CAPSULE, LIQUID FILLED ORAL at 08:54

## 2018-11-25 RX ADMIN — ASPIRIN 325 MG: 325 TABLET ORAL at 08:55

## 2018-11-25 RX ADMIN — CARBIDOPA AND LEVODOPA 2 TABLET: 25; 100 TABLET ORAL at 08:54

## 2018-11-25 RX ADMIN — POLYETHYLENE GLYCOL 3350 17 G: 17 POWDER, FOR SOLUTION ORAL at 08:54

## 2018-11-25 RX ADMIN — Medication 10 ML: at 06:12

## 2018-11-25 NOTE — PROGRESS NOTES
1500--bedside report received from everett antonio . Pt sitting up in chair oriented x 3-4. Pt states \" ui want to down the lion and see my friend\" pt redirected. Has no complaints, chair alarm on. Call bell in reach assessment as noted. 1740--pt back in bed eating dinner. Call bell in reach. 0400--pt refuses to use urinal at bedside, oob to bathroom 6-8 times to void, no BM 
 
0700--bedside report given to everett antonio who is assuming care of pt

## 2018-11-25 NOTE — PROGRESS NOTES
F/U for bowel obstruction S: Mr. Brittany Alvarez was seen by me today during rounds. At this time, he is resting + comfortably. Eating well. NO bm since yesterday. The patient has no new complaints today. Please see admission consult for details of ROS; there are no other changes today. O: Blood pressure 121/75, pulse 77, temperature 98.4 °F (36.9 °C), resp. rate 18, height 6' 1\" (1.854 m), weight 70.8 kg (156 lb), SpO2 97 %. Gen: Patient is in no acute distress. There is no jaundice. Lungs: Clear to auscultation bilaterally . Heart:+RRR. Abd: Soft, non tender, non-distended, bowel sounds present. Extremities: Warm. Lab Results Component Value Date/Time WBC 6.7 11/24/2018 02:41 AM  
 HGB (POC) 14.0 10/04/2017 08:54 AM  
 HGB 13.0 11/24/2018 02:41 AM  
 HCT (POC) 42.4 10/04/2017 08:54 AM  
 HCT 37.2 11/24/2018 02:41 AM  
 PLATELET 433 (L) 42/86/2574 02:41 AM  
 MCV 93.0 11/24/2018 02:41 AM  
 
Lab Results Component Value Date/Time Sodium 142 11/24/2018 02:41 AM  
 Potassium 3.8 11/24/2018 02:41 AM  
 Chloride 110 (H) 11/24/2018 02:41 AM  
 CO2 23 11/24/2018 02:41 AM  
 Anion gap 9 11/24/2018 02:41 AM  
 Glucose 75 11/24/2018 02:41 AM  
 BUN 17 11/24/2018 02:41 AM  
 Creatinine 0.92 11/24/2018 02:41 AM  
 BUN/Creatinine ratio 18 11/24/2018 02:41 AM  
 GFR est AA >60 11/24/2018 02:41 AM  
 GFR est non-AA >60 11/24/2018 02:41 AM  
 Calcium 8.1 (L) 11/24/2018 02:41 AM  
 Bilirubin, total 1.2 (H) 11/22/2018 06:24 AM  
 AST (SGOT) 47 (H) 11/22/2018 06:24 AM  
 Alk. phosphatase 83 11/22/2018 06:24 AM  
 Protein, total 7.1 11/22/2018 06:24 AM  
 Albumin 3.7 11/22/2018 06:24 AM  
 Globulin 3.4 11/22/2018 06:24 AM  
 A-G Ratio 1.1 11/22/2018 06:24 AM  
 ALT (SGPT) 14 11/22/2018 06:24 AM  
  
Cross sectional imaging:  I reviewed yesterday's kub report A: Active Problems: 
  Constipation (11/21/2018) Bowel obstruction ([address] (11/22/2018) Comment:  Better, I would like to see more bowel movements after what he has been through P:  Dulcolax tabs now 
kub am if he is still here Eliseo Harrell MD 
8:25 AM 
11/25/2018

## 2018-11-25 NOTE — DISCHARGE SUMMARY
Physician Discharge Summary     Patient ID:  Iam Lerner  [id number]  8 y.o.  1945    Admit date: 11/21/2018  Discharge date and time:      Discharge Diagnoses: ileus, small bowel obstruction, parkinson's disease, hx MVR    Hospital Course: Mr. Noel Garg was admitted with worsening PD symptoms and constipation. He was noted to have a distended abdomen and required enemas for relief. He was made NPO. CT showed no definitive point of obstruction and it was felt his severe constipation was the cause of his distended small bowel with fluid back to the esophagus. He was seen by neurology, GI and surgery. He improved with conservative therapy. His serial abdominal films showed resolution. He will need neurology follow up for his PD. He will continue Miralax as needed for constipation. The patient has requested follow up with Dr. Darvin Villeda. PCP: Dania Ring MD  Consults: GI, Neurology and General Surgery    Significant Diagnostic Studies: CT abdomen, serial abdominal xrays    [unfilled]    [unfilled]      Discharge Exam:  Visit Vitals  /75   Pulse 76   Temp 98.4 °F (36.9 °C)   Resp 18   Ht 6' 1\" (1.854 m)   Wt 156 lb (70.8 kg)   SpO2 96%   BMI 20.58 kg/m²     General:  Alert, cooperative, no distress, appears stated age. Head:  Normocephalic, without obvious abnormality, atraumatic. Eyes:  Conjunctivae/corneas clear. PERRL, EOMs intact. Fundi benign   Ears:  Normal TMs and external ear canals both ears. Nose: Nares normal. Septum midline. Mucosa normal. No drainage or sinus tenderness. Throat: Lips, mucosa, and tongue normal. Teeth and gums normal.   Neck: Supple, symmetrical, trachea midline, no adenopathy, thyroid: no enlargement/tenderness/nodules, no carotid bruit and no JVD. Back:   Symmetric, no curvature. ROM normal. No CVA tenderness. Lungs:   Clear to auscultation bilaterally. Chest wall:  No tenderness or deformity.    Heart:  Regular rate and rhythm, S1, S2 normal, 2/6 murmur to left axilla, no click, rub or gallop. Abdomen:   Soft, non-tender. Bowel sounds normal. No masses,  No organomegaly. Genitalia:     Rectal:     Extremities: Extremities normal, atraumatic, no cyanosis or edema. Pulses: 2+ and symmetric all extremities. Skin: Skin color, texture, turgor normal. No rashes or lesions   Lymph nodes: Cervical, supraclavicular, and axillary nodes normal.   Neurologic: CNII-XII intact. Mask facies, minimal rigidity, shuffle gait, no or little tremor noted       Disposition: home    Patient Instructions:   Current Discharge Medication List      START taking these medications    Details   polyethylene glycol (MIRALAX) 17 gram packet Take 1 Packet by mouth two (2) times a day. Qty: 1 Each, Refills: 0         CONTINUE these medications which have NOT CHANGED    Details   amantadine HCl (SYMMETREL) 100 mg capsule Take 100 mg by mouth three (3) times daily. benztropine (COGENTIN) 2 mg tablet Take 2 mg by mouth three (3) times daily. carbidopa-levodopa (SINEMET)  mg per tablet       cholecalciferol, VITAMIN D3, (VITAMIN D3) 5,000 unit tab tablet Take 5,000 Units by mouth two (2) times a week.          STOP taking these medications       aspirin (ASPIRIN) 325 mg tablet Comments:   Reason for Stopping:         LYSINE PO Comments:   Reason for Stopping:         ALPHA LIPOIC ACID PO Comments:   Reason for Stopping:         TURMERIC ROOT EXTRACT PO Comments:   Reason for Stopping:         TURMERIC (CURCUMIN) Comments:   Reason for Stopping:         GLUTATHIONE Comments:   Reason for Stopping:         multivitamin (ONE A DAY) tablet Comments:   Reason for Stopping:         ascorbic acid, vitamin C, (VITAMIN C) powd Comments:   Reason for Stopping:         GINSENG PO Comments:   Reason for Stopping:         METHYLSULFONYLMETHANE (MSM PO) Comments:   Reason for Stopping:         MILK THISTLE, BULK, Comments:   Reason for Stopping:         ACETYLCYSTEINE (NAC PO) Comments:   Reason for Stopping:         IDEBENONE, BULK, Comments:   Reason for Stopping:         trihexyphenidyl (ARTANE) 2 mg tablet Comments:   Reason for Stopping:         SELENIUM (SELENIMIN PO) Comments:   Reason for Stopping:         Rgzcnnak-Dlov-Yfkngt-Hyalur Ac 665-160-38-2 mg cap Comments:   Reason for Stopping:         SAW PALMETTO XTR/ZINC PICOLIN (SAW PALMETTO EXTRACT PO) Comments:   Reason for Stopping:         LUTEIN EXTRACT/ZEAXANTHIN EXT (LUTEIN-ZEAXANTHIN PO) Comments:   Reason for Stopping:         cyanocobalamin 1,000 mcg tablet Comments:   Reason for Stopping:         DOCOSAHEXANOIC ACID/EPA (FISH OIL PO) Comments:   Reason for Stopping:                 Signed:  Jose Bryant MD  11/25/2018  9:19 AM

## 2018-11-25 NOTE — PROGRESS NOTES
Nurse spoke with patients wife Yuliana Golden in [place] script per wifes request to [facility] in [place]. Reviewed dc instructions via phone and wife request that Heladio Bloom pick patient up for dc, and copy of dc instructions will be sent with patient. If patient prefers to leave with wife then she request we text her at mobile number. Wife request patient have flu shot before dc, flu shot given in Right arm.

## 2018-11-25 NOTE — PROGRESS NOTES
Notified wife via text message per patient request that Justin Che has not come to  patient and he is ready for DC. Awaiting call or response 200- Nurse received message from patients wife, she will be here to  patient in one hour or less

## 2018-11-25 NOTE — DISCHARGE INSTRUCTIONS
[address] Fel[phone]  [phone]      Patient Discharge Instructions    Gregg Palmer / [id number] : 1945    Admitted 2018 Discharged: 18     Take Home Medications            · It is important that you take the medication exactly as they are prescribed. · Keep your medication in the bottles provided by the pharmacist and keep a list of the medication names, dosages, and times to be taken in your wallet. · Do not take other medications without consulting your doctor. What to do at Home    Recommended diet: Regular Diet,     Recommended activity: Activity as tolerated,       Follow-up with Dr. Erinn Kaye in 1 week. Call [phone] for your appointment. Information obtained by :  I understand that if any problems occur once I am at home I am to contact my physician. I understand and acknowledge receipt of the instructions indicated above.                                                                                                                                            Physician's or R.N.'s Signature                                                                  Date/Time                                                                                                                                              Patient or Representative Signature                                                          Date/Time

## 2018-11-25 NOTE — PROGRESS NOTES
Problem: Falls - Risk of 
Goal: *Absence of Falls Document Cecilio Vegas Fall Risk and appropriate interventions in the flowsheet. Fall Risk Interventions: 
Mobility Interventions: Bed/chair exit alarm, OT consult for ADLs, Patient to call before getting OOB, PT Consult for mobility concerns, PT Consult for assist device competence Mentation Interventions: Bed/chair exit alarm, Door open when patient unattended Medication Interventions: Bed/chair exit alarm, Patient to call before getting OOB Elimination Interventions: Call light in reach, Patient to call for help with toileting needs History of Falls Interventions: Bed/chair exit alarm, Door open when patient unattended Problem: Impaired Skin Integrity/Pressure Injury Treatment Goal: *Improvement of Existing Pressure Injury Continuing interventions to prevent further skin breakdown. Outcome: Progressing Towards Goal 
Call bell within reach, siderails upx3. Bed alarm on.  Pine in room

## 2018-11-25 NOTE — PROGRESS NOTES
Progress Note NAME: Melita Hooper :  1945 MRN:  [id number] Assessment / Plan: 
Small bowel obstruction with severe constipation and dilated stomach/esophagus in setting of Parkinson's disease: 
- CT A/P with small bowel obstruction possibly secondary to marked fecal impaction of the colon. Large amount of stool throughout the colon and rectum. Dilated fluid-filled esophagus and dilated stomach. Hepatic cysts. Spondylosis with L2, L3 and L4 compression fractures. - repeat KUB11/23 with decreased gaseous distention. - con't colace and miralax - GI and general surgery consults appreciated 
-tolerating regular diet 
-home today with office follow up Parkinson's disease with increasing motor dysfunction: diagnosed in , felt he was doing fairly well until this last week 
- no recent brain imaging 
- neurology recommending that Pt con't home regimen (not compliant) 
- con't amantadine, cogentin and sinemet  
- PT/OT evals hopefully today Compression fracture of L2, L3 and L4: denies pain - tylenol if needed 
- PT/OT consulted Hyponatremia, mild: resolving Mitral valve prolapse Feel elevated Cr not likely MAINOR, probably baseline CKD2 
  
Code Status: Full code Surrogate Decision Maker: wife Michelle Morales [phone]  
DVT Prophylaxis: heparin  
  
Baseline: exercise classes 3x per week, functional  
 
Subjective: Chief Complaint / Reason for Physician Visit \"I'm doing better\". Has 1 additional stool. Discussed with RN events overnight. Review of Systems: 
Symptom Y/N Comments  Symptom Y/N Comments Fever/Chills n   Chest Pain n   
Poor Appetite n   Edema n   
Cough n   Abdominal Pain n   
Sputum n   Joint Pain SOB/LOPEZ n   Pruritis/Rash Nausea/vomit    Tolerating PT/OT Diarrhea    Tolerating Diet Constipation    Other Could NOT obtain due to:   
 
Objective: VITALS:  
Last 24hrs VS reviewed since prior progress note. Most recent are: Patient Vitals for the past 24 hrs: 
 Temp Pulse Resp BP SpO2  
11/25/18 0851 98.4 °F (36.9 °C) 76 18 112/75 96 % 11/24/18 2333 98.4 °F (36.9 °C) 77 18 121/75 97 % 11/24/18 1855 98 °F (36.7 °C) 78 18 108/63 96 % 11/24/18 1532 97.7 °F (36.5 °C) (!) 59 18 143/82 100 % 11/24/18 1113 98.1 °F (36.7 °C) 67 18 126/62 94 % Intake/Output Summary (Last 24 hours) at 11/25/2018 0208 Last data filed at 11/25/2018 1908 Gross per 24 hour Intake 1200 ml Output  Net 1200 ml PHYSICAL EXAM: 
General: WD, WN. Alert, cooperative, no acute distress   
EENT:  EOMI. Anicteric sclerae. MMM Resp:  CTA bilaterally, no wheezing or rales. No accessory muscle use CV:  Regular rhythm,  No edema GI:  Soft, non distended, Non tender.  nl bowel sounds. Neurologic:  Alert and oriented X 3, normal speech, Psych:   Limited insight. Not anxious nor agitated Skin:  No rashes. No jaundice Reviewed most current lab test results and cultures  YES Reviewed most current radiology test results   YES Review and summation of old records today    NO Reviewed patient's current orders and MAR    YES 
PMH/SH reviewed - no change compared to H&P 
________________________________________________________________________ Care Plan discussed with: 
  Comments Patient x Family RN x Care Manager Consultant Multidiciplinary team rounds were held today with , nursing, pharmacist and clinical coordinator. Patient's plan of care was discussed; medications were reviewed and discharge planning was addressed. ________________________________________________________________________ Total NON critical care TIME:  25 Minutes Total CRITICAL CARE TIME Spent:   Minutes non procedure based Comments >50% of visit spent in counseling and coordination of care x   
________________________________________________________________________ 
Beronica Woodall MD  
 
 Procedures: see electronic medical records for all procedures/Xrays and details which were not copied into this note but were reviewed prior to creation of Plan. LABS: 
I reviewed today's most current labs and imaging studies. Pertinent labs include: 
Recent Labs  
  11/24/18 0241 11/23/18 0431 WBC 6.7 9.1 HGB 13.0 12.1 HCT 37.2 34.9*  
* 130* Recent Labs  
  11/24/18 0241 11/23/18 0431  138  
K 3.8 3.9 * 108 CO2 23 23 GLU 75 80 BUN 17 26* CREA 0.92 0.95  
CA 8.1* 7.8* Signed: Jimena Solitario MD

## 2018-11-26 ENCOUNTER — PATIENT OUTREACH (OUTPATIENT)
Dept: INTERNAL MEDICINE CLINIC | Age: 73
End: 2018-11-26

## 2018-11-26 NOTE — PROGRESS NOTES
Hospital Discharge Follow-Up Date/Time:  2018 2:16 PM 
 
Patient was admitted to [facility] on 18 and discharged on 18 for ileus, small bowel obstruction. The physician discharge summary was available at the time of outreach. Patient was contacted within one business day of discharge. Top Challenges reviewed with the provider Patient's wife would like him to be followed by neurologist for Parkinson's. Dr. Sherree Schilder was consulted during recent hospital stay. Method of communication with provider :chart routing Inpatient RRAT score: 12 Was this a readmission? no  
Patient stated reason for the readmission: n/a Nurse Navigator (NN) contacted the family by telephone to perform post hospital discharge assessment. Verified name and  with family as identifiers. Provided introduction to self, and explanation of the Nurse Navigator role. Reviewed discharge instructions and red flags with family who verbalized understanding. Family given an opportunity to ask questions and does not have any further questions or concerns at this time. The family agrees to contact the PCP office for questions related to their healthcare. NN provided contact information for future reference. Disease Specific:   N/A Summary of patient's top problems: 1. Patient was recently admitted for small bowel obstruction due to fecal impaction. Neurology was consulted due to patient having Parkinson's disease, and explained to patient that Parkinson's medications can worsen constipation. He will require a bowel regimen to prevent this. 2. Non-compliant with medication regimen for Parkinson's - will need f/u with neurologist. 
 
 
[address]er orders at discharge: none [address]: n/a Date of initial visit: n/a Durable Medical Equipment ordered/company: n/a Durable Medical Equipment received: n/a Barriers to care? ineffective coping, medication management Advance Care Planning:  
Does patient have an Advance Directive:  not on file; education provided Medication(s):  
New Medications at Discharge: miralax Changed Medications at Discharge: n/a Discontinued Medications at Discharge: asa, lysine, alpha lipoic acid, turmeric, glutathione, multivitamin, vit c, ginseng, MSM, milk thistle, acetylcysteine, idebenone, artane, selenium, glucosam-finn, saw palmetto, lutein extract, cyanocobalamin, fish oil Medication reconciliation was performed with patient, who verbalizes understanding of administration of home medications. There were no barriers to obtaining medications identified at this time. Referral to Pharm D needed: no  
 
Current Outpatient Medications Medication Sig  polyethylene glycol (MIRALAX) 17 gram packet Take 1 Packet by mouth two (2) times a day.  amantadine HCl (SYMMETREL) 100 mg capsule Take 100 mg by mouth three (3) times daily.  benztropine (COGENTIN) 2 mg tablet Take 2 mg by mouth three (3) times daily.  carbidopa-levodopa (SINEMET)  mg per tablet  cholecalciferol, VITAMIN D3, (VITAMIN D3) 5,000 unit tab tablet Take 5,000 Units by mouth two (2) times a week. No current facility-administered medications for this visit. There are no discontinued medications. [facility] follow up appointment(s):  
Future Appointments Date Time Provider Candy Guzmani 11/30/2018 10:00 AM Aurelia Joyce MD [address] [place] [address],[facility] [id number] [facility] follow up appointment(s): n/a [facility]:  n/a  
 
 
Goals  Attends follow-up appointments as directed. 11/26/18- Spoke with patient and his wife re: recent hospital stay. Wife is anxious to discuss getting an appointment for her  with a neurologist to follow his Parkinson's disease. She will discuss with PCP on visit 11/30/18. / vs 
  
  prevent further small bowel obstruction   11/26/18- NN spoke to patient and his wife re: recent hospital stay for small bowel obstruction / ileus, caused by constipation. Patient was discharged on Miralax to be taken twice daily. He has not yet had a bowel movement, however has not had much food  intake since being discharged.   Discussed importance of taking Miralax as prescribed, and to call if no BM in another 24-48 hr.  He has a f/u with PCP on 11/30/18.  / vs

## 2018-11-29 PROBLEM — S91.339A: Status: RESOLVED | Noted: 2018-02-07 | Resolved: 2018-11-29

## 2018-11-29 PROBLEM — Z80.0 FAMILY HISTORY OF COLON CANCER REQUIRING SCREENING COLONOSCOPY: Status: RESOLVED | Noted: 2018-02-07 | Resolved: 2018-11-29

## 2018-11-29 PROBLEM — R60.9 DEPENDENT EDEMA: Status: RESOLVED | Noted: 2018-03-26 | Resolved: 2018-11-29

## 2018-11-30 ENCOUNTER — OFFICE VISIT (OUTPATIENT)
Dept: INTERNAL MEDICINE CLINIC | Age: 73
End: 2018-11-30

## 2018-11-30 VITALS
HEIGHT: 73 IN | DIASTOLIC BLOOD PRESSURE: 80 MMHG | OXYGEN SATURATION: 98 % | BODY MASS INDEX: 19.56 KG/M2 | HEART RATE: 72 BPM | TEMPERATURE: 97.1 F | RESPIRATION RATE: 20 BRPM | WEIGHT: 147.6 LBS | SYSTOLIC BLOOD PRESSURE: 128 MMHG

## 2018-11-30 DIAGNOSIS — M80.00XS AGE-RELATED OSTEOPOROSIS WITH CURRENT PATHOLOGICAL FRACTURE, SEQUELA: ICD-10-CM

## 2018-11-30 DIAGNOSIS — M48.56XS COMPRESSION FRACTURE OF LUMBAR SPINE, NON-TRAUMATIC, SEQUELA: ICD-10-CM

## 2018-11-30 DIAGNOSIS — G20 PARKINSON'S DISEASE (TREMOR, STIFFNESS, SLOW MOTION, UNSTABLE POSTURE) (HCC): Primary | ICD-10-CM

## 2018-11-30 DIAGNOSIS — L72.3 SEBACEOUS CYST: ICD-10-CM

## 2018-11-30 DIAGNOSIS — K56.7 ILEUS OF UNSPECIFIED TYPE (HCC): ICD-10-CM

## 2018-11-30 DIAGNOSIS — N40.0 PROSTATISM: ICD-10-CM

## 2018-11-30 DIAGNOSIS — K59.00 CONSTIPATION, UNSPECIFIED CONSTIPATION TYPE: ICD-10-CM

## 2018-11-30 NOTE — PROGRESS NOTES
Transitions Of Care [facility], [facility], 11/21 - 11/25, small bowel obstruction, Parkinson's Disease)       HPI:  Nahun Bonner is a 68y.o. year old male who is here for a follow up visit for hospitalization transition of care. He was last seen by me never  Discharged on: 11/25/2018 and called within 48 hours of discharge by nursing staff. Diagnosis in hospital: Bowel obstruction due to fecal impaction    Complications in hospital:  none    Medication reconciliation was completed this visit and medication changes include the following: all supplements stopped    Discharge Summary reviewed. He reports the following:    Mr. Samir Stephen returns in follow up of recent hospitalization. I have not seen him before so this is my first visit with him. He presented to the hospital with a bowel obstruction secondary to fecal impaction. He had been taking a significant number of supplements which contributed to this problem. They were all discontinued in the hospital.  He did respond to enemas and laxatives and resolved his bowel obstruction. He was discharged on his Parkinson's meds primarily, Miralax and vitamin D. He has been doing fine since returning home. His Parkinson's is actually under relatively good control. He is mobile with only moderate tremor. We are going to make sure that he sees a neurologist on a regular basis and we have made that referral as of  Today. He also is complaining of a sebaceous cyst on his scalp that is enlarging and we referred him to Dr. Bri Chambers for consideration of excision. He denies any new cardiorespiratory symptoms, including shortness of breath, orthopnea, PND, increasing lower extremity edema. Bowels are moving with the Miralax. Neurologically he is stable on his current regimen. Lab work was stable in the hospital.  We will plan on seeing him again in three months' time and will  regular care for him and a checkup ultimately.   He feels good today and there are no other new issues. Visit Vitals  /80 (BP 1 Location: Left arm, BP Patient Position: Sitting)   Pulse 72   Temp 97.1 °F (36.2 °C) (Oral)   Resp 20   Ht 6' 1\" (1.854 m)   Wt 147 lb 9.6 oz (67 kg)   SpO2 98%   BMI 19.47 kg/m²       Historical Data    Patient Active Problem List    Diagnosis Date Noted    Parkinson's disease (tremor, stiffness, slow motion, unstable posture) ([facility]) 12/08/2017     Priority: 1 - One    Bowel obstruction ([address] 11/22/2018     Priority: 2 - Two    Constipation 11/21/2018     Priority: 2 - Two    Compression fracture of lumbar spine, non-traumatic, sequela 10/10/2017     Priority: 3 - Three    Age-related osteoporosis with current pathological fracture 10/10/2017     Priority: 3 - Three    MVP (mitral valve prolapse) 11/19/2009     Priority: 4 - Four    Prostatism 11/19/2009     Priority: 4 - Four    Basal cell carcinoma of nose 02/04/2013     Priority: 5 - Five    Annual physical exam 10/10/2017       Past Surgical History:   Procedure Laterality Date    COLONOSCOPY N/A 2/7/2018    COLONOSCOPY performed by Sin Faith MD at [place] [address] HX APPENDECTOMY  childhood    HX COLONOSCOPY         Outpatient Medications Marked as Taking for the 11/30/18 encounter (Office Visit) with Noe Matt MD   Medication Sig Dispense Refill    polyethylene glycol (MIRALAX) 17 gram packet Take 1 Packet by mouth two (2) times a day. 1 Each 0    amantadine HCl (SYMMETREL) 100 mg capsule Take 100 mg by mouth three (3) times daily.  benztropine (COGENTIN) 2 mg tablet Take 2 mg by mouth three (3) times daily.  carbidopa-levodopa (SINEMET)  mg per tablet 1 Tab three (3) times daily.  cholecalciferol, VITAMIN D3, (VITAMIN D3) 5,000 unit tab tablet Take 5,000 Units by mouth two (2) times a week.           No Known Allergies     Social History     Socioeconomic History    Marital status:      Spouse name: Not on file    Number of children: Not on file  Years of education: Not on file    Highest education level: Not on file   Social Needs    Financial resource strain: Not on file    Food insecurity - worry: Not on file    Food insecurity - inability: Not on file    Transportation needs - medical: Not on file   Independent Space needs - non-medical: Not on file   Occupational History    Not on file   Tobacco Use    Smoking status: Former Smoker    Smokeless tobacco: Never Used   Substance and Sexual Activity    Alcohol use: No    Drug use: No    Sexual activity: Not on file   Other Topics Concern    Not on file   Social History Narrative    ** Merged History Encounter **             Review of Systems              Constitutional:  He denies fever, weight loss, sweats or fatigue. HEENT:  No blurred or double vision, headache or dizziness. No difficulty with swallowing, taste, speech or smell. Respiratory:  No cough, wheezing or shortness of breath. No sputum production. Cardiac:  Denies chest pain, palpitations, unexplained indigestion, syncope, edema, PND or orthopnea. GI:  No changes in bowel movements, no abdominal pain, no bloating, anorexia, nausea, vomiting or heartburn. :  No frequency or dysuria. Denies incontinence. Extremities:  No joint pain, stiffness or swelling. Skin:  No recent rashes or mole changes. Neurological:  No numbness, tingling, burning paresthesias or loss of motor strength. No syncope, dizziness, frequent headaches or memory loss. Vitals:    11/30/18 1021   BP: 128/80   Pulse: 72   Resp: 20   Temp: 97.1 °F (36.2 °C)   TempSrc: Oral   SpO2: 98%   Weight: 147 lb 9.6 oz (67 kg)   Height: 6' 1\" (1.854 m)   PainSc:   0 - No pain       Body mass index is 19.47 kg/m². Physical Examination:              General Appearance:  Well-developed, well-nourished, no acute  distress. HEENT:      Ears:  The TMs and ear canals were clear.   Eyes:  The pupillary responses were normal.  Extraocular muscle function intact. Lids and conjunctiva not injected. Neck:  Supple without thyromegaly or adenopathy. No JVD noted. Lungs:  Clear to auscultation and percussion. Cardiac:  Regular rate and rhythm without murmur. GI: nontender w/o mass. Normal BS's. Extremities:  No clubbing, cyanosis or edema. Skin:  No rash or unusual mole changes noted. Neurological:  Grossly normal.      1. Parkinson's disease (tremor, stiffness, slow motion, unstable posture) ([facility])    - REFERRAL TO NEUROLOGY    2. Constipation, unspecified constipation type    3. Compression fracture of lumbar spine, non-traumatic, sequela      4. Ileus of unspecified type ([facility]    5. Age-related osteoporosis with current pathological fracture, sequela      6. Prostatism      7. Sebaceous cyst    - REFERRAL TO PLASTIC SURGERY      I have reviewed the patient's medical history in detail and updated the computerized patient record. We had a prolonged discussion about these complex clinical issues and went over the various important aspects to consider. All questions were answered. Advised him to call back or return to office if symptoms do not improve, change in nature, or persist.    He was given an after visit summary or informed of [employer] which includes patient instructions, diagnoses, current medications, & vitals. He expressed understanding with the diagnosis and plan.

## 2018-11-30 NOTE — PROGRESS NOTES
1. Have you been to the ER, urgent care clinic since your last visit? Hospitalized since your last visit? Yes, 11/21/2018 - 11/25/2018, [facility] [place], for small bowel obstruction, Parkinson's Disease. 2. Have you seen or consulted any other health care providers outside of the [address] [place] [address] since your last visit? Include any pap smears or colon screening.  No    Chief Complaint   Patient presents with   [facility], [facility] - [facility]25, small bowel obstruction, Parkinson's Disease

## 2018-12-12 ENCOUNTER — OFFICE VISIT (OUTPATIENT)
Dept: NEUROLOGY | Age: 73
End: 2018-12-12

## 2018-12-12 VITALS
DIASTOLIC BLOOD PRESSURE: 66 MMHG | SYSTOLIC BLOOD PRESSURE: 100 MMHG | HEART RATE: 59 BPM | HEIGHT: 73 IN | WEIGHT: 155 LBS | OXYGEN SATURATION: 98 % | BODY MASS INDEX: 20.54 KG/M2

## 2018-12-12 DIAGNOSIS — G24.9 DYSKINESIA: ICD-10-CM

## 2018-12-12 DIAGNOSIS — G20 PARKINSON DISEASE (HCC): Primary | ICD-10-CM

## 2018-12-12 DIAGNOSIS — I95.1 ORTHOSTATIC HYPOTENSION: ICD-10-CM

## 2018-12-12 DIAGNOSIS — K59.03 DRUG-INDUCED CONSTIPATION: ICD-10-CM

## 2018-12-12 RX ORDER — CARBIDOPA AND LEVODOPA 25; 100 MG/1; MG/1
2 TABLET ORAL 3 TIMES DAILY
Qty: 180 TAB | Refills: 1 | Status: SHIPPED | OUTPATIENT
Start: 2018-12-12 | End: 2019-01-10 | Stop reason: SDUPTHER

## 2018-12-12 RX ORDER — AMANTADINE HYDROCHLORIDE 100 MG/1
100 CAPSULE, GELATIN COATED ORAL 3 TIMES DAILY
Qty: 90 CAP | Refills: 1 | Status: SHIPPED | OUTPATIENT
Start: 2018-12-12 | End: 2019-01-10

## 2018-12-12 NOTE — PATIENT INSTRUCTIONS
A Healthy Lifestyle: Care Instructions  Your Care Instructions    A healthy lifestyle can help you feel good, stay at a healthy weight, and have plenty of energy for both work and play. A healthy lifestyle is something you can share with your whole family. A healthy lifestyle also can lower your risk for serious health problems, such as high blood pressure, heart disease, and diabetes. You can follow a few steps listed below to improve your health and the health of your family. Follow-up care is a key part of your treatment and safety. Be sure to make and go to all appointments, and call your doctor if you are having problems. It's also a good idea to know your test results and keep a list of the medicines you take. How can you care for yourself at home? · Do not eat too much sugar, fat, or fast foods. You can still have dessert and treats now and then. The goal is moderation. · Start small to improve your eating habits. Pay attention to portion sizes, drink less juice and soda pop, and eat more fruits and vegetables. ? Eat a healthy amount of food. A 3-ounce serving of meat, for example, is about the size of a deck of cards. Fill the rest of your plate with vegetables and whole grains. ? Limit the amount of soda and sports drinks you have every day. Drink more water when you are thirsty. ? Eat at least 5 servings of fruits and vegetables every day. It may seem like a lot, but it is not hard to reach this goal. A serving or helping is 1 piece of fruit, 1 cup of vegetables, or 2 cups of leafy, raw vegetables. Have an apple or some carrot sticks as an afternoon snack instead of a candy bar. Try to have fruits and/or vegetables at every meal.  · Make exercise part of your daily routine. You may want to start with simple activities, such as walking, bicycling, or slow swimming. Try to be active 30 to 60 minutes every day. You do not need to do all 30 to 60 minutes all at once.  For example, you can exercise 3 times a day for 10 or 20 minutes. Moderate exercise is safe for most people, but it is always a good idea to talk to your doctor before starting an exercise program.  · Keep moving. Kathi Seller the lawn, work in the garden, or [employer]. Take the stairs instead of the elevator at work. · If you smoke, quit. People who smoke have an increased risk for heart attack, stroke, cancer, and other lung illnesses. Quitting is hard, but there are ways to boost your chance of quitting tobacco for good. ? Use nicotine gum, patches, or lozenges. ? Ask your doctor about stop-smoking programs and medicines. ? Keep trying. In addition to reducing your risk of diseases in the future, you will notice some benefits soon after you stop using tobacco. If you have shortness of breath or asthma symptoms, they will likely get better within a few weeks after you quit. · Limit how much alcohol you drink. Moderate amounts of alcohol (up to 2 drinks a day for men, 1 drink a day for women) are okay. But drinking too much can lead to liver problems, high blood pressure, and other health problems. Family health  If you have a family, there are many things you can do together to improve your health. · Eat meals together as a family as often as possible. · Eat healthy foods. This includes fruits, vegetables, lean meats and dairy, and whole grains. · Include your family in your fitness plan. Most people think of activities such as jogging or tennis as the way to fitness, but there are many ways you and your family can be more active. Anything that makes you breathe hard and gets your heart pumping is exercise. Here are some tips:  ? Walk to do errands or to take your child to school or the bus.  ? Go for a family bike ride after dinner instead of watching TV. Where can you learn more? Go to http://adrian-mitchel.info/. Enter Z499 in the search box to learn more about \"A Healthy Lifestyle: Care Instructions. \"  Current as of: December 7, 2017  Content Version: 11.8  © 0651-9822 [employer]. Care instructions adapted under license by [employer] (which disclaims liability or warranty for this information). If you have questions about a medical condition or this instruction, always ask your healthcare professional. [address] any warranty or liability for your use of this information. Office Policies    o Phone calls/patient messages:  Please allow up to 24 hours for someone in the office to contact you about your call or message. Be mindful your provider may be out of the office or your message may require further review. We encourage you to use [employer] for your messages as this is a faster, more efficient way to communicate with our office    o Medication Refills:  Prescription medications require up to 48 business hours to process. We encourage you to use [employer] for your refills. For controlled medications: Please allow up to 72 business hours to process. Certain medications may require you to  a written prescription at our office. NO narcotic/controlled medications will be prescribed after 4pm Monday through Friday or on weekends    o Form/Paperwork Completion:  We ask that you allow 7-14 business days. You may also download your forms to [employer] to have your doctor print off. Effective December 28,2018, we will be moving across the [facility] to:  [address] [place] [address] 2 [address]  Our phone number will remain the same at [phone]       Parkinson's Disease: Care Instructions  Your Care Instructions  Parkinson's disease can cause tremors, stiffness, and problems with movement. Severe or advanced cases can also cause problems with thinking. In Parkinson's disease, part of the brain cannot make enough dopamine, a chemical that helps control movement.  Taking your medicines correctly and getting regular exercise may help you maintain your quality of life. There are many things that can cause Parkinson's disease symptoms, including some medicine, some toxins, and trauma to the head. The cause in most cases is not known. Follow-up care is a key part of your treatment and safety. Be sure to make and go to all appointments, and call your doctor if you are having problems. It's also a good idea to know your test results and keep a list of the medicines you take. How can you care for yourself at home? General care  · Take your medicines exactly as prescribed. Call your doctor if you think you are having a problem with your medicine. · Make sure your home is safe:  ? Place furniture so that you have something to hold on to as you walk around the house. ? Use chairs that make it easier to sit down and stand up. ? Group the things you use most, such as reading glasses, keys, and the telephone, in one easy-to-reach place. ? Tack down rugs so that you do not trip. ? Put no-slip tape and handrails in the tub to prevent falls. · Use a cane, walker, or scooter if your doctor suggests it. · Keep up your normal activities as much as you can. · Find ways to manage stress, which can make symptoms worse. · Spend time with family and friends. Join a support group for people with Parkinson's disease if you want extra help. · Depression is common with this condition. Tell your doctor if you have trouble sleeping, are eating too much or are not hungry, or feel sad or tearful all the time. Depression can be treated with medicine and counseling. Diet and exercise  · Eat a balanced diet. · If you are taking levodopa, do not eat protein at the same time you take your medicine. Levodopa may not work as well if you take it at the same time you eat protein. You can eat normal amounts of protein. Talk to your doctor if you have questions. · If you have problems swallowing, change how and what you eat:  ? Try thick drinks, such as milk shakes.  They are easier to swallow than other fluids. ? Do not eat foods that crumble easily. These can cause choking. ? Use a  to prepare food. Soft foods need less chewing. ? Eat small meals often so that you do not get tired from eating heavy meals. · Drink plenty of water and eat a high-fiber diet to prevent constipation. Parkinson's--and the medicines that treat it--may slow your intestines. · Get exercise on most days. Work with your doctor to set up a program of walking, swimming, or other exercise you are able to do. When should you call for help? Call your doctor now or seek immediate medical care if:    · You have a change in your symptoms.     · You develop other problems from your condition, such as:  ? Injury from a fall. ? Thinking or memory problems. ? A urinary tract infection (burning pain when urinating).    Watch closely for changes in your health, and be sure to contact your doctor if:    · You lose weight because of problems with eating.     · You want more information about your condition or your medicines. Where can you learn more? Go to http://adrian-mitchel.info/. Enter D788 in the search box to learn more about \"Parkinson's Disease: Care Instructions. \"  Current as of: June 4, 2018  Content Version: 11.8  © 5715-2320 [employer]. Care instructions adapted under license by [employer] (which disclaims liability or warranty for this information). If you have questions about a medical condition or this instruction, always ask your healthcare professional. [place] [address] any warranty or liability for your use of this information. Orthostatic Hypotension: Care Instructions  Your Care Instructions    Orthostatic hypotension is a quick drop in blood pressure. It happens when you get up from sitting or lying down. You may feel faint, lightheaded, or dizzy. When a person sits up or stands up, the body changes the way it pumps blood. This can slow the flow of blood to the brain for a very short time. And that can make you feel lightheaded. Many medicines can cause this problem, especially in older people. Lack of fluids (dehydration) or illnesses such as diabetes or heart disease also can cause it. Follow-up care is a key part of your treatment and safety. Be sure to make and go to all appointments, and call your doctor if you are having problems. It's also a good idea to know your test results and keep a list of the medicines you take. How can you care for yourself at home? · Tell your doctor about any problems you have with your medicines. · If your doctor prescribes medicine to help prevent a low blood pressure problem, take it exactly as prescribed. Call your doctor if you think you are having a problem with your medicine. · Drink plenty of fluids, enough so that your urine is light yellow or clear like water. Choose water and other caffeine-free clear liquids. If you have kidney, heart, or liver disease and have to limit fluids, talk with your doctor before you increase the amount of fluids you drink. · Limit or avoid alcohol and caffeine. · Get up slowly from bed or after sitting for a long time. If you are in bed, roll to your side and swing your legs over the edge of the bed and onto the floor. Push your body up to a sitting position. Wait for a while before you slowly stand up. If you are dizzy or lightheaded, sit or lie down. When should you call for help? Call 911 anytime you think you may need emergency care. For example, call if:    · You passed out (lost consciousness).    Watch closely for changes in your health, and be sure to contact your doctor if:    · You do not get better as expected. Where can you learn more? Go to http://adrian-mitchel.info/. Enter G268 in the search box to learn more about \"Orthostatic Hypotension: Care Instructions. \"  Current as of: December 6, 2017  Content Version: 11.8  © 7676-5411 [employer]. Care instructions adapted under license by [employer] (which disclaims liability or warranty for this information). If you have questions about a medical condition or this instruction, always ask your healthcare professional. [address] any warranty or liability for your use of this information.

## 2018-12-12 NOTE — PROGRESS NOTES
NEUROLOGY CLINIC NOTE    Patient ID:  Michael Donohue  [id number]  32 y.o.  1945    Date of Consultation:  December 12, 2018    Reason for Consultation:  Hospital follow up for PD  Chief Complaint   Patient presents with   [facility] Follow Up       History of Present Illness:     Patient Active Problem List    Diagnosis Date Noted    Bowel obstruction ([facility] 11/22/2018    Constipation 11/21/2018    Parkinson's disease (tremor, stiffness, slow motion, unstable posture) ([facility] 12/08/2017    Annual physical exam 10/10/2017    Compression fracture of lumbar spine, non-traumatic, sequela 10/10/2017    Age-related osteoporosis with current pathological fracture 10/10/2017    Basal cell carcinoma of nose 02/04/2013    MVP (mitral valve prolapse) 11/19/2009    Prostatism 11/19/2009     Past Medical History:   Diagnosis Date    Mitral valve disorders(424.0) 11/19/2009    4/3/17 followed by PCP no cardiologist    Parkinson disease ([facility]     Prostatism 11/19/2009    Skin cancer       Past Surgical History:   Procedure Laterality Date    COLONOSCOPY N/A 2/7/2018    COLONOSCOPY performed by Alexandra Martinez MD at [facility] HX APPENDECTOMY  childhood    HX COLONOSCOPY        Prior to Admission medications    Medication Sig Start Date End Date Taking? Authorizing Provider   amantadine HCl (SYMMETREL) 100 mg capsule Take 100 mg by mouth three (3) times daily. Yes Other, MD Devan   benztropine (COGENTIN) 2 mg tablet Take 2 mg by mouth three (3) times daily. Yes Other, MD Devan   carbidopa-levodopa (SINEMET)  mg per tablet 2 Tabs three (3) times daily. 3/19/18  Yes Provider, Historical   cholecalciferol, VITAMIN D3, (VITAMIN D3) 5,000 unit tab tablet Take 5,000 Units by mouth two (2) times a week. Yes Provider, Historical   polyethylene glycol (MIRALAX) 17 gram packet Take 1 Packet by mouth two (2) times a day.  11/25/18   Fernando Lorenzo MD     No Known Allergies   Social History Tobacco Use    Smoking status: Former Smoker    Smokeless tobacco: Never Used   Substance Use Topics    Alcohol use: No      Family History   Problem Relation Age of Onset    Cancer Mother         colon        Subjective:      Natalie Delgado is a 68 y.o. LHWM with history of PD diagnosed in 2014 and was previously being followed by Dr. Keli Long who is here for further evaluation and management of his Parkinson's disease. Patient was admitted from [address] ER last 11/21/2018 for small bowel obstruction and constipation. Patient apparently showed up in the emergency room because of worsening issues with his walking as well as development of abdominal pain radiating to his back. Patient endorses a history of chronic low back pain secondary to compression fractures. He apparently only has bowel movements 3 times a week and was even less this past week. Patient reports recent adjustment of his PD medication by Dr. Keli Long. Patient however admits that he does not take the medication as prescribed. He may take some of the medication several times a week. He admits not to be taking any of PD medication daily as prescribed. He also does not take his BP medications at exact times. He reports he is going to physical therapy twice a week either boxing therapy or yoga. Denies any recent falls. In the ER, blood pressure was 135/99. Laboratory workup revealed hyponatremia with sodium of 131, elevated creatinine and decreased GFR. Increased lactic acid. CT of the abdomen revealed small bowel obstruction secondary to colonic fecal impaction. Patient was supposed to be taking amantadine 100 mg 3 times daily, Cogentin 2 mg 3 times daily, Artane 4 mg 3 times daily and Sinemet 25/100,  2 3 times daily. Neurological examination reveals masked faces, monotonous voice, mild bradykinesia, resting upper extremity tremors, significant postural instability, shuffling gait and decreased arm swing. No cogwheel rigidity. Findings consistent with Parkinson's disease. When seen the next day on 11/23/2018, patient was much improved. Significantly less resting tremors and more mobile. Patient was discharge 11/25/2018 on same medication regimen except Artane. Patient was seen by his new PCP 11/30/2018. Note mentions patient doing well from a Parkinson's standpoint. He is mobile with only moderate tremor. Patient was referred here for further management. Per patient and wife since then, patient has been having difficulty walking for the past few days. Patient has been unsteady and has had freezing spells. When asked if he is actually taking his Parkinson's medications as prescribed, patient admits to not taking them as he should. Outside reports reviewed: office notes, ER records, lab reports, historical medical records. Review of Systems:    A comprehensive review of systems was performed:   Constitutional: positive for fatigue, poor appetite  Eyes: positive for none  Ears, nose, mouth, throat, and face: positive for hearing loss, snoring  Respiratory: positive for none  Cardiovascular: positive for none  Gastrointestinal: positive for constipation   Genitourinary: positive for none  Integument/breast: positive for none  Hematologic/lymphatic: positive for none  Musculoskeletal: positive for joint pain, myalgia, weakness   Neurological: positive for none  Behavioral/Psych: positive for anxiety, depression   Endocrine: positive for none  Allergic/Immunologic: positive for none      Objective:     Visit Vitals  /66 (BP 1 Location: Left arm, BP Patient Position: Standing)   Pulse (!) 59   Ht 6' 1\" (1.854 m)   Wt 155 lb (70.3 kg) Comment: patient reported   SpO2 98%   BMI 20.45 kg/m²       PHYSICAL EXAM:    NEUROLOGICAL EXAM:     Appearance: The patient is well developed, well nourished, provides a coherent history and is in no acute distress.    Mental Status: Oriented to time, place and person. Fluent, no aphasia or dysarthria. Mask facies. Monotonous voice and decrease voice caliber today. Mood and affect appropriate. Cranial Nerves:   II - XII were intact except for nicanor-buccal dyskinesia   Motor:  5/5 strength. Normal bulk and tone. No cogwheel rigidity. Less bradykinesia. Reflexes:   Deep tendon reflexes 2+/4 and symmetrical. Toes downgoing. Sensory:   Normal to cold, pinprick and vibration. Gait:  Able to stand quickly from sitting. Wide based and slight unsteady. No shuffling but still less arm swing. Tremor:   No obvious resting tremors today. Cerebellar:  Intact FTN/TAYLOR/HTS. Assessment:   Parkinson's disease  Orthostatic hypotension  Dyskinesia  Constipation    Plan:   Neurological examination reveals findings typically seen in Parkinson's disease. However less bradykinesia no cogwheel rigidity, no resting pill-rolling tremor and shuffling. Masked faces, decreased voice caliber, unsteady gait and decreased arm swing. Patient is responding to current regimen. Discussed with patient and wife the need to take Parkinson's medication routinely at exact times and frequencies. Then it can be determined if the issue is dosing or frequency that is causing his off periods. They understood. For now patient is to continue taking carbidopa/levodopa 25/100 mg 2 tablets 3 times daily. If despite that, patient is having breakthrough tremors, then start amantadine 100 mg daily and up to 3 times daily if necessary. Hold off on taking benztropine. Continue vitamin D supplementation. Patient was also referred to home health for physical therapy, occupational therapy and speech therapies. Patient clearly should not be driving currently.   With regards to the issues of his appetite, patient was advised to take supplementation such as Ensure and etc.    Patient on testing was orthostatic (supine blood pressure of 138/84 and heart rate of 59, sitting blood pressure of 92/66 and heart rate of 67, standing blood pressure of 100/66 and heart rate of 71). Discussed with patient and wife that this is likely both due to his Parkinson's and side effects of Parkinson's medication. Reduction of his PD regimen may help. If condition persists and he becomes symptomatic then we will start him on medications specifically for PD patients with orthostatic hypotension. Patient was advised to increase salt intake, increase fluids and wear support stockings. Patient is also having oral buccal dyskinesias which may represent overmedication. Adjustment as above should help. Monitor closely. Fall precautions. Advised to use all necessary equipment to prevent from falling. With regards to his constipation which is likely PD related and medication induced, advised to try black licorice daily and see if it helps normalize his bowel movements. All questions and concerns were answered.

## 2018-12-20 ENCOUNTER — PATIENT OUTREACH (OUTPATIENT)
Dept: INTERNAL MEDICINE CLINIC | Age: 73
End: 2018-12-20

## 2018-12-20 NOTE — PROGRESS NOTES
Patient has graduated from the Transitions of Care Coordination  program on 12/20/18  . Patient's symptoms are stable at this time. Patient/family has the ability to self-manage. Care management goals have been completed at this time. No further nurse navigator follow up scheduled. Goals Addressed                 This Visit's Progress     COMPLETED: Attends follow-up appointments as directed. On track     11/26/18- Spoke with patient and his wife re: recent hospital stay. Wife is anxious to discuss getting an appointment for her  with a neurologist to follow his Parkinson's disease. She will discuss with PCP on visit 11/30/18. / vs       COMPLETED: prevent further small bowel obstruction   On track     11/26/18- NN spoke to patient and his wife re: recent hospital stay for small bowel obstruction / ileus, caused by constipation. Patient was discharged on Miralax to be taken twice daily. He has not yet had a bowel movement, however has not had much food  intake since being discharged. Discussed importance of taking Miralax as prescribed, and to call if no BM in another 24-48 hr.  He has a f/u with PCP on 11/30/18.  / vs            Pt has nurse navigator's contact information for any further questions, concerns, or needs.   Patients upcoming visits:    Future Appointments   Date Time Provider Candy Aguilera   1/10/2019  3:40 PM Kaitlin Rollins MD [address] [place] [address]   2/28/2019 10:30 AM Rosemary Joyce MD [address] [place] [address], [phone]

## 2019-01-02 ENCOUNTER — OFFICE VISIT (OUTPATIENT)
Dept: INTERNAL MEDICINE CLINIC | Age: 74
End: 2019-01-02

## 2019-01-02 VITALS
HEIGHT: 73 IN | SYSTOLIC BLOOD PRESSURE: 133 MMHG | OXYGEN SATURATION: 97 % | BODY MASS INDEX: 19.43 KG/M2 | WEIGHT: 146.6 LBS | DIASTOLIC BLOOD PRESSURE: 90 MMHG | TEMPERATURE: 97.6 F | HEART RATE: 66 BPM

## 2019-01-02 DIAGNOSIS — G20 PARKINSON'S DISEASE (TREMOR, STIFFNESS, SLOW MOTION, UNSTABLE POSTURE) (HCC): Primary | ICD-10-CM

## 2019-01-02 DIAGNOSIS — R63.4 WEIGHT LOSS: ICD-10-CM

## 2019-01-02 DIAGNOSIS — R53.82 CHRONIC FATIGUE: ICD-10-CM

## 2019-01-02 LAB
ALBUMIN SERPL-MCNC: 4.2 G/DL (ref 3.9–5.4)
ALKALINE PHOS POC: 95 U/L (ref 38–126)
ALT SERPL-CCNC: 21 U/L (ref 9–52)
AST SERPL-CCNC: 30 U/L (ref 14–36)
BACTERIA UA POCT, BACTPOCT: NORMAL
BILIRUB UR QL STRIP: NEGATIVE
BUN BLD-MCNC: 25 MG/DL (ref 9–20)
CALCIUM BLD-MCNC: 9.4 MG/DL (ref 8.4–10.2)
CASTS UA POCT: NEGATIVE
CHLORIDE BLD-SCNC: 98 MMOL/L (ref 98–107)
CLUE CELLS, CLUEPOCT: NEGATIVE
CO2 POC: 32 MMOL/L (ref 22–32)
CREAT BLD-MCNC: 0.8 MG/DL (ref 0.8–1.5)
CRYSTALS UA POCT, CRYSPOCT: NEGATIVE
EGFR (POC): 88.6
EPITHELIAL CELLS POCT: NORMAL
GLUCOSE POC: 89 MG/DL (ref 75–110)
GLUCOSE UR-MCNC: NEGATIVE MG/DL
GRAN# POC: 4.2 K/UL (ref 2–7.8)
GRAN% POC: 73.7 % (ref 37–92)
HCT VFR BLD CALC: 42.8 % (ref 37–51)
HGB BLD-MCNC: 14.3 G/DL (ref 12–18)
KETONES P FAST UR STRIP-MCNC: NEGATIVE MG/DL
LY# POC: 1.2 K/UL (ref 0.6–4.1)
LY% POC: 23 % (ref 10–58.5)
MCH RBC QN: 32.7 PG (ref 26–32)
MCHC RBC-ENTMCNC: 33.5 G/DL (ref 30–36)
MCV RBC: 98 FL (ref 80–97)
MID #, POC: 0.1 K/UL (ref 0–1.8)
MID% POC: 3.3 % (ref 0.1–24)
MUCUS UA POCT, MUCPOCT: NORMAL
PH UR STRIP: 6.5 [PH] (ref 5–7)
PLATELET # BLD: 162 K/UL (ref 140–440)
POTASSIUM SERPL-SCNC: 4.4 MMOL/L (ref 3.6–5)
PROT SERPL-MCNC: 7 G/DL (ref 6.3–8.2)
PROT UR QL STRIP: NEGATIVE
RBC # BLD: 4.38 M/UL (ref 4.2–6.3)
RBC UA POCT, RBCPOCT: NORMAL
SODIUM SERPL-SCNC: 140 MMOL/L (ref 137–145)
SP GR UR STRIP: 1.02 (ref 1.01–1.02)
TOTAL BILIRUBIN POC: 1 MG/DL (ref 0.2–1.3)
TRICH UA POCT, TRICHPOC: NEGATIVE
UA UROBILINOGEN AMB POC: NORMAL (ref 0.2–1)
URINALYSIS CLARITY POC: CLEAR
URINALYSIS COLOR POC: NORMAL
URINE BLOOD POC: NORMAL
URINE CULT COMMENT, POCT: NORMAL
URINE LEUKOCYTES POC: NEGATIVE
URINE NITRITES POC: NEGATIVE
WBC # BLD: 5.5 K/UL (ref 4.1–10.9)
WBC UA POCT, WBCPOCT: NORMAL
YEAST UA POCT, YEASTPOC: NEGATIVE

## 2019-01-02 NOTE — PROGRESS NOTES
Gilles Stiles presents with   Chief Complaint   Patient presents with    Fatigue   Patient here with complaint of fatigue that has been present for about a week. States he just doesn't feel good. No fever or chills noted. 1. Have you been to the ER, urgent care clinic since your last visit? Hospitalized since your last visit? No    2. Have you seen or consulted any other health care providers outside of the [address] [place] [address] since your last visit? Include any pap smears or colon screening.  No

## 2019-01-02 NOTE — PROGRESS NOTES
Subjective:   Srikanth Hernandez is a 68 y.o. male      Chief Complaint   Patient presents with    Fatigue        History of present illness:  Mr. Cinthia Tracy returns concerned about increased issues with feeling tired and having some lethargy. 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 He is also concerned about continued weight loss. We talked about weight loss and people with Parkinson's disease and the fact that they burn more calories and eat more slowly and therefore fill up sooner and do not eat as much as they have usually been accustomed to. We talked about supplements such as Ensure, Boost or [employer] Instant Breakfast to take along with his meals and even make milkshakes out of them if need be. This should improve his weight. With regard to his lethargy, his newest medicine is Amantadine and this certainly could be a contributing factor. He is only taking one a day and I am going to ask him to stop that at least for the time being and see how he does. We will also screen him with a variety of blood work, but I do not see anything else that jumps out as an issue relative to his fatigue, etc.  We will get his labs and hold the Amantadine and see how he does. He is scheduled to see me in February and he will keep that appointment unless his symptoms persist, in which case he will get back in touch with us. We will call him with results of the lab.         Patient Active Problem List    Diagnosis Date Noted    Parkinson's disease (tremor, stiffness, slow motion, unstable posture) ([facility] [address] 12/08/2017     Priority: 1 - One    Bowel obstruction ([address] 11/22/2018     Priority: 2 - Two    Constipation 11/21/2018     Priority: 2 - Two    Chronic fatigue 01/02/2019     Priority: 3 - Three    Weight loss 01/02/2019     Priority: 3 - Three    Compression fracture of lumbar spine, non-traumatic, sequela 10/10/2017     Priority: 3 - Three    Age-related osteoporosis with current pathological fracture 10/10/2017     Priority: 3 - Three    MVP (mitral valve prolapse) 11/19/2009     Priority: 4 - Four    Prostatism 11/19/2009     Priority: 4 - Four    Basal cell carcinoma of nose 02/04/2013     Priority: 5 - Five    Annual physical exam 10/10/2017      Past Surgical History:   Procedure Laterality Date    COLONOSCOPY N/A 2/7/2018    COL[facility] performed by [facility] at [facility] AM[facility]    HX APPENDECTOMY  childhood    HX COLONOSCOPY        No Known Allergies   Family History   Problem Relation Age of Onset    Cancer Mother         colon      Social History     Socioeconomic History    Marital status:      Spouse name: Not on file    Number of children: Not on file    Years of education: Not on file    Highest education level: Not on file   Social Needs    Financial resource strain: Not on file    Food insecurity - worry: Not on file    Food insecurity - inability: Not on file   [employer] needs - medical: Not on file   [employer] needs - non-medical: Not on file   Occupational History    Not on file   Tobacco Use    Smoking status: Former Smoker    Smokeless tobacco: Never Used   Substance and Sexual Activity    Alcohol use: No    Drug use: No    Sexual activity: Not on file   Other Topics Concern    Not on file   Social History Narrative    ** Merged History Encounter **          Outpatient Medications Marked as Taking for the 1/2/19 encounter (Office Visit) with Bettye Wong MD   Medication Sig Dispense Refill    carbidopa-levodopa (SINEMET)  mg per tablet Take 2 Tabs by mouth three (3) times daily. 180 Tab 1    amantadine HCl (SYMMETREL) 100 mg capsule Take 1 Cap by mouth three (3) times daily. 90 Cap 1    polyethylene glycol (MIRALAX) 17 gram packet Take 1 Packet by mouth two (2) times a day. 1 Each 0    cholecalciferol, VITAMIN D3, (VITAMIN D3) 5,000 unit tab tablet Take 5,000 Units by mouth two (2) times a week.           Review of Systems              Constitutional:  [place]  fatigue. HEENT:  No blurred or double vision, headache or dizziness. No difficulty with swallowing, taste, speech or smell. Respiratory:  No cough, wheezing or shortness of breath. No sputum production. Cardiac:  Denies chest pain, palpitations, unexplained indigestion, syncope, edema, PND or orthopnea. GI:  No changes in bowel movements, no abdominal pain, no bloating, anorexia, nausea, vomiting or heartburn. :  No frequency or dysuria. Denies incontinence. Extremities:  No joint pain, stiffness or swelling. Skin:  No recent rashes or mole changes. Neurological: tremor is significant. No focal weakness    Objective:     Vitals:    01/02/19 1534   BP: 133/90   Pulse: 66   Temp: 97.6 °F (36.4 °C)   TempSrc: Oral   SpO2: 97%   Weight: 146 lb 9.6 oz (66.5 kg)   Height: 6' 1\" (1.854 m)       Body mass index is 19.34 kg/m². Physical Examination:              General Appearance:  Well-developed, well-nourished, no acute  distress. HEENT:     Ears:  The TMs and ear canals were clear. Eyes:  The pupillary responses were normal.  Extraocular muscle function intact. Lids and conjunctiva not injected. Neck:  Supple without thyromegaly or adenopathy. No JVD noted. Lungs:  Clear to auscultation and percussion. Cardiac:  Regular rate and rhythm without murmur. GI: nontender w/o mass. Normal BS's. Extremities:  No clubbing, cyanosis or edema. Skin:  No rash or unusual mole changes noted. Neurological:  Tremor and bradykinesia. Assessment/Plan:   Impressions:      ICD-10-CM ICD-9-CM    1. Parkinson's disease (tremor, stiffness, slow motion, unstable posture) ([facility]) G20 332.0    2. Chronic fatigue R53.82 780.79 AMB POC COMPLETE CBC,AUTOMATED ENTER      AMB POC COMPREHENSIVE METABOLIC PANEL      AMB POC TSH      AMB POC URINALYSIS DIP STICK AUTO W/ MICRO    3.  Weight loss R63.4 783.21 AMB POC COMPLETE CBC,AUTOMATED ENTER AMB POC COMPREHENSIVE METABOLIC PANEL      AMB POC TSH      AMB POC URINALYSIS DIP STICK AUTO W/ MICRO         Plan:  1. Continue present meds  2. Discussed lifestyle modifications including Na restriction, low carb/fat diet, weight reduction and exercise (at least a walking program). Follow-up Disposition:  Return for As scheduled.       Orders Placed This Encounter    AMB POC COMPLETE CBC,AUTOMATED ENTER    AMB POC COMPREHENSIVE METABOLIC PANEL    AMB POC TSH    AMB POC URINALYSIS DIP STICK AUTO W/ MICRO        Teryl Baumgarten, MD

## 2019-01-03 LAB — TSH BLD-ACNC: 1.7 UIU/ML (ref 0.4–4.2)

## 2019-01-03 NOTE — PROGRESS NOTES
Hgb and WBC normal.  Blood sugar, liver and kidney function normal.  UA clear.   Thyroid normal.  See how does off amantadine

## 2019-01-04 DIAGNOSIS — G20 PARKINSON'S DISEASE (HCC): Primary | ICD-10-CM

## 2019-01-04 RX ORDER — BENZTROPINE MESYLATE 2 MG/1
TABLET ORAL
Refills: 11 | COMMUNITY
Start: 2018-12-11 | End: 2019-01-10

## 2019-01-04 NOTE — PROGRESS NOTES
Patient informed that Hgb and WBC normal.  Blood sugar, liver and kidney function normal.  UA clear.  Thyroid normal.  See how does off amantadine

## 2019-01-07 ENCOUNTER — DOCUMENTATION ONLY (OUTPATIENT)
Dept: NEUROLOGY | Age: 74
End: 2019-01-07

## 2019-01-07 NOTE — PROGRESS NOTES
Faxed referral over to [facility] for focused ultrasound treatment.

## 2019-01-09 DIAGNOSIS — G20 PARKINSON DISEASE (HCC): ICD-10-CM

## 2019-01-09 NOTE — TELEPHONE ENCOUNTER
Received 90 day authorization from [employer] for amantadine 100MG. Dr. Clint Anna, please review and sign.

## 2019-01-10 ENCOUNTER — OFFICE VISIT (OUTPATIENT)
Dept: NEUROLOGY | Age: 74
End: 2019-01-10

## 2019-01-10 VITALS
DIASTOLIC BLOOD PRESSURE: 78 MMHG | SYSTOLIC BLOOD PRESSURE: 112 MMHG | HEART RATE: 81 BPM | HEIGHT: 73 IN | OXYGEN SATURATION: 98 % | BODY MASS INDEX: 19.34 KG/M2

## 2019-01-10 DIAGNOSIS — G20 PARKINSON DISEASE (HCC): ICD-10-CM

## 2019-01-10 DIAGNOSIS — K59.03 DRUG-INDUCED CONSTIPATION: ICD-10-CM

## 2019-01-10 DIAGNOSIS — I95.1 ORTHOSTATIC HYPOTENSION: Primary | ICD-10-CM

## 2019-01-10 DIAGNOSIS — G24.9 DYSKINESIA: ICD-10-CM

## 2019-01-10 RX ORDER — CARBIDOPA AND LEVODOPA 25; 100 MG/1; MG/1
1 TABLET, EXTENDED RELEASE ORAL
Qty: 30 TAB | Refills: 2 | Status: SHIPPED | OUTPATIENT
Start: 2019-01-10 | End: 2019-03-21 | Stop reason: SDUPTHER

## 2019-01-10 RX ORDER — AMANTADINE HYDROCHLORIDE 100 MG/1
100 CAPSULE, GELATIN COATED ORAL 3 TIMES DAILY
Qty: 270 CAP | Refills: 1 | OUTPATIENT
Start: 2019-01-10

## 2019-01-10 RX ORDER — CARBIDOPA AND LEVODOPA 25; 100 MG/1; MG/1
2 TABLET ORAL 3 TIMES DAILY
Qty: 180 TAB | Refills: 5 | Status: SHIPPED | OUTPATIENT
Start: 2019-01-10 | End: 2019-02-28

## 2019-01-10 NOTE — PATIENT INSTRUCTIONS
Parkinson's Disease: Care Instructions Your Care Instructions Parkinson's disease can cause tremors, stiffness, and problems with movement. Severe or advanced cases can also cause problems with thinking. In Parkinson's disease, part of the brain cannot make enough dopamine, a chemical that helps control movement. Taking your medicines correctly and getting regular exercise may help you maintain your quality of life. There are many things that can cause Parkinson's disease symptoms, including some medicine, some toxins, and trauma to the head. The cause in most cases is not known. Follow-up care is a key part of your treatment and safety. Be sure to make and go to all appointments, and call your doctor if you are having problems. It's also a good idea to know your test results and keep a list of the medicines you take. How can you care for yourself at home? General care · Take your medicines exactly as prescribed. Call your doctor if you think you are having a problem with your medicine. · Make sure your home is safe: 
? Place furniture so that you have something to hold on to as you walk around the house. ? Use chairs that make it easier to sit down and stand up. ? Group the things you use most, such as reading glasses, keys, and the telephone, in one easy-to-reach place. ? Tack down rugs so that you do not trip. ? Put no-slip tape and handrails in the tub to prevent falls. · Use a cane, walker, or scooter if your doctor suggests it. · Keep up your normal activities as much as you can. · Find ways to manage stress, which can make symptoms worse. · Spend time with family and friends. Join a support group for people with Parkinson's disease if you want extra help. · Depression is common with this condition. Tell your doctor if you have trouble sleeping, are eating too much or are not hungry, or feel sad or tearful all the time. Depression can be treated with medicine and counseling. Diet and exercise · Eat a balanced diet. · If you are taking levodopa, do not eat protein at the same time you take your medicine. Levodopa may not work as well if you take it at the same time you eat protein. You can eat normal amounts of protein. Talk to your doctor if you have questions. · If you have problems swallowing, change how and what you eat: ? Try thick drinks, such as milk shakes. They are easier to swallow than other fluids. ? Do not eat foods that crumble easily. These can cause choking. ? Use a  to prepare food. Soft foods need less chewing. ? Eat small meals often so that you do not get tired from eating heavy meals. · Drink plenty of water and eat a high-fiber diet to prevent constipation. Parkinson'sand the medicines that treat itmay slow your intestines. · Get exercise on most days. Work with your doctor to set up a program of walking, swimming, or other exercise you are able to do. When should you call for help? Call your doctor now or seek immediate medical care if: 
  · You have a change in your symptoms.  
  · You develop other problems from your condition, such as: 
? Injury from a fall. ? Thinking or memory problems. ? A urinary tract infection (burning pain when urinating).  
 Watch closely for changes in your health, and be sure to contact your doctor if: 
  · You lose weight because of problems with eating.  
  · You want more information about your condition or your medicines. Where can you learn more? Go to http://adrian-mitchel.info/. Enter Z820 in the search box to learn more about \"Parkinson's Disease: Care Instructions. \" Current as of: June 4, 2018 Content Version: 11.8 © 7794-3950 [employer]. Care instructions adapted under license by [employer] (which disclaims liability or warranty for this information).  If you have questions about a medical condition or this instruction, always ask your healthcare professional. [place] [address] any warranty or liability for your use of this information. Orthostatic Hypotension: Care Instructions Your Care Instructions Orthostatic hypotension is a quick drop in blood pressure. It happens when you get up from sitting or lying down. You may feel faint, lightheaded, or dizzy. When a person sits up or stands up, the body changes the way it pumps blood. This can slow the flow of blood to the brain for a very short time. And that can make you feel lightheaded. Many medicines can cause this problem, especially in older people. Lack of fluids (dehydration) or illnesses such as diabetes or heart disease also can cause it. Follow-up care is a key part of your treatment and safety. Be sure to make and go to all appointments, and call your doctor if you are having problems. It's also a good idea to know your test results and keep a list of the medicines you take. How can you care for yourself at home? · Tell your doctor about any problems you have with your medicines. · If your doctor prescribes medicine to help prevent a low blood pressure problem, take it exactly as prescribed. Call your doctor if you think you are having a problem with your medicine. · Drink plenty of fluids, enough so that your urine is light yellow or clear like water. Choose water and other caffeine-free clear liquids. If you have kidney, heart, or liver disease and have to limit fluids, talk with your doctor before you increase the amount of fluids you drink. · Limit or avoid alcohol and caffeine. · Get up slowly from bed or after sitting for a long time. If you are in bed, roll to your side and swing your legs over the edge of the bed and onto the floor. Push your body up to a sitting position. Wait for a while before you slowly stand up. If you are dizzy or lightheaded, sit or lie down. When should you call for help? Call 911 anytime you think you may need emergency care. For example, call if: 
  · You passed out (lost consciousness).  
 Watch closely for changes in your health, and be sure to contact your doctor if: 
  · You do not get better as expected. Where can you learn more? Go to http://adrian-mitchel.info/. Enter I181 in the search box to learn more about \"Orthostatic Hypotension: Care Instructions. \" Current as of: December 6, 2017 Content Version: 11.8 © 3258-3292 [employer]. Care instructions adapted under license by [employer] (which disclaims liability or warranty for this information). If you have questions about a medical condition or this instruction, always ask your healthcare professional. [address] any warranty or liability for your use of this information. Constipation: Care Instructions Your Care Instructions Constipation means that you have a hard time passing stools (bowel movements). People pass stools from 3 times a day to once every 3 days. What is normal for you may be different. Constipation may occur with pain in the rectum and cramping. The pain may get worse when you try to pass stools. Sometimes there are small amounts of bright red blood on toilet paper or the surface of stools. This is because of enlarged veins near the rectum (hemorrhoids). A few changes in your diet and lifestyle may help you avoid ongoing constipation. Your doctor may also prescribe medicine to help loosen your stool. Some medicines can cause constipation. These include pain medicines and antidepressants. Tell your doctor about all the medicines you take. Your doctor may want to make a medicine change to ease your symptoms. Follow-up care is a key part of your treatment and safety. Be sure to make and go to all appointments, and call your doctor if you are having problems.  It's also a good idea to know your test results and keep a list of the medicines you take. How can you care for yourself at home? · Drink plenty of fluids, enough so that your urine is light yellow or clear like water. If you have kidney, heart, or liver disease and have to limit fluids, talk with your doctor before you increase the amount of fluids you drink. · Include high-fiber foods in your diet each day. These include fruits, vegetables, beans, and whole grains. · Get at least 30 minutes of exercise on most days of the week. Walking is a good choice. You also may want to do other activities, such as running, swimming, cycling, or playing tennis or team sports. · Take a fiber supplement, such as Citrucel or Metamucil, every day. Read and follow all instructions on the label. · Schedule time each day for a bowel movement. A daily routine may help. Take your time having your bowel movement. · Support your feet with a small step stool when you sit on the toilet. This helps flex your hips and places your pelvis in a squatting position. · Your doctor may recommend an over-the-counter laxative to relieve your constipation. Examples are Milk of Magnesia and MiraLax. Read and follow all instructions on the label. Do not use laxatives on a long-term basis. When should you call for help? Call your doctor now or seek immediate medical care if: 
  · You have new or worse belly pain.  
  · You have new or worse nausea or vomiting.  
  · You have blood in your stools.  
 Watch closely for changes in your health, and be sure to contact your doctor if: 
  · Your constipation is getting worse.  
  · You do not get better as expected. Where can you learn more? Go to http://adrian-mitchel.info/. Enter 21  in the search box to learn more about \"Constipation: Care Instructions. \" Current as of: November 20, 2017 Content Version: 11.8 © 1272-0083 [employer].  Care instructions adapted under license by [address] (which disclaims liability or warranty for this information). If you have questions about a medical condition or this instruction, always ask your healthcare professional. [address] any warranty or liability for your use of this information.

## 2019-01-10 NOTE — PROGRESS NOTES
NEUROLOGY CLINIC NOTE Patient ID: Osiris Chatman 
[id number] 
60 y.o. 
1945 Date of Visit:  January 10, 2019 Reason for Visit:  Parkinson's disease Chief Complaint Patient presents with  Follow-up  
  things are improving since last visit History of Present Illness:  
 
Patient Active Problem List  
 Diagnosis Date Noted  Chronic fatigue 01/02/2019 Priority: 3 - Three  Weight loss 01/02/2019 Priority: 3 - Three  Bowel obstruction ([address] 11/22/2018  Constipation 11/21/2018  Parkinson's disease (tremor, stiffness, slow motion, unstable posture) ([address] 12/08/2017  Annual physical exam 10/10/2017  Compression fracture of lumbar spine, non-traumatic, sequela 10/10/2017  Age-related osteoporosis with current pathological fracture 10/10/2017  Basal cell carcinoma of nose 02/04/2013  MVP (mitral valve prolapse) 11/19/2009  Prostatism 11/19/2009 Past Medical History:  
Diagnosis Date  Mitral valve disorders(424.0) 11/19/2009 4/3/17 followed by PCP no cardiologist  
 Parkinson disease ([address]  Prostatism 11/19/2009  
 Skin cancer Past Surgical History:  
Procedure Laterality Date  COLONOSCOPY N/A 2/7/2018 COLONOSCOPY performed by Pinky Nissen, MD at . [facility] HX APPENDECTOMY  childhood  HX COLONOSCOPY Prior to Admission medications Medication Sig Start Date End Date Taking? Authorizing Provider  
carbidopa-levodopa (SINEMET)  mg per tablet Take 2 Tabs by mouth three (3) times daily. 12/12/18  Yes Linh Guerrero MD  
polyethylene glycol (MIRALAX) 17 gram packet Take 1 Packet by mouth two (2) times a day. 11/25/18  Yes Vikas Ruiz MD  
cholecalciferol, VITAMIN D3, (VITAMIN D3) 5,000 unit tab tablet Take 5,000 Units by mouth two (2) times a week.    Yes Provider, Historical  
benztropine (COGENTIN) 2 mg tablet TAKE 1 TABLET BY MOUTH THREE TIMES A DAY 12/11/18   Provider, Historical  
 amantadine HCl (SYMMETREL) 100 mg capsule Take 1 Cap by mouth three (3) times daily. 12/12/18   Erasto Ritter MD  
 
No Known Allergies Social History Tobacco Use  Smoking status: Former Smoker  Smokeless tobacco: Never Used Substance Use Topics  Alcohol use: No  
  
Family History Problem Relation Age of Onset  Cancer Mother   
     colon Subjective:  
  
Taqueria Turk is a 76 y.o. LHWM with history of PD diagnosed in 2014 and was previously being followed by Dr. Irlanda Reagna who is here for further evaluation and management of his Parkinson's disease. Patient was admitted from [facility] last 11/21/2018 for small bowel obstruction and constipation. Patient apparently showed up in the emergency room because of worsening issues with his walking as well as development of abdominal pain radiating to his back. Patient endorses a history of chronic low back pain secondary to compression fractures. He apparently only has bowel movements 3 times a week and was even less this past week. Patient reports recent adjustment of his PD medication by Dr. Irlanda Reagan. Patient however admits that he does not take the medication as prescribed. He may take some of the medication several times a week. He admits not to be taking any of PD medication daily as prescribed. He also does not take his BP medications at exact times. He reports he is going to physical therapy twice a week either boxing therapy or yoga. Denies any recent falls. In the ER, blood pressure was 135/99. Laboratory workup revealed hyponatremia with sodium of 131, elevated creatinine and decreased GFR. Increased lactic acid. CT of the abdomen revealed small bowel obstruction secondary to colonic fecal impaction. Patient was supposed to be taking amantadine 100 mg 3 times daily, Cogentin 2 mg 3 times daily, Artane 4 mg 3 times daily and Sinemet 25/100,  2 3 times daily. Neurological examination reveals masked faces, monotonous voice, mild bradykinesia, resting upper extremity tremors, significant postural instability, shuffling gait and decreased arm swing. No cogwheel rigidity. Findings consistent with Parkinson's disease. When seen the next day on 11/23/2018, patient was much improved. Significantly less resting tremors and more mobile. Patient was discharge 11/25/2018 on same medication regimen except Artane. Patient was seen by his new PCP 11/30/2018. Note mentions patient doing well from a Parkinson's standpoint. He is mobile with only moderate tremor. Patient was referred here for further management. Per patient and wife since then, patient has been having difficulty walking for the past few days. Patient has been unsteady and has had freezing spells. When asked if he is actually taking his Parkinson's medications as prescribed, patient admits to not taking them as he should. Since the last visit on 12/12/2018, patient reports significant improvement of his condition. Significantly less dyskinesias. Patient is able to ambulate much better and less freezing spells. Wife does report that patient continues to have issues early morning with gait stability. He has stopped taking benztropine. Patient stopped taking amantadine 8 days prior to consult due to fatigue. He is currently only taking Sinemet 25/100 mg, 2 tablets 3 times a day (8:30 AM, 1 PM and 8-9 PM). Patient reports some weaning of benefit 1-2 hours prior to the next dose. Less issues with constipation. Patient was referred to [facility] for further evaluation and possible ultrasound treatment for his tremors due to Parkinson's per his request.  He is back to doing boxing exercises several times a week. Outside reports reviewed: none Review of Systems: A comprehensive review of systems was performed:  
Constitutional: positive for weight loss, poor appetite Gastrointestinal: positive for diarrhea Musculoskeletal: positive for myalgia, weakness Neurological: positive for tremors, balance issues Objective:  
 
Visit Vitals /78 Pulse 81 Ht 6' 1\" (1.854 m) SpO2 98% BMI 19.34 kg/m² PHYSICAL EXAM: 
 
NEUROLOGICAL EXAM: 
  
Appearance: The patient is well developed, well nourished, provides a coherent history and is in no acute distress. Mental Status: Oriented to time, place and person. Fluent, no aphasia or dysarthria. Mask facies. Monotonous voice and decrease voice caliber today. Mood and affect appropriate. Cranial Nerves:   II - XII were intact with significantly less nicanor-buccal dyskinesia Motor:  5/5 strength. Normal bulk and tone. No cogwheel rigidity. Less bradykinesia. Reflexes:   Deep tendon reflexes 2+/4 and symmetrical. Toes downgoing. Sensory:   Normal to cold, pinprick and vibration. Gait:  Able to stand quickly from sitting. Wide based and slight unsteady. No shuffling but still less arm swing. Tremor:   No obvious resting tremors today. Cerebellar:  Intact FTN/TAYLOR/HTS. Assessment:  
Parkinson's disease Orthostatic hypotension Dyskinesia Constipation Plan:  
Neurological examination reveals findings typically seen in Parkinson's disease. Still with less bradykinesia no cogwheel rigidity, no resting pill-rolling tremor and shuffling. Masked faces, decreased voice caliber, unsteady gait and decreased arm swing. Patient is responding to current regimen. Reminded about the need to take Parkinson's medication routinely at exact times and frequencies. Then it can be determined if the issue is dosing or frequency that is causing his off periods. They understood. For now patient is to continue taking carbidopa/levodopa 25/100 mg 2 tablets 3 times daily. Prescription was provided.  May add on a night time dose of Sinemet CR 25/100 mg if issues with early morning stability persists. Prescription was provided. Off of Amantadine, Artane and Benztropine. Continue vitamin D supplementation. Patient to continue home health for physical therapy, occupational therapy and speech therapies. Patient clearly should not be driving currently. With regards to the issues of his appetite, patient was advised to take supplementation such as Ensure and etc. Patient referred to [facility] for ultrasound treatment of his tremors secondary to PD. Patient on testing was not as orthostatic anymore. Supine blood pressure of 152/85 and heart rate of 62, sitting blood pressure of 148/87 and heart rate of 67, standing blood pressure of 125/81 and heart rate of 69). Discussed with patient and wife that this is likely both due to his Parkinson's and side effects of Parkinson's medication. Reduction of his PD regimen is helping. If condition persists and he becomes symptomatic then we will start him on medications specifically for PD patients with orthostatic hypotension. Patient was advised to increase salt intake, increase fluids and wear support stockings. Patient is also having less oral buccal dyskinesias which was due to overmedication. Adjustment as above has helped. Fall precautions. Advised to use all necessary equipment to prevent from falling. With regards to his constipation which is likely PD related and medication induced, continue OTC regimen. All questions and concerns were answered.

## 2019-02-07 ENCOUNTER — TELEPHONE (OUTPATIENT)
Dept: NEUROLOGY | Age: 74
End: 2019-02-07

## 2019-02-07 NOTE — TELEPHONE ENCOUNTER
Left message for patient to call back. Also informed that he would have to contact the [facility] disease [facility] movement clinic to postpone the ultrasound that we don't handle that schedule here.

## 2019-02-07 NOTE — TELEPHONE ENCOUNTER
----- Message from Taya Jang sent at 2/7/2019  3:36 PM EST -----  Regarding: Dr. Reid Potter stated he has an ultrasound that was ordered and he would like to postpone it at this time. [phone].

## 2019-02-08 ENCOUNTER — TELEPHONE (OUTPATIENT)
Dept: NEUROLOGY | Age: 74
End: 2019-02-08

## 2019-02-08 NOTE — TELEPHONE ENCOUNTER
Mr. Zenovia Crigler is concerned that the ultrasound will eventually cause Dementia and cognitive decline in later years. He read an article that concerns him. He was wondering what Dr. Eveline Eason opinion on the subject is and if he should have it or not.       Please advise

## 2019-02-11 NOTE — TELEPHONE ENCOUNTER
Please advise patient that the visit to [facility] regarding the ultrasound treatment for PD tremors is for them to see if he is even a candidate and they will also discuss with him the pros and cons. He can then decide if he wants to pursue it or not.

## 2019-02-12 ENCOUNTER — TELEPHONE (OUTPATIENT)
Dept: NEUROLOGY | Age: 74
End: 2019-02-12

## 2019-02-12 NOTE — TELEPHONE ENCOUNTER
Spoke with the patient and relayed the message from Dr. Julio Weaver about how the first appointment for the Ultrasound treatment is just to see if he is a candidate and to discuss pros and cons and answer his questions. He said he will go to the appointment and discuss all of his concerns with them.

## 2019-02-28 ENCOUNTER — OFFICE VISIT (OUTPATIENT)
Dept: INTERNAL MEDICINE CLINIC | Age: 74
End: 2019-02-28

## 2019-02-28 VITALS
WEIGHT: 163.8 LBS | HEIGHT: 73 IN | DIASTOLIC BLOOD PRESSURE: 83 MMHG | BODY MASS INDEX: 21.71 KG/M2 | SYSTOLIC BLOOD PRESSURE: 126 MMHG | TEMPERATURE: 97.5 F | RESPIRATION RATE: 14 BRPM | HEART RATE: 72 BPM

## 2019-02-28 DIAGNOSIS — G20 PARKINSON'S DISEASE (TREMOR, STIFFNESS, SLOW MOTION, UNSTABLE POSTURE) (HCC): Primary | ICD-10-CM

## 2019-02-28 DIAGNOSIS — R63.4 WEIGHT LOSS: ICD-10-CM

## 2019-02-28 DIAGNOSIS — R53.82 CHRONIC FATIGUE: ICD-10-CM

## 2019-02-28 RX ORDER — CARBIDOPA AND LEVODOPA 25; 100 MG/1; MG/1
1 TABLET ORAL
COMMUNITY
End: 2019-03-21 | Stop reason: SDUPTHER

## 2019-02-28 RX ORDER — POLYETHYLENE GLYCOL 3350 17 G/17G
17 POWDER, FOR SOLUTION ORAL
COMMUNITY
End: 2020-06-09

## 2019-02-28 NOTE — PROGRESS NOTES
Subjective:   Arie Lundborg is a 76 y.o. male      Chief Complaint   Patient presents with    Follow-up        History of present illness:  Mr. Pascale Heredia returns in follow up. His fatigue has significantly improved since he stopped the Amantadine. His tremor is approximately the same and fairly prominent. It seems reasonably well controlled by sorting out his Sinemet to five times a day. Constipation is improved and he is no longer taking Miralax. He is eating better and his weight has improved considerably. His spirits seem significantly better as well. He has had no recent cardiorespiratory, GI or  symptoms. He is going to [facility] to consider other forms of therapy for his Parkinson's and he will let us know what transpires there. For now I think he is stable on his current medicines. He wants to avoid the Amantadine and I think that is a good idea since he has improved so much off of it. He continues follow up with neurology here as well. We will check him again in three months' time to make sure that his weight, etc., remains stable.         Patient Active Problem List    Diagnosis Date Noted    Parkinson's disease (tremor, stiffness, slow motion, unstable posture) ([address] 12/08/2017     Priority: 1 - One    Bowel obstruction ([address] 11/22/2018     Priority: 2 - Two    Constipation 11/21/2018     Priority: 2 - Two    Chronic fatigue 01/02/2019     Priority: 3 - Three    Weight loss 01/02/2019     Priority: 3 - Three    Compression fracture of lumbar spine, non-traumatic, sequela 10/10/2017     Priority: 3 - Three    Age-related osteoporosis with current pathological fracture 10/10/2017     Priority: 3 - Three    MVP (mitral valve prolapse) 11/19/2009     Priority: 4 - Four    Prostatism 11/19/2009     Priority: 4 - Four    Basal cell carcinoma of nose 02/04/2013     Priority: 5 - Five    Annual physical exam 10/10/2017      Past Surgical History:   Procedure Laterality Date    COLONOSCOPY N/A 2/7/2018    COLONOSCOPY performed by John Whittington MD at [facility] AMBULATORY OR    HX APPENDECTOMY  childhood    HX COLONOSCOPY        No Known Allergies   Family History   Problem Relation Age of Onset    Cancer Mother         colon      Social History     Socioeconomic History    Marital status:      Spouse name: Not on file    Number of children: Not on file    Years of education: Not on file    Highest education level: Not on file   Social Needs    Financial resource strain: Not on file    Food insecurity - worry: Not on file    Food insecurity - inability: Not on file   [employer] needs - medical: Not on file   [employer] needs - non-medical: Not on file   Occupational History    Not on file   Tobacco Use    Smoking status: Former Smoker    Smokeless tobacco: Never Used   Substance and Sexual Activity    Alcohol use: No    Drug use: No    Sexual activity: Not on file   Other Topics Concern    Not on file   Social History Narrative    ** Merged History Encounter **          Outpatient Medications Marked as Taking for the 2/28/19 encounter (Office Visit) with Mat Jean MD   Medication Sig Dispense Refill    carbidopa-levodopa (SINEMET)  mg per tablet Take 1 Tab by mouth five (5) times daily.  polyethylene glycol (MIRALAX) 17 gram packet Take 17 g by mouth daily as needed.  carbidopa-levodopa ER (SINEMET CR)  mg per tablet Take 1 Tab by mouth nightly. 30 Tab 2    cholecalciferol, VITAMIN D3, (VITAMIN D3) 5,000 unit tab tablet Take 5,000 Units by mouth two (2) times a week. Review of Systems              Constitutional:  He denies fever, weight loss, sweats or fatigue. HEENT:  No blurred or double vision, headache or dizziness. No difficulty with swallowing, taste, speech or smell. Respiratory:  No cough, wheezing or shortness of breath. No sputum production.   Cardiac:  Denies chest pain, palpitations, unexplained indigestion, syncope, edema, PND or orthopnea. GI:  No changes in bowel movements, no abdominal pain, no bloating, anorexia, nausea, vomiting or heartburn. :  No frequency or dysuria. Denies incontinence. Extremities:  No joint pain, stiffness or swelling. Skin:  No recent rashes or mole changes. Neurological:  Tremor and bradykinesia. Objective:     Vitals:    02/28/19 1006   BP: 126/83   Pulse: 72   Resp: 14   Temp: 97.5 °F (36.4 °C)   TempSrc: Oral   Weight: 163 lb 12.8 oz (74.3 kg)   Height: 6' 1\" (1.854 m)   PainSc:   0 - No pain       Body mass index is 21.61 kg/m². Physical Examination:              General Appearance:  Well-developed, well-nourished, no acute  distress. HEENT:     Ears:  The TMs and ear canals were clear. Eyes:  The pupillary responses were normal.  Extraocular muscle function intact. Lids and conjunctiva not injected. Neck:  Supple without thyromegaly or adenopathy. No JVD noted. Lungs:  Clear to auscultation and percussion. Cardiac:  Regular rate and rhythm without murmur. GI: nontender w/o mass. Normal BS's. Extremities:  No clubbing, cyanosis or edema. Skin:  No rash or unusual mole changes noted. Neurological: prominent hand tremorl. Assessment/Plan:   Impressions:      ICD-10-CM ICD-9-CM    1. Parkinson's disease (tremor, stiffness, slow motion, unstable posture) ([facility]) G20 332.0    2. Weight loss R63.4 783.21    3. Chronic fatigue R53.82 780.79         Plan:  1. Continue present meds  2. Discussed lifestyle modifications including exercise (at least a walking program). 3. Going to [facility] for possible nerve stim or US Rx        Follow-up Disposition:  Return in about 3 months (around 5/28/2019). No orders of the defined types were placed in this encounter.       Nataliya Zee MD

## 2019-02-28 NOTE — PROGRESS NOTES
Reviewed record in preparation for visit and have obtained necessary documentation. Identified pt with two pt identifiers(name and ). Chief Complaint   Patient presents with    Follow-up        Coordination of Care Questionnaire:  :     1) Have you been to an emergency room, urgent care clinic since your last visit? No     Hospitalized since your last visit? No             2) Have you seen or consulted any other health care providers outside of [address] [place] [address] since your last visit?  Yes Dr Betzy Santos 1/10/2019

## 2019-03-21 ENCOUNTER — OFFICE VISIT (OUTPATIENT)
Dept: NEUROLOGY | Age: 74
End: 2019-03-21

## 2019-03-21 VITALS
HEART RATE: 68 BPM | WEIGHT: 168 LBS | BODY MASS INDEX: 22.26 KG/M2 | OXYGEN SATURATION: 98 % | DIASTOLIC BLOOD PRESSURE: 86 MMHG | HEIGHT: 73 IN | SYSTOLIC BLOOD PRESSURE: 128 MMHG

## 2019-03-21 DIAGNOSIS — G24.9 DYSKINESIA: ICD-10-CM

## 2019-03-21 DIAGNOSIS — K59.03 DRUG-INDUCED CONSTIPATION: ICD-10-CM

## 2019-03-21 DIAGNOSIS — I95.1 ORTHOSTATIC HYPOTENSION: ICD-10-CM

## 2019-03-21 DIAGNOSIS — G20 PARKINSON DISEASE (HCC): Primary | ICD-10-CM

## 2019-03-21 RX ORDER — CARBIDOPA AND LEVODOPA 25; 100 MG/1; MG/1
1 TABLET, EXTENDED RELEASE ORAL DAILY
Qty: 30 TAB | Refills: 2 | Status: SHIPPED | OUTPATIENT
Start: 2019-03-21 | End: 2019-09-23 | Stop reason: SDUPTHER

## 2019-03-21 RX ORDER — AMANTADINE HYDROCHLORIDE 100 MG/1
TABLET ORAL
Qty: 30 TAB | Refills: 2 | Status: SHIPPED | OUTPATIENT
Start: 2019-03-21 | End: 2019-05-14 | Stop reason: SDUPTHER

## 2019-03-21 RX ORDER — CARBIDOPA AND LEVODOPA 25; 100 MG/1; MG/1
1 TABLET, EXTENDED RELEASE ORAL
Qty: 30 TAB | Refills: 2 | Status: SHIPPED | OUTPATIENT
Start: 2019-03-21 | End: 2019-03-21 | Stop reason: SDUPTHER

## 2019-03-21 RX ORDER — CARBIDOPA AND LEVODOPA 25; 100 MG/1; MG/1
1 TABLET ORAL
Qty: 450 TAB | Refills: 1 | Status: SHIPPED | OUTPATIENT
Start: 2019-03-21 | End: 2019-09-23 | Stop reason: SDUPTHER

## 2019-03-21 NOTE — PROGRESS NOTES
NEUROLOGY CLINIC NOTE Patient ID: Tamara aLkhani 
[id number] 
89 y.o. 
1945 Date of Visit:  March 21, 2019 Reason for Visit:  Parkinson's disease Chief Complaint Patient presents with  Follow-up Says he is doing very well. History of Present Illness:  
 
Patient Active Problem List  
 Diagnosis Date Noted  Chronic fatigue 01/02/2019 Priority: 3 - Three  Weight loss 01/02/2019 Priority: 3 - Three  Bowel obstruction ([address] 11/22/2018  Constipation 11/21/2018  Parkinson's disease (tremor, stiffness, slow motion, unstable posture) ([address] 12/08/2017  Annual physical exam 10/10/2017  Compression fracture of lumbar spine, non-traumatic, sequela 10/10/2017  Age-related osteoporosis with current pathological fracture 10/10/2017  Basal cell carcinoma of nose 02/04/2013  MVP (mitral valve prolapse) 11/19/2009  Prostatism 11/19/2009 Past Medical History:  
Diagnosis Date  Mitral valve disorders(424.0) 11/19/2009 4/3/17 followed by PCP no cardiologist  
 Parkinson disease ([address]  Prostatism 11/19/2009  
 Skin cancer Past Surgical History:  
Procedure Laterality Date  COLONOSCOPY N/A 2/7/2018 COLONOSCOPY performed by Shantelle Simeon MD at . [facility] HX APPENDECTOMY  childhood  HX COLONOSCOPY Prior to Admission medications Medication Sig Start Date End Date Taking? Authorizing Provider  
carbidopa-levodopa (SINEMET)  mg per tablet Take 1 Tab by mouth five (5) times daily. Yes Provider, Historical  
carbidopa-levodopa ER (SINEMET CR)  mg per tablet Take 1 Tab by mouth nightly. 1/10/19  Yes Brenda Costa MD  
cholecalciferol, VITAMIN D3, (VITAMIN D3) 5,000 unit tab tablet Take 5,000 Units by mouth two (2) times a week. Yes Provider, Historical  
polyethylene glycol (MIRALAX) 17 gram packet Take 17 g by mouth daily as needed. Provider, Historical  
 
No Known Allergies Social History Tobacco Use  Smoking status: Former Smoker  Smokeless tobacco: Never Used Substance Use Topics  Alcohol use: No  
  
Family History Problem Relation Age of Onset  Cancer Mother   
     colon Subjective:  
  
Brittany Zee is a 76 y.o. LHWM with history of PD diagnosed in 2014 and was previously being followed by Dr. Debora Mcgee who is here for further evaluation and management of his Parkinson's disease. Patient was admitted from [facility] ER last 11/21/2018 for small bowel obstruction and constipation. Patient apparently showed up in the emergency room because of worsening issues with his walking as well as development of abdominal pain radiating to his back. Patient endorses a history of chronic low back pain secondary to compression fractures. He apparently only has bowel movements 3 times a week and was even less this past week. Patient reports recent adjustment of his PD medication by Dr. Debora Mcgee. Patient however admits that he does not take the medication as prescribed. He may take some of the medication several times a week. He admits not to be taking any of PD medication daily as prescribed. He also does not take his BP medications at exact times. He reports he is going to physical therapy twice a week either boxing therapy or yoga. Denies any recent falls. In the ER, blood pressure was 135/99. Laboratory workup revealed hyponatremia with sodium of 131, elevated creatinine and decreased GFR. Increased lactic acid. CT of the abdomen revealed small bowel obstruction secondary to colonic fecal impaction. Patient was supposed to be taking amantadine 100 mg 3 times daily, Cogentin 2 mg 3 times daily, Artane 4 mg 3 times daily and Sinemet 25/100,  2 3 times daily.  
Neurological examination reveals masked faces, monotonous voice, mild bradykinesia, resting upper extremity tremors, significant postural instability, shuffling gait and decreased arm swing. No cogwheel rigidity. Findings consistent with Parkinson's disease. When seen the next day on 11/23/2018, patient was much improved. Significantly less resting tremors and more mobile. Patient was discharge 11/25/2018 on same medication regimen except Artane. Patient was seen by his new PCP 11/30/2018. Note mentions patient doing well from a Parkinson's standpoint. He is mobile with only moderate tremor. Patient was referred here for further management. Per patient and wife since then, patient has been having difficulty walking for the past few days. Patient has been unsteady and has had freezing spells. When asked if he is actually taking his Parkinson's medications as prescribed, patient admits to not taking them as he should. Since the last visit on 1/10/2019, patient was seen at the Parkinson's clinic at [facility] on 3/1/2019 by Dr. Colin Lopez and Dr. Juliana Alston. Note mentions patient is taking Sinemet every 3-3-1/2 hours 5-6 times per day. Patient goes to boxing 3 times a week as his formal therapy. He also does yoga once a week. No changes were made to his regimen. Apparently ultrasound treatment study is full and is not accepting new patients. Once approved by FDA then patient may be a candidate in the future. Also discussed DBS with the patient. Per patient, he continues to do well. He is more physically active. Reports occasional episodes when he bends over of back stiffness and walking changes when off periods occur which is not often. He is currently only taking Sinemet 25/100 mg 5 times a day. He did not try the Sinemet CR at night. Appetite is much better. No significant issues with constipation. Outside reports reviewed: office note Review of Systems: A comprehensive review of systems was performed: Musculoskeletal: positive for myalgia, weakness Neurological: positive for tremors, balance issues Objective: Visit Vitals /86 Pulse 68 Ht 6' 1\" (1.854 m) Wt 168 lb (76.2 kg) SpO2 98% BMI 22.16 kg/m² PHYSICAL EXAM: 
 
NEUROLOGICAL EXAM: 
  
Appearance: The patient is well developed, well nourished, provides a coherent history and is in no acute distress. Mental Status: Oriented to time, place and person. Fluent, no aphasia or dysarthria. Mask facies. Monotonous voice and decrease voice caliber today. Mood and affect appropriate. Cranial Nerves:   II - XII were intact with significantly less nicanor-buccal dyskinesia Motor:  5/5 strength. Normal bulk and tone. No cogwheel rigidity. Less bradykinesia. Reflexes:   Deep tendon reflexes 2+/4 and symmetrical. Toes downgoing. Sensory:   Normal to cold, pinprick and vibration. Gait:  Able to stand quickly from sitting. Wide based and slight unsteady. No shuffling but still less arm swing. Tremor:   Significant rest coarse tremors and more obvious when walking. Cerebellar:  Intact FTN/TAYLOR/HTS. Assessment:  
Parkinson's disease Orthostatic hypotension Dyskinesia Constipation Plan:  
Neurological examination reveals findings typically seen in Parkinson's disease. Still with less bradykinesia no cogwheel rigidity, no resting pill-rolling tremor and shuffling. Masked faces, decreased voice caliber, unsteady gait and decreased arm swing. However increase resting coarse tremors. Reminded about the need to take Parkinson's medication routinely at exact times and frequencies. For now patient is to continue taking carbidopa/levodopa 25/100 mg up to 5 times per day. Prescription was provided. Advised to try daytime dose of Sinemet CR 25/100 mg daily and see if tremors improve and less off time. Prescription was provided. Retrial of lower dose Amantadine 50 mg daily and up to 50 mg BID for the hand tremors. Prescription was provided. If fatigue and decrease appetite occurs, then stop the medication. Off of Artane and Benztropine.   Continue vitamin D supplementation. Continue to monitor. Reduction of his PD regimen is helping. If condition persists and he becomes symptomatic then we will start him on medications specifically for PD patients with orthostatic hypotension. Patient was advised to increase salt intake, increase fluids and wear support stockings. Patient is also having less oral buccal dyskinesias which was due to overmedication. Adjustment as above has helped. Fall precautions. Advised to use all necessary equipment to prevent from falling. With regards to his constipation which is likely PD related and medication induced, continue OTC regimen. All questions and concerns were answered.

## 2019-03-21 NOTE — PATIENT INSTRUCTIONS
Parkinson's Disease: Care Instructions Your Care Instructions Parkinson's disease can cause tremors, stiffness, and problems with movement. Severe or advanced cases can also cause problems with thinking. In Parkinson's disease, part of the brain cannot make enough dopamine, a chemical that helps control movement. Taking your medicines correctly and getting regular exercise may help you maintain your quality of life. There are many things that can cause Parkinson's disease symptoms, including some medicine, some toxins, and trauma to the head. The cause in most cases is not known. Follow-up care is a key part of your treatment and safety. Be sure to make and go to all appointments, and call your doctor if you are having problems. It's also a good idea to know your test results and keep a list of the medicines you take. How can you care for yourself at home? General care · Take your medicines exactly as prescribed. Call your doctor if you think you are having a problem with your medicine. · Make sure your home is safe: 
? Place furniture so that you have something to hold on to as you walk around the house. ? Use chairs that make it easier to sit down and stand up. ? Group the things you use most, such as reading glasses, keys, and the telephone, in one easy-to-reach place. ? Tack down rugs so that you do not trip. ? Put no-slip tape and handrails in the tub to prevent falls. · Use a cane, walker, or scooter if your doctor suggests it. · Keep up your normal activities as much as you can. · Find ways to manage stress, which can make symptoms worse. · Spend time with family and friends. Join a support group for people with Parkinson's disease if you want extra help. · Depression is common with this condition. Tell your doctor if you have trouble sleeping, are eating too much or are not hungry, or feel sad or tearful all the time. Depression can be treated with medicine and counseling. Diet and exercise · Eat a balanced diet. · If you are taking levodopa, do not eat protein at the same time you take your medicine. Levodopa may not work as well if you take it at the same time you eat protein. You can eat normal amounts of protein. Talk to your doctor if you have questions. · If you have problems swallowing, change how and what you eat: ? Try thick drinks, such as milk shakes. They are easier to swallow than other fluids. ? Do not eat foods that crumble easily. These can cause choking. ? Use a  to prepare food. Soft foods need less chewing. ? Eat small meals often so that you do not get tired from eating heavy meals. · Drink plenty of water and eat a high-fiber diet to prevent constipation. Parkinson'sand the medicines that treat itmay slow your intestines. · Get exercise on most days. Work with your doctor to set up a program of walking, swimming, or other exercise you are able to do. When should you call for help? Call your doctor now or seek immediate medical care if: 
  · You have a change in your symptoms.  
  · You develop other problems from your condition, such as: 
? Injury from a fall. ? Thinking or memory problems. ? A urinary tract infection (burning pain when urinating).  
 Watch closely for changes in your health, and be sure to contact your doctor if: 
  · You lose weight because of problems with eating.  
  · You want more information about your condition or your medicines. Where can you learn more? Go to http://adrian-mitchel.info/. Enter B022 in the search box to learn more about \"Parkinson's Disease: Care Instructions. \" Current as of: Christine 3, 2018 Content Version: 11.9 © 1416-5310 [employer]. Care instructions adapted under license by [employer] (which disclaims liability or warranty for this information).  If you have questions about a medical condition or this instruction, always ask your healthcare professional. [place] [address] any warranty or liability for your use of this information. Orthostatic Hypotension: Care Instructions Your Care Instructions Orthostatic hypotension is a quick drop in blood pressure. It happens when you get up from sitting or lying down. You may feel faint, lightheaded, or dizzy. When a person sits up or stands up, the body changes the way it pumps blood. This can slow the flow of blood to the brain for a very short time. And that can make you feel lightheaded. Many medicines can cause this problem, especially in older people. Lack of fluids (dehydration) or illnesses such as diabetes or heart disease also can cause it. Follow-up care is a key part of your treatment and safety. Be sure to make and go to all appointments, and call your doctor if you are having problems. It's also a good idea to know your test results and keep a list of the medicines you take. How can you care for yourself at home? · Tell your doctor about any problems you have with your medicines. · If your doctor prescribes medicine to help prevent a low blood pressure problem, take it exactly as prescribed. Call your doctor if you think you are having a problem with your medicine. · Drink plenty of fluids, enough so that your urine is light yellow or clear like water. Choose water and other caffeine-free clear liquids. If you have kidney, heart, or liver disease and have to limit fluids, talk with your doctor before you increase the amount of fluids you drink. · Limit or avoid alcohol and caffeine. · Get up slowly from bed or after sitting for a long time. If you are in bed, roll to your side and swing your legs over the edge of the bed and onto the floor. Push your body up to a sitting position. Wait for a while before you slowly stand up. If you are dizzy or lightheaded, sit or lie down. When should you call for help? Call 911 anytime you think you may need emergency care. For example, call if: 
  · You passed out (lost consciousness).  
 Watch closely for changes in your health, and be sure to contact your doctor if: 
  · You do not get better as expected. Where can you learn more? Go to http://adrian-mitchel.info/. Enter L566 in the search box to learn more about \"Orthostatic Hypotension: Care Instructions. \" Current as of: July 22, 2018 Content Version: 11.9 © 3304-4282 [employer]. Care instructions adapted under license by [employer] (which disclaims liability or warranty for this information). If you have questions about a medical condition or this instruction, always ask your healthcare professional. [address] any warranty or liability for your use of this information. Constipation: Care Instructions Your Care Instructions Constipation means that you have a hard time passing stools (bowel movements). People pass stools from 3 times a day to once every 3 days. What is normal for you may be different. Constipation may occur with pain in the rectum and cramping. The pain may get worse when you try to pass stools. Sometimes there are small amounts of bright red blood on toilet paper or the surface of stools. This is because of enlarged veins near the rectum (hemorrhoids). A few changes in your diet and lifestyle may help you avoid ongoing constipation. Your doctor may also prescribe medicine to help loosen your stool. Some medicines can cause constipation. These include pain medicines and antidepressants. Tell your doctor about all the medicines you take. Your doctor may want to make a medicine change to ease your symptoms. Follow-up care is a key part of your treatment and safety. Be sure to make and go to all appointments, and call your doctor if you are having problems.  It's also a good idea to know your test results and keep a list of the medicines you take. How can you care for yourself at home? · Drink plenty of fluids, enough so that your urine is light yellow or clear like water. If you have kidney, heart, or liver disease and have to limit fluids, talk with your doctor before you increase the amount of fluids you drink. · Include high-fiber foods in your diet each day. These include fruits, vegetables, beans, and whole grains. · Get at least 30 minutes of exercise on most days of the week. Walking is a good choice. You also may want to do other activities, such as running, swimming, cycling, or playing tennis or team sports. · Take a fiber supplement, such as Citrucel or Metamucil, every day. Read and follow all instructions on the label. · Schedule time each day for a bowel movement. A daily routine may help. Take your time having your bowel movement. · Support your feet with a small step stool when you sit on the toilet. This helps flex your hips and places your pelvis in a squatting position. · Your doctor may recommend an over-the-counter laxative to relieve your constipation. Examples are Milk of Magnesia and MiraLax. Read and follow all instructions on the label. Do not use laxatives on a long-term basis. When should you call for help? Call your doctor now or seek immediate medical care if: 
  · You have new or worse belly pain.  
  · You have new or worse nausea or vomiting.  
  · You have blood in your stools.  
 Watch closely for changes in your health, and be sure to contact your doctor if: 
  · Your constipation is getting worse.  
  · You do not get better as expected. Where can you learn more? Go to http://adrian-mitchel.info/. Enter 21  in the search box to learn more about \"Constipation: Care Instructions. \" Current as of: September 23, 2018 Content Version: 11.9 © 5255-7154 [employer].  Care instructions adapted under license by [address] (which disclaims liability or warranty for this information). If you have questions about a medical condition or this instruction, always ask your healthcare professional. [address] any warranty or liability for your use of this information.

## 2019-05-14 DIAGNOSIS — G20 PARKINSON DISEASE (HCC): ICD-10-CM

## 2019-05-14 RX ORDER — AMANTADINE HYDROCHLORIDE 100 MG/1
TABLET ORAL
Qty: 30 TAB | Refills: 0 | Status: SHIPPED | OUTPATIENT
Start: 2019-05-14 | End: 2019-09-23

## 2019-05-21 ENCOUNTER — OFFICE VISIT (OUTPATIENT)
Dept: NEUROLOGY | Age: 74
End: 2019-05-21

## 2019-05-21 VITALS
WEIGHT: 172 LBS | BODY MASS INDEX: 22.8 KG/M2 | DIASTOLIC BLOOD PRESSURE: 82 MMHG | HEART RATE: 71 BPM | OXYGEN SATURATION: 98 % | HEIGHT: 73 IN | SYSTOLIC BLOOD PRESSURE: 124 MMHG

## 2019-05-21 DIAGNOSIS — G20 PARKINSON DISEASE (HCC): Primary | ICD-10-CM

## 2019-05-21 DIAGNOSIS — K59.03 DRUG-INDUCED CONSTIPATION: ICD-10-CM

## 2019-05-21 DIAGNOSIS — G24.9 DYSKINESIA: ICD-10-CM

## 2019-05-21 DIAGNOSIS — I95.1 ORTHOSTATIC HYPOTENSION: ICD-10-CM

## 2019-05-21 NOTE — LETTER
5/31/19 Patient: Dolores Alcala YOB: 1945 Date of Visit: 5/21/2019 Viry Mccray MD 
[address] [id number] VIA In Basket Dear Viry Mccray MD, Thank you for referring Mr. Rudy Bruner to [facility] for evaluation. My notes for this consultation are attached. If you have questions, please do not hesitate to call me. I look forward to following your patient along with you. Sincerely, Zandra Laguna MD

## 2019-05-21 NOTE — PATIENT INSTRUCTIONS
A Healthy Lifestyle: Care Instructions  Your Care Instructions    A healthy lifestyle can help you feel good, stay at a healthy weight, and have plenty of energy for both work and play. A healthy lifestyle is something you can share with your whole family. A healthy lifestyle also can lower your risk for serious health problems, such as high blood pressure, heart disease, and diabetes. You can follow a few steps listed below to improve your health and the health of your family. Follow-up care is a key part of your treatment and safety. Be sure to make and go to all appointments, and call your doctor if you are having problems. It's also a good idea to know your test results and keep a list of the medicines you take. How can you care for yourself at home? · Do not eat too much sugar, fat, or fast foods. You can still have dessert and treats now and then. The goal is moderation. · Start small to improve your eating habits. Pay attention to portion sizes, drink less juice and soda pop, and eat more fruits and vegetables. ? Eat a healthy amount of food. A 3-ounce serving of meat, for example, is about the size of a deck of cards. Fill the rest of your plate with vegetables and whole grains. ? Limit the amount of soda and sports drinks you have every day. Drink more water when you are thirsty. ? Eat at least 5 servings of fruits and vegetables every day. It may seem like a lot, but it is not hard to reach this goal. A serving or helping is 1 piece of fruit, 1 cup of vegetables, or 2 cups of leafy, raw vegetables. Have an apple or some carrot sticks as an afternoon snack instead of a candy bar. Try to have fruits and/or vegetables at every meal.  · Make exercise part of your daily routine. You may want to start with simple activities, such as walking, bicycling, or slow swimming. Try to be active 30 to 60 minutes every day. You do not need to do all 30 to 60 minutes all at once.  For example, you can exercise 3 times a day for 10 or 20 minutes. Moderate exercise is safe for most people, but it is always a good idea to talk to your doctor before starting an exercise program.  · Keep moving. [place] Limb the lawn, work in the garden, or [facility]. Take the stairs instead of the elevator at work. · If you smoke, quit. People who smoke have an increased risk for heart attack, stroke, cancer, and other lung illnesses. Quitting is hard, but there are ways to boost your chance of quitting tobacco for good. ? Use nicotine gum, patches, or lozenges. ? Ask your doctor about stop-smoking programs and medicines. ? Keep trying. In addition to reducing your risk of diseases in the future, you will notice some benefits soon after you stop using tobacco. If you have shortness of breath or asthma symptoms, they will likely get better within a few weeks after you quit. · Limit how much alcohol you drink. Moderate amounts of alcohol (up to 2 drinks a day for men, 1 drink a day for women) are okay. But drinking too much can lead to liver problems, high blood pressure, and other health problems. Family health  If you have a family, there are many things you can do together to improve your health. · Eat meals together as a family as often as possible. · Eat healthy foods. This includes fruits, vegetables, lean meats and dairy, and whole grains. · Include your family in your fitness plan. Most people think of activities such as jogging or tennis as the way to fitness, but there are many ways you and your family can be more active. Anything that makes you breathe hard and gets your heart pumping is exercise. Here are some tips:  ? Walk to do errands or to take your child to school or the bus.  ? Go for a family bike ride after dinner instead of watching TV. Where can you learn more? Go to http://adrian-mitchel.info/. Enter V012 in the search box to learn more about \"A Healthy Lifestyle: Care Instructions. \"  Current as of: September 11, 2018  Content Version: 11.9  © 0191-7474 Over [employer], [employer]. Care instructions adapted under license by "[employer] (which disclaims liability or warranty for this information). If you have questions about a medical condition or this instruction, always ask your healthcare professional. [address] any warranty or liability for your use of this information. Parkinson's Disease: Care Instructions  Your Care Instructions  Parkinson's disease can cause tremors, stiffness, and problems with movement. Severe or advanced cases can also cause problems with thinking. In Parkinson's disease, part of the brain cannot make enough dopamine, a chemical that helps control movement. Taking your medicines correctly and getting regular exercise may help you maintain your quality of life. There are many things that can cause Parkinson's disease symptoms, including some medicine, some toxins, and trauma to the head. The cause in most cases is not known. Follow-up care is a key part of your treatment and safety. Be sure to make and go to all appointments, and call your doctor if you are having problems. It's also a good idea to know your test results and keep a list of the medicines you take. How can you care for yourself at home? General care  · Take your medicines exactly as prescribed. Call your doctor if you think you are having a problem with your medicine. · Make sure your home is safe:  ? Place furniture so that you have something to hold on to as you walk around the house. ? Use chairs that make it easier to sit down and stand up. ? Group the things you use most, such as reading glasses, keys, and the telephone, in one easy-to-reach place. ? Tack down rugs so that you do not trip. ? Put no-slip tape and handrails in the tub to prevent falls. · Use a cane, walker, or scooter if your doctor suggests it.   · Keep up your normal activities as much as you can.  · Find ways to manage stress, which can make symptoms worse. · Spend time with family and friends. Join a support group for people with Parkinson's disease if you want extra help. · Depression is common with this condition. Tell your doctor if you have trouble sleeping, are eating too much or are not hungry, or feel sad or tearful all the time. Depression can be treated with medicine and counseling. Diet and exercise  · Eat a balanced diet. · If you are taking levodopa, do not eat protein at the same time you take your medicine. Levodopa may not work as well if you take it at the same time you eat protein. You can eat normal amounts of protein. Talk to your doctor if you have questions. · If you have problems swallowing, change how and what you eat:  ? Try thick drinks, such as milk shakes. They are easier to swallow than other fluids. ? Do not eat foods that crumble easily. These can cause choking. ? Use a  to prepare food. Soft foods need less chewing. ? Eat small meals often so that you do not get tired from eating heavy meals. · Drink plenty of water and eat a high-fiber diet to prevent constipation. Parkinson's--and the medicines that treat it--may slow your intestines. · Get exercise on most days. Work with your doctor to set up a program of walking, swimming, or other exercise you are able to do. When should you call for help? Call your doctor now or seek immediate medical care if:    · You have a change in your symptoms.     · You develop other problems from your condition, such as:  ? Injury from a fall. ? Thinking or memory problems. ? A urinary tract infection (burning pain when urinating).    Watch closely for changes in your health, and be sure to contact your doctor if:    · You lose weight because of problems with eating.     · You want more information about your condition or your medicines. Where can you learn more?   Go to http://adrian-mitchel.info/. Enter P966 in the search box to learn more about \"Parkinson's Disease: Care Instructions. \"  Current as of: Christine 3, 2018  Content Version: 11.9  © 5508-1359 [employer]. Care instructions adapted under license by [employer] (which disclaims liability or warranty for this information). If you have questions about a medical condition or this instruction, always ask your healthcare professional. [address] any warranty or liability for your use of this information.

## 2019-05-21 NOTE — PROGRESS NOTES
NEUROLOGY CLINIC NOTE    Patient ID:  Alexsander Georges  [id number]  24 y.o.  1945    Date of Visit:  May 21, 2019    Reason for Visit:  Parkinson's disease  Chief Complaint   Patient presents with    Follow-up    Tremors       History of Present Illness:     Patient Active Problem List    Diagnosis Date Noted    Chronic fatigue 01/02/2019     Priority: 3 - Three    Weight loss 01/02/2019     Priority: 3 - Three    Bowel obstruction ([address] 11/22/2018    Constipation 11/21/2018    Parkinson's disease (tremor, stiffness, slow motion, unstable posture) ([address] 12/08/2017    Annual physical exam 10/10/2017    Compression fracture of lumbar spine, non-traumatic, sequela 10/10/2017    Age-related osteoporosis with current pathological fracture 10/10/2017    Basal cell carcinoma of nose 02/04/2013    MVP (mitral valve prolapse) 11/19/2009    Prostatism 11/19/2009     Past Medical History:   Diagnosis Date    Mitral valve disorders(424.0) 11/19/2009    4/3/17 followed by PCP no cardiologist    Parkinson disease [address] [address]     Prostatism 11/19/2009    Skin cancer       Past Surgical History:   Procedure Laterality Date    COLONOSCOPY N/A 2/7/2018    COLONOSCOPY performed by Leslie Charles MD at [facility] 57 HX APPENDECTOMY  childhood    HX COLONOSCOPY        Prior to Admission medications    Medication Sig Start Date End Date Taking? Authorizing Provider   carbidopa-levodopa (SINEMET)  mg per tablet Take 1 Tab by mouth five (5) times daily. 3/21/19  Yes Lalito Heard MD   carbidopa-levodopa ER (SINEMET CR)  mg per tablet Take 1 Tab by mouth daily. 3/21/19  Yes Lalito Heard MD   polyethylene glycol (MIRALAX) 17 gram packet Take 17 g by mouth daily as needed. Yes Provider, Historical   cholecalciferol, VITAMIN D3, (VITAMIN D3) 5,000 unit tab tablet Take 5,000 Units by mouth two (2) times a week.    Yes Provider, Historical   amantadine HCl (SYMMETREL) 100 mg tablet TAKE 1/2 TABLETS BY MOUTH TWICE DAILY 5/14/19   Molly Caballero MD     No Known Allergies   Social History     Tobacco Use    Smoking status: Former Smoker    Smokeless tobacco: Never Used   Substance Use Topics    Alcohol use: No      Family History   Problem Relation Age of Onset    Cancer Mother         colon        Subjective:      Leonard Hoffman is a 76 y.o. LHWM with history of PD diagnosed in 2014 and was previously being followed by Dr. Elizabeth Tilley who is here for further evaluation and management of his Parkinson's disease. Patient was admitted from [facility] ER last 11/21/2018 for small bowel obstruction and constipation. Patient apparently showed up in the emergency room because of worsening issues with his walking as well as development of abdominal pain radiating to his back. Patient endorses a history of chronic low back pain secondary to compression fractures. He apparently only has bowel movements 3 times a week and was even less this past week. Patient reports recent adjustment of his PD medication by Dr. Elizabeth Tilley. Patient however admits that he does not take the medication as prescribed. He may take some of the medication several times a week. He admits not to be taking any of PD medication daily as prescribed. He also does not take his BP medications at exact times. He reports he is going to physical therapy twice a week either boxing therapy or yoga. Denies any recent falls. In the ER, blood pressure was 135/99. Laboratory workup revealed hyponatremia with sodium of 131, elevated creatinine and decreased GFR. Increased lactic acid. CT of the abdomen revealed small bowel obstruction secondary to colonic fecal impaction. Patient was supposed to be taking amantadine 100 mg 3 times daily, Cogentin 2 mg 3 times daily, Artane 4 mg 3 times daily and Sinemet 25/100,  2 3 times daily.   Neurological examination reveals masked faces, monotonous voice, mild bradykinesia, resting upper extremity tremors, significant postural instability, shuffling gait and decreased arm swing. No cogwheel rigidity. Findings consistent with Parkinson's disease. When seen the next day on 11/23/2018, patient was much improved. Significantly less resting tremors and more mobile. Patient was discharge 11/25/2018 on same medication regimen except Artane. Patient was seen by his new PCP 11/30/2018. Note mentions patient doing well from a Parkinson's standpoint. He is mobile with only moderate tremor. Patient was referred here for further management. Per patient and wife since then, patient has been having difficulty walking for the past few days. Patient has been unsteady and has had freezing spells. When asked if he is actually taking his Parkinson's medications as prescribed, patient admits to not taking them as he should. Patient was seen at the Parkinson's clinic at [facility] on 3/1/2019 by Dr. Kiana Montiel and Dr. Dequan Barrett. Note mentions patient is taking Sinemet every 3-3-1/2 hours 5-6 times per day. Patient goes to boxing 3 times a week as his formal therapy. He also does yoga once a week. No changes were made to his regimen. Apparently ultrasound treatment study is full and is not accepting new patients. Once approved by FDA then patient may be a candidate in the future. Also discussed DBS with the patient. Since the last visit on 3/21/2019, patient reports no worsening of his tremors and PD symptoms. He is currently taking Sinemet 25/100 mg 5 times a day. He did not try the Sinemet CR in the morning. He was restarted on lower dose of Amantadine and is now taking 50 mg BID. Denies any side effect. Patient continues to do boxing 3 times a week as his formal therapy. He also does yoga once a week. Appetite continues to be better. No significant issues with constipation.     Outside reports reviewed: none    Review of Systems:    A comprehensive review of systems was performed: Neurological: positive for tremors, balance issues    Objective:     Visit Vitals  /82   Pulse 71   Ht 6' 1\" (1.854 m)   Wt 172 lb (78 kg)   SpO2 98%   BMI 22.69 kg/m²       PHYSICAL EXAM:    NEUROLOGICAL EXAM:     Appearance: The patient is well developed, well nourished, provides a coherent history and is in no acute distress. Mental Status: Oriented to time, place and person. Fluent, no aphasia or dysarthria. Mask facies. Monotonous voice and decrease voice caliber today. Mood and affect appropriate. Cranial Nerves:   II - XII were intact with significantly less nicanor-buccal dyskinesia   Motor:  5/5 strength. Normal bulk and tone. No cogwheel rigidity. Less bradykinesia. Reflexes:   Deep tendon reflexes 2+/4 and symmetrical. Toes downgoing. Sensory:   Normal to cold and vibration. Gait:  Able to stand quickly from sitting. Wide based and slight unsteady. No shuffling but still less arm swing. Tremor:   Less resting coarse hand tremors but more obvious when walking. Cerebellar:  Intact FTN/TAYLOR/HTS. Assessment:   Parkinson's disease  Orthostatic hypotension  Dyskinesia  Constipation    Plan:   Neurological examination is unchanged. It reveals findings typically seen in Parkinson's disease. Still with less bradykinesia no cogwheel rigidity, no resting pill-rolling tremor and shuffling. Masked faces, decreased voice caliber, unsteady gait and decreased arm swing. Less intense resting coarse tremors. Continue carbidopa/levodopa 25/100 mg up to 5 times per day. Advised again to try daytime dose of Sinemet CR 25/100 mg daily and see if tremors improve and less off time. Advised to increase Amantadine 100 mg BID for the hand tremors. If fatigue and decrease appetite occurs, then lower the medication back to previous dosing. Off of Artane and Benztropine. Continue vitamin D supplementation. Continue to monitor. Reduction of his PD regimen is helping.   If condition worsens and becomes symptomatic then we will start him on medications specifically for PD patients with orthostatic hypotension. Patient was encouraged to continue to increase salt intake, increase fluids and wear support stockings. Patient is also having less oral buccal dyskinesias which was due to overmedication. Adjustment as above has helped. Fall precautions. Advised to use all necessary equipment to prevent from falling. With regards to his constipation which is likely PD related and medication induced, continue OTC regimen. All questions and concerns were answered.

## 2019-05-30 ENCOUNTER — OFFICE VISIT (OUTPATIENT)
Dept: INTERNAL MEDICINE CLINIC | Age: 74
End: 2019-05-30

## 2019-05-30 VITALS
DIASTOLIC BLOOD PRESSURE: 80 MMHG | OXYGEN SATURATION: 96 % | WEIGHT: 171 LBS | RESPIRATION RATE: 20 BRPM | BODY MASS INDEX: 22.66 KG/M2 | HEART RATE: 68 BPM | HEIGHT: 73 IN | SYSTOLIC BLOOD PRESSURE: 112 MMHG | TEMPERATURE: 97.7 F

## 2019-05-30 DIAGNOSIS — R53.82 CHRONIC FATIGUE: ICD-10-CM

## 2019-05-30 DIAGNOSIS — R63.4 WEIGHT LOSS: ICD-10-CM

## 2019-05-30 DIAGNOSIS — G20 PARKINSON'S DISEASE (TREMOR, STIFFNESS, SLOW MOTION, UNSTABLE POSTURE) (HCC): ICD-10-CM

## 2019-05-30 DIAGNOSIS — Z23 ENCOUNTER FOR IMMUNIZATION: Primary | ICD-10-CM

## 2019-05-30 LAB
ERYTHROCYTE [DISTWIDTH] IN BLOOD BY AUTOMATED COUNT: 12.3 %
HCT VFR BLD AUTO: 43.8 % (ref 37–51)
HGB BLD-MCNC: 14.8 G/DL (ref 12–18)
LYMPHOCYTES ABSOLUTE: 1.7 K/UL (ref 0.6–4.1)
LYMPHOCYTES NFR BLD: 26.2 % (ref 10–58.5)
MCH RBC QN AUTO: 33.1 PG (ref 26–32)
MCHC RBC AUTO-ENTMCNC: 33.8 G/DL (ref 30–36)
MCV RBC AUTO: 97.9 FL (ref 80–97)
MONOCYTES ABS-DIF,2141: 0.5 K/UL (ref 0–1.8)
MONOCYTES NFR BLD: 7.8 % (ref 0.1–24)
NEUTROPHILS # BLD: 66 % (ref 37–92)
NEUTROPHILS ABS,2156: 4.3 K/UL (ref 2–7.8)
PLATELET # BLD AUTO: 195 K/UL (ref 140–440)
PMV BLD AUTO: 8 FL
RBC # BLD AUTO: 4.47 M/UL (ref 4.2–6.3)
WBC # BLD AUTO: 6.5 K/UL (ref 4.1–10.9)

## 2019-05-30 NOTE — PROGRESS NOTES
Subjective:   Anabel Terrell is a 76 y.o. male      Chief Complaint   Patient presents with    Follow-up        History of present illness:  Mr. Burgess Sandifer returns in follow up. His major problem is Parkinson's disease. He saw his neurologist recently, who restarted him Amantadine and he is now up to 100 mg twice a day without the side effects he was having before. I do not see that it has altered his tremor though. It is noted that should he have side effects from the Amantadine, or it not work, that he could go back to taking his Sinemet five times a day, as he is already doing now, but add in extended release capsule as a sixth dose every day. He has not done that as yet, though I have left that in the medication section. He does note some ease of fatigue. I think it is mostly related to his Parkinson's. His weight loss seems to have stabilized and actually to have improved. His blood pressure is excellent today. He still has some issues with constipation at times. Overall I think he is quite stable and his major problem is simply that of the Parkinson's disease. Once we are able to get his Parkinson's under a bit better control and his constipation resolved, he has not had as much problem with the weight loss, etc.  We will screen him with some labs today. He will be due his annual examination in October and we will schedule that appointment as well. He will see us in the meantime if anything else arises.         Patient Active Problem List    Diagnosis Date Noted    Parkinson's disease (tremor, stiffness, slow motion, unstable posture) ([facility] [address] 12/08/2017     Priority: 1 - One    Bowel obstruction ([facility] [address] 11/22/2018     Priority: 2 - Two    Constipation 11/21/2018     Priority: 2 - Two    Chronic fatigue 01/02/2019     Priority: 3 - Three    Weight loss 01/02/2019     Priority: 3 - Three    Compression fracture of lumbar spine, non-traumatic, sequela 10/10/2017     Priority: 3 - Three    Age-related osteoporosis with current pathological fracture 10/10/2017     Priority: 3 - Three    MVP (mitral valve prolapse) 11/19/2009     Priority: 4 - Four    Prostatism 11/19/2009     Priority: 4 - Four    Basal cell carcinoma of nose 02/04/2013     Priority: 5 - Five    Annual physical exam 10/10/2017      Past Surgical History:   Procedure Laterality Date    COLONOSCOPY N/A 2/7/2018    COLONOSCOPY performed by Mark Bain MD at [facility] AMBULATORY OR    HX APPENDECTOMY  childhood    HX COLONOSCOPY        No Known Allergies   Family History   Problem Relation Age of Onset    Cancer Mother         colon      Social History     Socioeconomic History    Marital status:      Spouse name: Not on file    Number of children: Not on file    Years of education: Not on file    Highest education level: Not on file   Occupational History    Not on file   Social Needs    Financial resource strain: Not on file    Food insecurity:     Worry: Not on file     Inability: Not on file    Transportation needs:     Medical: Not on file     Non-medical: Not on file   Tobacco Use    Smoking status: Former Smoker    Smokeless tobacco: Never Used   Substance and Sexual Activity    Alcohol use: No    Drug use: No    Sexual activity: Not on file   Lifestyle    Physical activity:     Days per week: Not on file     Minutes per session: Not on file    Stress: Not on file   Relationships    Social connections:     Talks on phone: Not on file     Gets together: Not on file     Attends Yazidi service: Not on file     Active member of club or organization: Not on file     Attends meetings of clubs or organizations: Not on file     Relationship status: Not on file    Intimate partner violence:     Fear of current or ex partner: Not on file     Emotionally abused: Not on file     Physically abused: Not on file     Forced sexual activity: Not on file   Other Topics Concern    Not on file   Social History Narrative    ** Merged History Encounter **          Outpatient Medications Marked as Taking for the 5/30/19 encounter (Office Visit) with Loki Meehan MD   Medication Sig Dispense Refill    varicella-zoster recombinant, PF, (SHINGRIX, PF,) 50 mcg/0.5 mL susr injection 0.5 mL by IntraMUSCular route once for 1 dose. 0.5 mL 1    amantadine HCl (SYMMETREL) 100 mg tablet TAKE 1/2 TABLETS BY MOUTH TWICE DAILY (Patient taking differently: 100 mg two (2) times a day. TAKE 1/2 TABLETS BY MOUTH TWICE DAILY) 30 Tab 0    carbidopa-levodopa (SINEMET)  mg per tablet Take 1 Tab by mouth five (5) times daily. 450 Tab 1    carbidopa-levodopa ER (SINEMET CR)  mg per tablet Take 1 Tab by mouth daily. 30 Tab 2    polyethylene glycol (MIRALAX) 17 gram packet Take 17 g by mouth daily as needed.  cholecalciferol, VITAMIN D3, (VITAMIN D3) 5,000 unit tab tablet Take 5,000 Units by mouth two (2) times a week. Review of Systems              Constitutional:  He denies fever, weight loss, sweats or fatigue. HEENT:  No blurred or double vision, headache or dizziness. No difficulty with swallowing, taste, speech or smell. Respiratory:  No cough, wheezing or shortness of breath. No sputum production. Cardiac:  Denies chest pain, palpitations, unexplained indigestion, syncope, edema, PND or orthopnea. GI:  No changes in bowel movements, no abdominal pain, no bloating, anorexia, nausea, vomiting or heartburn. :  No frequency or dysuria. Denies incontinence. Extremities:  No joint pain, stiffness or swelling. Skin:  No recent rashes or mole changes. Neurological:  No numbness, tingling, burning paresthesias or loss of motor strength. No syncope, dizziness, frequent headaches or memory loss.       Objective:     Vitals:    05/30/19 1055   BP: 112/80   Pulse: 68   Resp: 20   Temp: 97.7 °F (36.5 °C)   TempSrc: Oral   SpO2: 96%   Weight: 171 lb (77.6 kg)   Height: 6' 1\" (1.854 m)   PainSc:   0 - No pain       Body mass index is 22.56 kg/m². Physical Examination:              General Appearance:  Well-developed, well-nourished, no acute  distress. HEENT:     Ears:  The TMs and ear canals were clear. Eyes:  The pupillary responses were normal.  Extraocular muscle function intact. Lids and conjunctiva not injected. Neck:  Supple without thyromegaly or adenopathy. No JVD noted. Lungs:  Clear to auscultation and percussion. Cardiac:  Regular rate and rhythm without murmur. GI: nontender w/o mass. Normal BS's. Extremities:  No clubbing, cyanosis or edema. Skin:  No rash or unusual mole changes noted. Neurological:  Grossly normal.            Assessment/Plan:   Impressions:      ICD-10-CM ICD-9-CM    1. Encounter for immunization Z23 V03.89 TETANUS, DIPHTHERIA TOXOIDS AND ACELLULAR PERTUSSIS VACCINE (TDAP), IN INDIVIDS. >=7, IM   2. Parkinson's disease (tremor, stiffness, slow motion, unstable posture) ([facility]) J21 123.0 METABOLIC PANEL, BASIC   3. Chronic fatigue C04.17 428.49 METABOLIC PANEL, BASIC   4. Weight loss R63.4 783.21 CBC WITH AUTOMATED DIFF      METABOLIC PANEL, BASIC        Plan:  1. Continue present meds  2. Discussed lifestyle modifications including Na restriction, low carb/fat diet, weight reduction and exercise (at least a walking program). Follow-up and Dispositions    · Return in about 4 months (around 2019) for CPE. Orders Placed This Encounter    Tetanus, diphtheria toxoids and acellular pertussis (TDAP) vaccine, in individuals >=7 years, IM    CBC WITH AUTOMATED DIFF ([employer])    METABOLIC PANEL, BASIC    varicella-zoster recombinant, PF, (SHINGRIX, PF,) 50 mcg/0.5 mL susr injection     Si.5 mL by IntraMUSCular route once for 1 dose.      Dispense:  0.5 mL     Refill:  1       Destiny Zambrano MD

## 2019-05-30 NOTE — PROGRESS NOTES
After obtaining consent, and per verbal order from Dr. Osman Hopkins , patient received TDAP vaccine given by Dominga Palma LPN in Right Deltoid. TDAP Vaccine 0.5 mL IM now. Requested patient remain in the office 15 minutes post injection. Patient tolerated injection well with no adverse effects. VIS given.

## 2019-05-30 NOTE — PROGRESS NOTES
Reviewed record in preparation for visit and have obtained necessary documentation. Identified pt with two pt identifiers(name and ). Chief Complaint   Patient presents with    Follow-up        Coordination of Care Questionnaire:  :     1) Have you been to an emergency room, urgent care clinic since your last visit? No     Hospitalized since your last visit? No               2) Have you seen or consulted any other health care providers outside of [address] [place] [address] since your last visit?  Yes dr Shavonne Adames

## 2019-05-31 LAB
BUN SERPL-MCNC: 19 MG/DL (ref 8–27)
BUN/CREAT SERPL: 17 (ref 10–24)
CALCIUM SERPL-MCNC: 9 MG/DL (ref 8.6–10.2)
CHLORIDE SERPL-SCNC: 105 MMOL/L (ref 96–106)
CO2 SERPL-SCNC: 26 MMOL/L (ref 20–29)
CREAT SERPL-MCNC: 1.11 MG/DL (ref 0.76–1.27)
GLUCOSE SERPL-MCNC: 61 MG/DL (ref 65–99)
POTASSIUM SERPL-SCNC: 4.4 MMOL/L (ref 3.5–5.2)
SODIUM SERPL-SCNC: 143 MMOL/L (ref 134–144)

## 2019-05-31 NOTE — PROGRESS NOTES
Hgb and WBC normal.  Blood sugar, liver and kidney function normal.  No change in medications or treatment. Doing well. No change in medications or treatment.

## 2019-07-02 ENCOUNTER — OFFICE VISIT (OUTPATIENT)
Dept: INTERNAL MEDICINE CLINIC | Age: 74
End: 2019-07-02

## 2019-07-02 VITALS
OXYGEN SATURATION: 92 % | DIASTOLIC BLOOD PRESSURE: 67 MMHG | HEART RATE: 71 BPM | SYSTOLIC BLOOD PRESSURE: 103 MMHG | WEIGHT: 173.2 LBS | RESPIRATION RATE: 20 BRPM | BODY MASS INDEX: 22.95 KG/M2 | TEMPERATURE: 98.2 F | HEIGHT: 73 IN

## 2019-07-02 DIAGNOSIS — M54.32 SCIATICA OF LEFT SIDE: ICD-10-CM

## 2019-07-02 DIAGNOSIS — M25.552 LEFT HIP PAIN: Primary | ICD-10-CM

## 2019-07-02 RX ORDER — PREDNISONE 10 MG/1
10 TABLET ORAL SEE ADMIN INSTRUCTIONS
Qty: 21 TAB | Refills: 0 | Status: SHIPPED | OUTPATIENT
Start: 2019-07-02 | End: 2019-09-23

## 2019-07-02 NOTE — PROGRESS NOTES
Subjective:   Shadia Tolbert is a 76 y.o. male      Chief Complaint   Patient presents with    Hip Pain     left        History of present illness:     Mr. Mary Beth Trevino comes to the office today complaining of left hip pain, which is predominately in his buttock area. There is no real radiation of the pain. He denies significant back pain. He does have a previous history of multiple partial compression fractures in his back from several years ago. He recalls no antecedent trauma, other than the fact that he has been swimming more recently. He denies any fevers or chills, bowel or bladder dysfunction. X-rays of the left hip are relatively unremarkable. X-rays of his lumbosacral spine reveal the compression fractures and degenerative changes. I suspect most of his problem relates to nerve impingement in his low back area. We are going to try a Prednisone pack 10 mg usual tapering dose to see how he responds. If he does not improve, he might need an MRI or referral for physical therapy.            Patient Active Problem List    Diagnosis Date Noted    Parkinson's disease (tremor, stiffness, slow motion, unstable posture) ([address] 12/08/2017     Priority: 1 - One    Bowel obstruction ([address] 11/22/2018     Priority: 2 - Two    Constipation 11/21/2018     Priority: 2 - Two    Chronic fatigue 01/02/2019     Priority: 3 - Three    Weight loss 01/02/2019     Priority: 3 - Three    Compression fracture of lumbar spine, non-traumatic, sequela 10/10/2017     Priority: 3 - Three    Age-related osteoporosis with current pathological fracture 10/10/2017     Priority: 3 - Three    MVP (mitral valve prolapse) 11/19/2009     Priority: 4 - Four    Prostatism 11/19/2009     Priority: 4 - Four    Basal cell carcinoma of nose 02/04/2013     Priority: 5 - Five    Left hip pain 07/02/2019    Sciatica of left side 07/02/2019    Annual physical exam 10/10/2017      Past Surgical History:   Procedure Laterality Date    COLONOSCOPY N/A 2/7/2018    COL[facility] performed [facility]    HX APPENDECTOMY  childhood    HX COLONOSCOPY        No Known Allergies   Family History   Problem Relation Age of Onset    Cancer Mother         colon      Social History     Socioeconomic History    Marital status:      Spouse name: Not on file    Number of children: Not on file    Years of education: Not on file    Highest education level: Not on file   Occupational History    Not on file   Social Needs    Financial resource strain: Not on file    Food insecurity:     Worry: Not on file     Inability: Not on file    Transportation needs:     Medical: Not on file     Non-medical: Not on file   Tobacco Use    Smoking status: Former Smoker    Smokeless tobacco: Never Used   Substance and Sexual Activity    Alcohol use: No    Drug use: No    Sexual activity: Not on file   Lifestyle    Physical activity:     Days per week: Not on file     Minutes per session: Not on file    Stress: Not on file   Relationships    Social connections:     Talks on phone: Not on file     Gets together: Not on file     Attends Restorationism service: Not on file     Active member of club or organization: Not on file     Attends meetings of clubs or organizations: Not on file     Relationship status: Not on file    Intimate partner violence:     Fear of current or ex partner: Not on file     Emotionally abused: Not on file     Physically abused: Not on file     Forced sexual activity: Not on file   Other Topics Concern    Not on file   Social History Narrative    ** Merged History Encounter **          Outpatient Medications Marked as Taking for the 7/2/19 encounter (Office Visit) with Nirmal Arriaga MD   Medication Sig Dispense Refill    predniSONE (STERAPRED DS) 10 mg dose pack Take 1 Tab by mouth See Admin Instructions.  See administration instruction per 10mg dose pack 21 Tab 0    carbidopa-levodopa (SINEMET)  mg per tablet Take 1 Tab by mouth five (5) times daily. 450 Tab 1    carbidopa-levodopa ER (SINEMET CR)  mg per tablet Take 1 Tab by mouth daily. 30 Tab 2    polyethylene glycol (MIRALAX) 17 gram packet Take 17 g by mouth daily as needed.  cholecalciferol, VITAMIN D3, (VITAMIN D3) 5,000 unit tab tablet Take 5,000 Units by mouth two (2) times a week. Review of Systems              Constitutional:  He denies fever, weight loss, sweats or fatigue. HEENT:  No blurred or double vision, headache or dizziness. No difficulty with swallowing, taste, speech or smell. Respiratory:  No cough, wheezing or shortness of breath. No sputum production. Cardiac:  Denies chest pain, palpitations, unexplained indigestion, syncope, edema, PND or orthopnea. GI:  No changes in bowel movements, no abdominal pain, no bloating, anorexia, nausea, vomiting or heartburn. :  No frequency or dysuria. Denies incontinence. Extremities:  Left hip/sciatic painSkin:  No recent rashes or mole changes. Neurological: parkinson's sxs    Objective:     Vitals:    07/02/19 1439   BP: 103/67   Pulse: 71   Resp: 20   Temp: 98.2 °F (36.8 °C)   TempSrc: Oral   SpO2: 92%   Weight: 173 lb 3.2 oz (78.6 kg)   Height: 6' 1\" (1.854 m)   PainSc:   7   PainLoc: Hip       Body mass index is 22.85 kg/m². Physical Examination:              General Appearance:  Well-developed, well-nourished, no acute  distress. HEENT:     Ears:  The TMs and ear canals were clear. Eyes:  The pupillary responses were normal.  Extraocular muscle function intact. Lids and conjunctiva not injected. Neck:  Supple without thyromegaly or adenopathy. No JVD noted. Lungs:  Clear to auscultation and percussion. Cardiac:  Regular rate and rhythm without murmur. GI: nontender w/o mass. Normal BS's. Extremities:  No clubbing, cyanosis or edema. Skin:  No rash or unusual mole changes noted.     Neurological:  Tremor and gait disturbance c/e Parkinson's. Assessment/Plan:   Impressions:      ICD-10-CM ICD-9-CM    1. Left hip pain M25.552 719.45 XR HIP LT W OR WO PELV 2-3 VWS      XR SPINE LUMB 2 OR 3 V   2. Sciatica of left side M54.32 724.3         Plan:  1. Continue present meds  2. Discussed lifestyle modifications including Na restriction, low carb/fat diet, weight reduction and exercise (at least a walking program). Follow-up and Dispositions    · Return for If sxs worsen or fail to improve; or as scheduled. Orders Placed This Encounter    XR HIP LT W OR WO PELV 2-3 VWS     Standing Status:   Future     Number of Occurrences:   1     Standing Expiration Date:   8/2/2020     Order Specific Question:   Reason for Exam     Answer:   left hip pain    XR SPINE LUMB 2 OR 3 V     Standing Status:   Future     Number of Occurrences:   1     Standing Expiration Date:   7/2/2020     Order Specific Question:   Reason for Exam     Answer:   sciatica    predniSONE (STERAPRED DS) 10 mg dose pack     Sig: Take 1 Tab by mouth See Admin Instructions.  See administration instruction per 10mg dose pack     Dispense:  21 Tab     Refill:  0       Fela Small MD

## 2019-07-02 NOTE — PROGRESS NOTES
Reviewed record in preparation for visit and have obtained necessary documentation. Identified pt with two pt identifiers(name and ). Chief Complaint   Patient presents with    Hip Pain     left        Coordination of Care Questionnaire:  :     1) Have you been to an emergency room, urgent care clinic since your last visit? No     Hospitalized since your last visit? No               2) Have you seen or consulted any other health care providers outside of [address] [place] [address] since your last visit?  No

## 2019-07-02 NOTE — PROGRESS NOTES
Per read back verbal order from Dr. Miguel Campos, send in Prednisone 10 mg Dose Pack, #21 tabs, 0 Refill. Requested Prescriptions     Pending Prescriptions Disp Refills    predniSONE (STERAPRED DS) 10 mg dose pack 21 Tab 0     Sig: Take 1 Tab by mouth See Admin Instructions.  See administration instruction per 10mg dose pack

## 2019-07-24 ENCOUNTER — OFFICE VISIT (OUTPATIENT)
Dept: INTERNAL MEDICINE CLINIC | Age: 74
End: 2019-07-24

## 2019-07-24 VITALS
OXYGEN SATURATION: 93 % | BODY MASS INDEX: 23.68 KG/M2 | WEIGHT: 174.8 LBS | DIASTOLIC BLOOD PRESSURE: 83 MMHG | RESPIRATION RATE: 20 BRPM | HEIGHT: 72 IN | HEART RATE: 65 BPM | SYSTOLIC BLOOD PRESSURE: 127 MMHG | TEMPERATURE: 97.7 F

## 2019-07-24 DIAGNOSIS — M54.50 ACUTE MIDLINE LOW BACK PAIN WITHOUT SCIATICA: ICD-10-CM

## 2019-07-24 DIAGNOSIS — M54.32 SCIATICA OF LEFT SIDE: Primary | ICD-10-CM

## 2019-07-24 NOTE — PROGRESS NOTES
Subjective:  Mr. Mary Beth Trevino is a pleasant 76year old gentleman, former patient of Dr. Shalini Briseno, who comes in today for follow up of low back pain. He saw Dr. Tati Pires on July 2nd and was treated with a steroid pack. He got better while on the medication, but now that he is done his symptoms have returned. They are not as severe as previously. Further discussion revealed about five to six years ago he sustained multiple partial compression fractures in his low back. He denies any recent trauma. At times he has had a little bit of tingling in his left leg. He denies any bowel or bladder difficulties. He would like to be referred for physical therapy. Past Medical History:   Diagnosis Date    Mitral valve disorders(424.0) 11/19/2009    4/3/17 followed by PCP no cardiologist    Parkinson disease ([facility])     Prostatism 11/19/2009    Skin cancer      Past Surgical History:   Procedure Laterality Date    COLONOSCOPY N/A 2/7/2018    COLONOSCOPY performed by Lionel Alexander MD at [facility] AMBULATORY OR    HX APPENDECTOMY  childhood    HX COLONOSCOPY         Current Outpatient Medications on File Prior to Visit   Medication Sig Dispense Refill    carbidopa-levodopa (SINEMET)  mg per tablet Take 1 Tab by mouth five (5) times daily. 450 Tab 1    polyethylene glycol (MIRALAX) 17 gram packet Take 17 g by mouth daily as needed.  cholecalciferol, VITAMIN D3, (VITAMIN D3) 5,000 unit tab tablet Take 5,000 Units by mouth two (2) times a week.  predniSONE (STERAPRED DS) 10 mg dose pack Take 1 Tab by mouth See Admin Instructions. See administration instruction per 10mg dose pack 21 Tab 0    amantadine HCl (SYMMETREL) 100 mg tablet TAKE 1/2 TABLETS BY MOUTH TWICE DAILY (Patient taking differently: 100 mg two (2) times a day. TAKE 1/2 TABLETS BY MOUTH TWICE DAILY) 30 Tab 0    carbidopa-levodopa ER (SINEMET CR)  mg per tablet Take 1 Tab by mouth daily.  30 Tab 2     No current facility-administered medications on file prior to visit. No Known Allergies    Physical Examination:  GENERAL:  Very pleasant gentleman in no acute distress. He is alert and oriented. He answers my questions appropriately. VITALS:  BP: 127/83. P: 65.  R: 20.  T: 97.7. O2 sat: 93%. HEENT:  Normocephalic, atraumatic. NECK:  Supple without adenopathy. CHEST:  Lungs clear to auscultation, no rales or wheezes. CV:  Heart regular rhythm without murmur or gallop. EXTREMITIES:  No edema or calf tenderness. Distal pulses were present. BACK:  No pain on percussion of the lumbar spine. SLR negative bilaterally. He has full ROM of both hips without pain. He has excellent strength in lower extremities against resistance. Sensation is preserved. Studies:  Recent x-rays of his lumbar spine - mild anterolisthesis of L4 relative to L5 and some multiple age indeterminate lumbar compression fractures. Impression:  1. Low back pain. 2. History of multiple partial compression fractures. Plan:  1. It was opted to refer him for physical therapy. If he does not improve then I think he should have an MRI of his lumbar spine and referral to ortho back specialist.  He is in agreement. He will certainly contact us if he has any concerns or if he fails to improve with the physical therapy.

## 2019-07-24 NOTE — PATIENT INSTRUCTIONS
Back Pain: Care Instructions  Your Care Instructions    Back pain has many possible causes. It is often related to problems with muscles and ligaments of the back. It may also be related to problems with the nerves, discs, or bones of the back. Moving, lifting, standing, sitting, or sleeping in an awkward way can strain the back. Sometimes you don't notice the injury until later. Arthritis is another common cause of back pain. Although it may hurt a lot, back pain usually improves on its own within several weeks. Most people recover in 12 weeks or less. Using good home treatment and being careful not to stress your back can help you feel better sooner. Follow-up care is a key part of your treatment and safety. Be sure to make and go to all appointments, and call your doctor if you are having problems. It's also a good idea to know your test results and keep a list of the medicines you take. How can you care for yourself at home? · Sit or lie in positions that are most comfortable and reduce your pain. Try one of these positions when you lie down:  ? Lie on your back with your knees bent and supported by large pillows. ? Lie on the floor with your legs on the seat of a sofa or chair. ? Lie on your side with your knees and hips bent and a pillow between your legs. ? Lie on your stomach if it does not make pain worse. · Do not sit up in bed, and avoid soft couches and twisted positions. Bed rest can help relieve pain at first, but it delays healing. Avoid bed rest after the first day of back pain. · Change positions every 30 minutes. If you must sit for long periods of time, take breaks from sitting. Get up and walk around, or lie in a comfortable position. · Try using a heating pad on a low or medium setting for 15 to 20 minutes every 2 or 3 hours. Try a warm shower in place of one session with the heating pad. · You can also try an ice pack for 10 to 15 minutes every 2 to 3 hours.  Put a thin cloth between the ice pack and your skin. · Take pain medicines exactly as directed. ? If the doctor gave you a prescription medicine for pain, take it as prescribed. ? If you are not taking a prescription pain medicine, ask your doctor if you can take an over-the-counter medicine. · Take short walks several times a day. You can start with 5 to 10 minutes, 3 or 4 times a day, and work up to longer walks. Walk on level surfaces and avoid hills and stairs until your back is better. · Return to work and other activities as soon as you can. Continued rest without activity is usually not good for your back. · To prevent future back pain, do exercises to stretch and strengthen your back and stomach. Learn how to use good posture, safe lifting techniques, and proper body mechanics. When should you call for help? Call your doctor now or seek immediate medical care if:    · You have new or worsening numbness in your legs.     · You have new or worsening weakness in your legs. (This could make it hard to stand up.)     · You lose control of your bladder or bowels.    Watch closely for changes in your health, and be sure to contact your doctor if:    · You have a fever, lose weight, or don't feel well.     · You do not get better as expected. Where can you learn more? Go to http://adrian-mitchel.info/. Enter X028 in the search box to learn more about \"Back Pain: Care Instructions. \"  Current as of: September 20, 2018  Content Version: 12.1  © 2637-9490 [employer]. Care instructions adapted under license by [employer] (which disclaims liability or warranty for this information). If you have questions about a medical condition or this instruction, always ask your healthcare professional. [place] [address] any warranty or liability for your use of this information.

## 2019-07-24 NOTE — PROGRESS NOTES
Lorena Lucero  Identified pt with two pt identifiers(name and ). Chief Complaint   Patient presents with    Hip Pain     left   Former patient of Dr Daren Estes here with continued complaint of left hip pain. Given prednisone in early July which he has finished with some relief, but now wants to pursue Physical therapy for possible relief. 1. Have you been to the ER, urgent care clinic since your last visit? Hospitalized since your last visit? no    2. Have you seen or consulted any other health care providers outside of the [address] [place] [address] since your last visit? Include any pap smears or colon screening. no      Medication reconciliation up to date and corrected with patient at this time. Today's provider has been notified of reason for visit, vitals and flowsheets obtained on patients. Reviewed record in preparation for visit, huddled with provider and have obtained necessary documentation. Depression Risk Factor Screening:     3 most recent PHQ Screens 3/21/2019   Little interest or pleasure in doing things Not at all   Feeling down, depressed, irritable, or hopeless Not at all   Total Score PHQ 2 0       Functional Ability and Level of Safety:     Activities of Daily Living  No flowsheet data found. Fall Risk  Fall Risk Assessment, last 12 mths 2019   Able to walk? Yes   Fall in past 12 months? No   Fall with injury? -   Number of falls in past 12 months -   Fall Risk Score -       Abuse Screen  No flowsheet data found.         Health Maintenance Due   Topic    Shingrix Vaccine Age 50> (1 of 2)    FOBT Q 1 YEAR AGE 50-75     GLAUCOMA SCREENING Q2Y     MEDICARE YEARLY EXAM        Wt Readings from Last 3 Encounters:   19 174 lb 12.8 oz (79.3 kg)   19 173 lb 3.2 oz (78.6 kg)   19 171 lb (77.6 kg)     Temp Readings from Last 3 Encounters:   19 97.7 °F (36.5 °C) (Oral)   19 98.2 °F (36.8 °C) (Oral)   19 97.7 °F (36.5 °C) (Oral)     BP Readings from Last 3 Encounters:   07/24/19 127/83   07/02/19 103/67   05/30/19 112/80     Pulse Readings from Last 3 Encounters:   07/24/19 65   07/02/19 71   05/30/19 68     Vitals:    07/24/19 1119   BP: 127/83   Pulse: 65   Resp: 20   Temp: 97.7 °F (36.5 °C)   TempSrc: Oral   SpO2: 93%   Weight: 174 lb 12.8 oz (79.3 kg)   Height: 5' 11.5\" (1.816 m)   PainSc:   6   PainLoc: Hip         Patient Care Team   Patient Care Team:  Suzette Lanier MD as PCP - General (Internal Medicine)  Pablito Butcher MD (Family Practice)  Jaimie Francois MD (Neurology)

## 2019-08-06 DIAGNOSIS — G20 PARKINSON DISEASE (HCC): ICD-10-CM

## 2019-08-09 RX ORDER — CARBIDOPA AND LEVODOPA 25; 100 MG/1; MG/1
TABLET ORAL
Qty: 450 TAB | Refills: 1 | Status: SHIPPED | OUTPATIENT
Start: 2019-08-09 | End: 2020-03-13

## 2019-09-23 ENCOUNTER — OFFICE VISIT (OUTPATIENT)
Dept: NEUROLOGY | Age: 74
End: 2019-09-23

## 2019-09-23 VITALS
WEIGHT: 178 LBS | HEART RATE: 72 BPM | SYSTOLIC BLOOD PRESSURE: 110 MMHG | OXYGEN SATURATION: 95 % | DIASTOLIC BLOOD PRESSURE: 60 MMHG | BODY MASS INDEX: 24.11 KG/M2 | HEIGHT: 72 IN

## 2019-09-23 DIAGNOSIS — K59.03 DRUG-INDUCED CONSTIPATION: ICD-10-CM

## 2019-09-23 DIAGNOSIS — G20 PARKINSON DISEASE (HCC): Primary | ICD-10-CM

## 2019-09-23 DIAGNOSIS — G24.9 DYSKINESIA: ICD-10-CM

## 2019-09-23 DIAGNOSIS — I95.1 ORTHOSTATIC HYPOTENSION: ICD-10-CM

## 2019-09-23 RX ORDER — CARBIDOPA AND LEVODOPA 25; 100 MG/1; MG/1
1 TABLET, EXTENDED RELEASE ORAL DAILY
Qty: 90 TAB | Refills: 1 | Status: SHIPPED | OUTPATIENT
Start: 2019-09-23 | End: 2020-01-03 | Stop reason: SDUPTHER

## 2019-09-23 NOTE — LETTER
9/23/19 Patient: Jordan Quispe YOB: 1945 Date of Visit: 9/23/2019 Prudencio Olivo MD 
[facility] [address] [address] VIA Facsimile: [phone] Dear Prudencio Olivo MD, Thank you for referring Mr. Michael Gleason to [address] [place] [address] for evaluation. My notes for this consultation are attached. If you have questions, please do not hesitate to call me. I look forward to following your patient along with you. Sincerely, Jae Gitelman., MD

## 2019-09-23 NOTE — PATIENT INSTRUCTIONS
Parkinson's Disease: Care Instructions Your Care Instructions Parkinson's disease can cause tremors, stiffness, and problems with movement. Severe or advanced cases can also cause problems with thinking. In Parkinson's disease, part of the brain cannot make enough dopamine, a chemical that helps control movement. Taking your medicines correctly and getting regular exercise may help you maintain your quality of life. There are many things that can cause Parkinson's disease symptoms, including some medicine, some toxins, and trauma to the head. The cause in most cases is not known. Follow-up care is a key part of your treatment and safety. Be sure to make and go to all appointments, and call your doctor if you are having problems. It's also a good idea to know your test results and keep a list of the medicines you take. How can you care for yourself at home? General care · Take your medicines exactly as prescribed. Call your doctor if you think you are having a problem with your medicine. · Make sure your home is safe: 
? Place furniture so that you have something to hold on to as you walk around the house. ? Use chairs that make it easier to sit down and stand up. ? Group the things you use most, such as reading glasses, keys, and the telephone, in one easy-to-reach place. ? Tack down rugs so that you do not trip. ? Put no-slip tape and handrails in the tub to prevent falls. · Use a cane, walker, or scooter if your doctor suggests it. · Keep up your normal activities as much as you can. · Find ways to manage stress, which can make symptoms worse. · Spend time with family and friends. Join a support group for people with Parkinson's disease if you want extra help. · Depression is common with this condition. Tell your doctor if you have trouble sleeping, are eating too much or are not hungry, or feel sad or tearful all the time. Depression can be treated with medicine and counseling. Diet and exercise · Eat a balanced diet. · If you are taking levodopa, do not eat protein at the same time you take your medicine. Levodopa may not work as well if you take it at the same time you eat protein. You can eat normal amounts of protein. Talk to your doctor if you have questions. · If you have problems swallowing, change how and what you eat: ? Try thick drinks, such as milk shakes. They are easier to swallow than other fluids. ? Do not eat foods that crumble easily. These can cause choking. ? Use a  to prepare food. Soft foods need less chewing. ? Eat small meals often so that you do not get tired from eating heavy meals. · Drink plenty of water and eat a high-fiber diet to prevent constipation. Parkinson'sand the medicines that treat itmay slow your intestines. · Get exercise on most days. Work with your doctor to set up a program of walking, swimming, or other exercise you are able to do. When should you call for help? Call your doctor now or seek immediate medical care if: 
  · You have a change in your symptoms.  
  · You develop other problems from your condition, such as: 
? Injury from a fall. ? Thinking or memory problems. ? A urinary tract infection (burning pain when urinating).  
 Watch closely for changes in your health, and be sure to contact your doctor if: 
  · You lose weight because of problems with eating.  
  · You want more information about your condition or your medicines. Where can you learn more? Go to http://adrian-mitchel.info/. Enter Y404 in the search box to learn more about \"Parkinson's Disease: Care Instructions. \" Current as of: March 28, 2019 Content Version: 12.2 © 7207-0100 [employer]. Care instructions adapted under license by [employer] (which disclaims liability or warranty for this information).  If you have questions about a medical condition or this instruction, always ask your healthcare professional. [place] [address] any warranty or liability for your use of this information.

## 2019-12-09 ENCOUNTER — OFFICE VISIT (OUTPATIENT)
Dept: INTERNAL MEDICINE CLINIC | Age: 74
End: 2019-12-09

## 2019-12-09 VITALS
BODY MASS INDEX: 24.06 KG/M2 | TEMPERATURE: 97.7 F | HEIGHT: 72 IN | SYSTOLIC BLOOD PRESSURE: 110 MMHG | WEIGHT: 177.6 LBS | HEART RATE: 51 BPM | DIASTOLIC BLOOD PRESSURE: 68 MMHG | OXYGEN SATURATION: 100 % | RESPIRATION RATE: 16 BRPM

## 2019-12-09 DIAGNOSIS — M85.80 OSTEOPENIA, UNSPECIFIED LOCATION: ICD-10-CM

## 2019-12-09 DIAGNOSIS — I34.1 MVP (MITRAL VALVE PROLAPSE): ICD-10-CM

## 2019-12-09 DIAGNOSIS — Z23 ENCOUNTER FOR IMMUNIZATION: ICD-10-CM

## 2019-12-09 DIAGNOSIS — E55.9 VITAMIN D DEFICIENCY: ICD-10-CM

## 2019-12-09 DIAGNOSIS — Z12.5 PROSTATE CANCER SCREENING: ICD-10-CM

## 2019-12-09 DIAGNOSIS — Z00.00 ANNUAL PHYSICAL EXAM: Primary | ICD-10-CM

## 2019-12-09 DIAGNOSIS — G20 PARKINSON'S DISEASE (TREMOR, STIFFNESS, SLOW MOTION, UNSTABLE POSTURE) (HCC): ICD-10-CM

## 2019-12-09 LAB
A-G RATIO,AGRAT: 1.4 RATIO
ALBUMIN SERPL-MCNC: 4.3 G/DL (ref 3.9–5.4)
ALP SERPL-CCNC: 82 U/L (ref 38–126)
ALT SERPL-CCNC: 6 U/L (ref 0–50)
ANION GAP SERPL CALC-SCNC: 13 MMOL/L
AST SERPL W P-5'-P-CCNC: 37 U/L (ref 14–36)
BILIRUB SERPL-MCNC: 1.1 MG/DL (ref 0.2–1.3)
BILIRUB UR QL: NEGATIVE
BUN SERPL-MCNC: 18 MG/DL (ref 9–20)
BUN/CREATININE RATIO,BUCR: 16 RATIO
CALCIUM SERPL-MCNC: 9.4 MG/DL (ref 8.4–10.2)
CHLORIDE SERPL-SCNC: 99 MMOL/L (ref 98–107)
CHOL/HDL RATIO,CHHD: 3 RATIO (ref 0–4)
CHOLEST SERPL-MCNC: 192 MG/DL (ref 0–200)
CLARITY: CLEAR
CO2 SERPL-SCNC: 28 MMOL/L (ref 22–32)
COLOR UR: NORMAL
CREAT SERPL-MCNC: 1.1 MG/DL (ref 0.8–1.5)
GLOBULIN,GLOB: 3
GLUCOSE 24H UR-MRATE: NEGATIVE G/(24.H)
GLUCOSE SERPL-MCNC: 86 MG/DL (ref 75–110)
HDLC SERPL-MCNC: 62 MG/DL (ref 35–130)
HGB UR QL STRIP: NEGATIVE
KETONES UR QL STRIP.AUTO: NEGATIVE
LDL/HDL RATIO,LDHD: 2 RATIO
LDLC SERPL CALC-MCNC: 116 MG/DL (ref 0–130)
LEUKOCYTE ESTERASE: NEGATIVE
NITRITE UR QL STRIP.AUTO: NEGATIVE
PH UR STRIP: 6 [PH] (ref 5–7)
POTASSIUM SERPL-SCNC: 4.4 MMOL/L (ref 3.6–5)
PROT SERPL-MCNC: 7.3 G/DL (ref 6.3–8.2)
PROT UR STRIP-MCNC: NEGATIVE MG/DL
RBC #/AREA URNS HPF: 0 #/HPF
SODIUM SERPL-SCNC: 140 MMOL/L (ref 137–145)
SP GR UR REFRACTOMETRY: 1.01 (ref 1–1.03)
TRIGL SERPL-MCNC: 72 MG/DL (ref 0–200)
UROBILINOGEN UR QL STRIP.AUTO: NEGATIVE
VLDLC SERPL CALC-MCNC: 14 MG/DL
WBC URNS QL MICRO: 0 #/HPF

## 2019-12-09 NOTE — PROGRESS NOTES
This note will not be viewable in [address] Dakota Hankins is a 76 y.o. male and presents with Annual Wellness Visit and Tremors      Subjective:  Patient comes in today for his annual Medicare wellness visit and follow-up of his Parkinson's disease, left-sided sciatica. Patient follows up with his neurologist Dr. Shraddha Rayo for his Parkinson's. He was initially on amantadine but that was discontinued as he was in helping him anyways. He reports taking Sinemet as prescribed. Reports some tremors in his right hands which are not worse in the past.  He tried Medrol Dosepak for his left-sided sciatica and his symptoms have been stable. Mitral valve prolapse on echocardiography, asymptomatic. Denies chest pain, PND, orthopnea, leg edema. Has a history of recurrent skin cancers in the past.      Past Medical History:   Diagnosis Date    Mitral valve disorders(424.0) 11/19/2009    4/3/17 followed by PCP no cardiologist    Parkinson disease ([facility] [address]     Prostatism 11/19/2009    Skin cancer      Past Surgical History:   Procedure Laterality Date    COLONOSCOPY N/A 2/7/2018    COLONOSCOPY performed by Alison Cash MD at [facility] 57 HX APPENDECTOMY  childhood    HX COLONOSCOPY       No Known Allergies  Current Outpatient Medications   Medication Sig Dispense Refill    GLUTATHIONE 200 mg by Does Not Apply route three (3) times daily.  carbidopa-levodopa ER (SINEMET CR)  mg per tablet Take 1 Tab by mouth daily. 90 Tab 1    carbidopa-levodopa (SINEMET)  mg per tablet Take 1 tablet five times a day 450 Tab 1    polyethylene glycol (MIRALAX) 17 gram packet Take 17 g by mouth daily as needed.  cholecalciferol, VITAMIN D3, (VITAMIN D3) 5,000 unit tab tablet Take 5,000 Units by mouth two (2) times a week.        Social History     Socioeconomic History    Marital status:      Spouse name: Not on file    Number of children: Not on file    Years of education: Not on file  Highest education level: Not on file   Tobacco Use    Smoking status: Former Smoker    Smokeless tobacco: Never Used   Substance and Sexual Activity    Alcohol use: Yes     Comment: occsional glass of wine    Drug use: No   Social History Narrative    ** Merged History Encounter **          Family History   Problem Relation Age of Onset    Cancer Mother         colon       Review of Systems  ROS is unremarkable except for ones highlighted in bold.    Constitutional: negative for fevers, chills, anorexia and weight loss  Eyes:   negative for visual disturbance and irritation  ENT:   negative for tinnitus,sore throat,nasal congestion,ear pain,hoarseness  Respiratory:  negative for cough, hemoptysis, dyspnea,wheezing  CV:   negative for chest pain, palpitations, lower extremity edema  GI:   negative for nausea, vomiting, diarrhea, abdominal pain,melena  Endo:               negative for polyuria,polydipsia,polyphagia,heat intolerance  Genitourinary: negative for frequency, dysuria and hematuria  Integumentary: negative for rash and pruritus  Hematologic:  negative for easy bruising and gum/nose bleeding  Musculoskel: negative for myalgias, arthralgias, back pain, muscle weakness, joint pain  Neurological:  negative for headaches, dizziness, vertigo, memory problems and gait, tremors  Behavl/Psych: negative for feelings of anxiety, depression, mood changes  ROS otherwise negative     Objective:  Visit Vitals  /68 (BP 1 Location: Left arm, BP Patient Position: Sitting)   Pulse (!) 51   Temp 97.7 °F (36.5 °C) (Oral)   Resp 16   Ht 5' 11.5\" (1.816 m)   Wt 177 lb 9.6 oz (80.6 kg)   SpO2 100%   BMI 24.42 kg/m²     Physical Exam:   General appearance - alert, well appearing, and in no distress  Mental status - alert, oriented to person, place, and time  EYE-REYES, EOMI, fundi normal, corneas normal, no foreign bodies  ENT-ENT exam normal, no neck nodes or sinus tenderness  Nose - normal and patent, no erythema, discharge or polyps  Mouth - mucous membranes moist, pharynx normal without lesions  Neck - supple, no significant adenopathy   Chest - clear to auscultation, no wheezes, rales or rhonchi, symmetric air entry   Heart - normal rate, regular rhythm, normal S1, S2, no murmurs, rubs, clicks or gallops   Abdomen - soft, nontender, nondistended, no masses or organomegaly  Lymph- no adenopathy palpable  Ext-peripheral pulses normal, no pedal edema, no clubbing or cyanosis  Skin-Warm and dry. no hyperpigmentation, vitiligo, or suspicious lesions  Neuro -alert, oriented, normal speech, no focal findings or movement disorder noted, right hand tremors noticed at rest  Musculoskeletal- FROM, no bony abnormalities, no point tenderness    Lab Review:  Results for orders placed or performed in visit on 05/30/19   CBC WITH AUTOMATED DIFF   Result Value Ref Range    WBC 6.5 4.1 - 10.9 K/uL    RBC 4.47 4.20 - 6.30 M/uL    HGB 14.8 12.0 - 18.0 g/dL    HCT 43.8 37.0 - 51.0 %    MCH 33.1 (H) 26.0 - 32.0 pg    MCHC 33.8 30.0 - 36.0 g/dL    MCV 97.9 (H) 80.0 - 97.0 fL    RDW 12.3 %    PLATELET 936.2 916.5 - 440.0 K/uL    MEAN PLATELET VOLUME 8.0 fL    Neutrophils abs 4.3 2.0 - 7.8 K/uL    Neutrophils 66.0 37.0 - 92.0 %    Monocytes abs-DIF 0.5 0.0 - 1.8 K/uL    MONOCYTES 7.8 0.1 - 24.0 %    Lymphocytes Absolute 1.7 0.6 - 4.1 K/uL    LYMPHOCYTES 26.2 10.0 - 52.7 %   METABOLIC PANEL, BASIC   Result Value Ref Range    Glucose 61 (L) 65 - 99 mg/dL    BUN 19 8 - 27 mg/dL    Creatinine 1.11 0.76 - 1.27 mg/dL    GFR est non-AA 65 >59 mL/min/1.73    GFR est AA 75 >59 mL/min/1.73    BUN/Creatinine ratio 17 10 - 24    Sodium 143 134 - 144 mmol/L    Potassium 4.4 3.5 - 5.2 mmol/L    Chloride 105 96 - 106 mmol/L    CO2 26 20 - 29 mmol/L    Calcium 9.0 8.6 - 10.2 mg/dL        Documenation Review:  Reviewed records of Dr. Rowe/neurologist.  Patient follows up with him for his Parkinson's. Patient continues to take Sinemet.   He had stopped taking amantadine as that was not helping his tremors. He continues physical therapy and yoga which helps. Imaging review:  Colonoscopy 2/7/2018 by Dr. Jayla Gerardo. Patient has a family history of colon cancer, underwent colonoscopy which was unremarkable. Next colonoscopy due in 5 years. 2023. DEXA scan 2018. 10-year probability of major osteoporotic fracture 9.6%, 10-year probability of hip fracture 1.5%. Patient was osteopenic. Assessment/Plan:    Diagnoses and all orders for this visit:    1. Annual physical exam  -     CBC W/O DIFF  -     TSH 3RD GENERATION  -     LIPID PANEL  -     METABOLIC PANEL, COMPREHENSIVE  -     URINALYSIS W/MICROSCOPIC    2. Parkinson's disease (tremor, stiffness, slow motion, unstable posture) ([address]    3. MVP (mitral valve prolapse)    4. Osteopenia, unspecified location    5. Vitamin D deficiency  -     VITAMIN D, 25 HYDROXY    6. Prostate cancer screening  -     PSA SCREENING (SCREENING)    7. Encounter for immunization  -     INFLUENZA VACCINE INACTIVATED (IIV), SUBUNIT, ADJUVANTED, IM  -     FL IMMUNIZ ADMIN,1 SINGLE/COMB VAC/TOXOID        Patient follows up with neurologist Dr. Mary Bryant for his Parkinson's disease. He continues to remain on Sinemet. He was on amantadine in the past which was discontinued as it was not helping him much anyways. Patient had DEXA scan in 2018 and was osteopenic. Encouraged weightbearing exercises, calcium and vitamin D. He is up-to-date and all his immunization. Will receive his flu shot today. Reports he still has to get second dose of Shingrix vaccine. Eye examinations are current. He sees his ophthalmologist every 6 months. Continue current meds. I will call with lab results and make further recommendations or adjustments if necessary. Encouraged weight-bearing exercises, calcium and vitamin D as recommended. ICD-10-CM ICD-9-CM    1.  Annual physical exam Z00.00 V70.0 CBC W/O DIFF      TSH 3RD GENERATION      LIPID PANEL METABOLIC PANEL, COMPREHENSIVE      URINALYSIS W/MICROSCOPIC   2. Parkinson's disease (tremor, stiffness, slow motion, unstable posture) ([facility] [address] G20 332.0    3. MVP (mitral valve prolapse) I34.1 424.0    4. Osteopenia, unspecified location M85.80 733.90    5. Vitamin D deficiency E55.9 268.9 VITAMIN D, 25 HYDROXY   6. Prostate cancer screening Z12.5 V76.44 PSA SCREENING (SCREENING)   7. Encounter for immunization Z23 V03.89 INFLUENZA VACCINE INACTIVATED (IIV), SUBUNIT, ADJUVANTED, IM      NE IMMUNIZ ADMIN,1 SINGLE/COMB VAC/TOXOID         Follow-up and Dispositions    · Return in about 6 months (around 6/9/2020) for follow up. I have reviewed with the patient details of the assessment and plan and all questions were answered. Relevent patient education was performed. Verbal and/or written instructions (see AVS) provided. The most recent lab findings were reviewed with the patient. Plan was discussed with patient who verbally expressed understanding. An After Visit Summary was printed and given to the patient.     Kelly Chavarria MD

## 2019-12-09 NOTE — PROGRESS NOTES
After obtaining written consent and per orders of Dr. Stacey Jennings, injection of Fluad given by [facility]. Order and injection/medication verified by second nurse/ma review by Kalina Bergeron. Patient tolerated procedure well. VIS was given to them. No reactions noted.

## 2019-12-09 NOTE — PROGRESS NOTES
Chief Complaint   Patient presents with    Annual Wellness Visit    Tremors       Depression Risk Factor Screening:     3 most recent PHQ Screens 12/9/2019   Little interest or pleasure in doing things Not at all   Feeling down, depressed, irritable, or hopeless Not at all   Total Score PHQ 2 0       Functional Ability and Level of Safety:     Activities of Daily Living  ADL Assessment 12/9/2019   Feeding yourself No Help Needed   Getting from bed to chair No Help Needed   Getting dressed No Help Needed   Bathing or showering No Help Needed   Walk across the room (includes cane/walker) No Help Needed   Using the telphone No Help Needed   Taking your medications No Help Needed   Preparing meals No Help Needed   Managing money (expenses/bills) No Help Needed   Moderately strenuous housework (laundry) No Help Needed   Shopping for personal items (toiletries/medicines) No Help Needed   Shopping for groceries No Help Needed   Driving No Help Needed   Climbing a flight of stairs No Help Needed   Getting to places beyond walking distances No Help Needed       Fall Risk  Fall Risk Assessment, last 12 mths 12/9/2019   Able to walk? Yes   Fall in past 12 months? No   Fall with injury? -   Number of falls in past 12 months -   Fall Risk Score -       Abuse Screen  Abuse Screening Questionnaire 12/9/2019   Do you ever feel afraid of your partner? N   Are you in a relationship with someone who physically or mentally threatens you? N   Is it safe for you to go home?  Y         Patient Care Team   Patient Care Team:  Ni Leon MD as PCP - General (Internal Medicine)  Bran Page NP as PCP - [facility] Empaneled Provider  Janet Homans, MD (Family Practice)  Kar Brambila MD (Neurology)

## 2019-12-09 NOTE — PATIENT INSTRUCTIONS
Vaccine Information Statement Influenza (Flu) Vaccine (Inactivated or Recombinant): What You Need to Know Many Vaccine Information Statements are available in Kiswahili and other languages. See www.immunize.org/vis Hojas de información sobre vacunas están disponibles en español y en muchos otros idiomas. Visite www.immunize.org/vis 1. Why get vaccinated? Influenza vaccine can prevent influenza (flu). Flu is a contagious disease that spreads around the [facility] every year, usually between October and May. Anyone can get the flu, but it is more dangerous for some people. Infants and young children, people 72years of age and older, pregnant women, and people with certain health conditions or a weakened immune system are at greatest risk of flu complications. Pneumonia, bronchitis, sinus infections and ear infections are examples of flu-related complications. If you have a medical condition, such as heart disease, cancer or diabetes, flu can make it worse. Flu can cause fever and chills, sore throat, muscle aches, fatigue, cough, headache, and runny or stuffy nose. Some people may have vomiting and diarrhea, though this is more common in children than adults. Each year thousands of people in the [facility] die from flu, and many more are hospitalized. Flu vaccine prevents millions of illnesses and flu-related visits to the doctor each year. 2. Influenza vaccines CDC recommends everyone 10months of age and older get vaccinated every flu season. Children 6 months through 6years of age may need 2 doses during a single flu season. Everyone else needs only 1 dose each flu season. It takes about 2 weeks for protection to develop after vaccination. There are many flu viruses, and they are always changing. Each year a new flu vaccine is made to protect against three or four viruses that are likely to cause disease in the upcoming flu season.  Even when the vaccine doesnt exactly match these viruses, it may still provide some protection. Influenza vaccine does not cause flu. Influenza vaccine may be given at the same time as other vaccines. 3. Talk with your health care provider Tell your vaccine provider if the person getting the vaccine: 
 Has had an allergic reaction after a previous dose of influenza vaccine, or has any severe, life-threatening allergies.  Has ever had Guillain-Barré Syndrome (also called GBS). In some cases, your health care provider may decide to postpone influenza vaccination to a future visit. People with minor illnesses, such as a cold, may be vaccinated. People who are moderately or severely ill should usually wait until they recover before getting influenza vaccine. Your health care provider can give you more information. 4. Risks of a reaction  Soreness, redness, and swelling where shot is given, fever, muscle aches, and headache can happen after influenza vaccine.  There may be a very small increased risk of Guillain-Barré Syndrome (GBS) after inactivated influenza vaccine (the flu shot). Irl Pae children who get the flu shot along with pneumococcal vaccine (PCV13), and/or DTaP vaccine at the same time might be slightly more likely to have a seizure caused by fever. Tell your health care provider if a child who is getting flu vaccine has ever had a seizure. People sometimes faint after medical procedures, including vaccination. Tell your provider if you feel dizzy or have vision changes or ringing in the ears. As with any medicine, there is a very remote chance of a vaccine causing a severe allergic reaction, other serious injury, or death. 5. What if there is a serious problem? An allergic reaction could occur after the vaccinated person leaves the clinic.  If you see signs of a severe allergic reaction (hives, swelling of the face and throat, difficulty breathing, a fast heartbeat, dizziness, or weakness), call 9-1-1 and get the person to the nearest hospital. 
 
For other signs that concern you, call your health care provider. Adverse reactions should be reported to the Vaccine Adverse Event Reporting System (VAERS). Your health care provider will usually file this report, or you can do it yourself. Visit the VAERS website at www.vaers. hhs.gov or call 6-768.129.5239. VAERS is only for reporting reactions, and VAERS staff do not give medical advice. 6. The National Vaccine Injury Compensation Program 
 
The [facility] Vaccine Injury Compensation Program (VICP) is a federal program that was created to compensate people who may have been injured by certain vaccines. Visit the VICP website at www.[facility].gov/vaccinecompensation or call 6-735.834.2840 to learn about the program and about filing a claim. There is a time limit to file a claim for compensation. 7. How can I learn more?  Ask your health care provider.  Call your local or state health department.  Contact the Centers for Disease Control and Prevention (CDC): 
- Call 6-929.420.7607 (3-333-YDL-INFO) or 
- Visit CDCs influenza website at www.cdc.gov/flu Vaccine Information Statement (Interim) Inactivated Influenza Vaccine 8/15/2019 
[address] [id number]-68 [facility] [employer] Centers for Disease Control and Prevention Office Use Only The best way to stay healthy is to live a healthy lifestyle. A healthy lifestyle includes regular exercise, eating a well-balanced diet, keeping a healthy weight and not smoking. Regular physical exams and screening tests are another important way to take care of yourself. Preventive exams provided by health care providers can find health problems early when treatment works best and can keep you from getting certain diseases or illnesses. Preventive services include exams, lab tests, screenings, shots, monitoring and information to help you take care of your own health. All people over 65 should have a pneumonia shot. Pneumonia shots are usually only needed once in a lifetime unless your doctor decides differently. In addition to your physical exam, some screening tests are recommended: 
 
All people over 65 should have a yearly flu shot. People over 65 are at medium to high risk for Hepatitis B. Three shots are needed for complete protection. Bone mass measurement (dexa scan) is recommended every two years. Diabetes Mellitus screening is recommended every year. Glaucoma is an eye disease caused by high pressure in the eye. An eye exam is recommended every year. Cardiovascular screening tests that check your cholesterol and other blood fat (lipid) levels are recommended every five years. Colorectal Cancer screening tests help to find pre-cancerous polyps (growths in the colon) so they can be removed before they turn into cancer. Tests ordered for screening depend on your personal and family history risk factors. Prostate Cancer Screening (annually up to age 76) Screening for breast cancer is recommended yearly with a Mammogram. 
 
Screening for cervical and vaginal cancer is recommended with a pelvic and Pap test every two years. However if you have had an abnormal pap in the past  three years or at high risk for cervical or vaginal cancer Medicare will cover a pap test and a pelvic exam every year. Here is a list of your current Health Maintenance items with a due date: 
Health Maintenance Due Topic Date Due  Shingles Vaccine (1 of 2) 01/07/1995  Stool testing for trace blood  01/07/1995  Glaucoma Screening   01/07/2010 Otilia Annual Well Visit  10/18/2018  Flu Vaccine  08/01/2019 Parkinson's Disease: Care Instructions Your Care Instructions Parkinson's disease can cause tremors, stiffness, and problems with movement. Severe or advanced cases can also cause problems with thinking. In Parkinson's disease, part of the brain cannot make enough dopamine, a chemical that helps control movement. Taking your medicines correctly and getting regular exercise may help you maintain your quality of life. There are many things that can cause Parkinson's disease symptoms, including some medicine, some toxins, and trauma to the head. The cause in most cases is not known. Follow-up care is a key part of your treatment and safety. Be sure to make and go to all appointments, and call your doctor if you are having problems. It's also a good idea to know your test results and keep a list of the medicines you take. How can you care for yourself at home? General care · Take your medicines exactly as prescribed. Call your doctor if you think you are having a problem with your medicine. · Make sure your home is safe: 
? Place furniture so that you have something to hold on to as you walk around the house. ? Use chairs that make it easier to sit down and stand up. ? Group the things you use most, such as reading glasses, keys, and the telephone, in one easy-to-reach place. ? Tack down rugs so that you do not trip. ? Put no-slip tape and handrails in the tub to prevent falls. · Use a cane, walker, or scooter if your doctor suggests it. · Keep up your normal activities as much as you can. · Find ways to manage stress, which can make symptoms worse. · Spend time with family and friends. Join a support group for people with Parkinson's disease if you want extra help. · Depression is common with this condition. Tell your doctor if you have trouble sleeping, are eating too much or are not hungry, or feel sad or tearful all the time. Depression can be treated with medicine and counseling. Diet and exercise · Eat a balanced diet. · If you are taking levodopa, do not eat protein at the same time you take your medicine.  Levodopa may not work as well if you take it at the same time you eat protein. You can eat normal amounts of protein. Talk to your doctor if you have questions. · If you have problems swallowing, change how and what you eat: ? Try thick drinks, such as milk shakes. They are easier to swallow than other fluids. ? Do not eat foods that crumble easily. These can cause choking. ? Use a  to prepare food. Soft foods need less chewing. ? Eat small meals often so that you do not get tired from eating heavy meals. · Drink plenty of water and eat a high-fiber diet to prevent constipation. Parkinson'sand the medicines that treat itmay slow your intestines. · Get exercise on most days. Work with your doctor to set up a program of walking, swimming, or other exercise you are able to do. When should you call for help? Call your doctor now or seek immediate medical care if: 
  · You have a change in your symptoms.  
  · You develop other problems from your condition, such as: 
? Injury from a fall. ? Thinking or memory problems. ? A urinary tract infection (burning pain when urinating).  
 Watch closely for changes in your health, and be sure to contact your doctor if: 
  · You lose weight because of problems with eating.  
  · You want more information about your condition or your medicines. Where can you learn more? Go to http://adrian-mitchel.info/. Enter K720 in the search box to learn more about \"Parkinson's Disease: Care Instructions. \" Current as of: March 28, 2019 Content Version: 12.2 © 1604-0675 [employer]. Care instructions adapted under license by [employer] (which disclaims liability or warranty for this information). If you have questions about a medical condition or this instruction, always ask your healthcare professional. [place] [address] any warranty or liability for your use of this information.

## 2019-12-10 LAB
25(OH)D3 SERPL-MCNC: 69 NG/ML (ref 30–96)
ERYTHROCYTE [DISTWIDTH] IN BLOOD BY AUTOMATED COUNT: 13.2 % (ref 12.3–15.4)
HCT VFR BLD AUTO: 42.5 % (ref 37.5–51)
HGB BLD-MCNC: 14.1 G/DL (ref 13–17.7)
MCH RBC QN AUTO: 31.8 PG (ref 26.6–33)
MCHC RBC AUTO-ENTMCNC: 33.2 G/DL (ref 31.5–35.7)
MCV RBC AUTO: 96 FL (ref 79–97)
PLATELET # BLD AUTO: 163 X10E3/UL (ref 150–450)
PSA, TEST22: 0.5 NG/ML (ref 0–4)
RBC # BLD AUTO: 4.43 X10E6/UL (ref 4.14–5.8)
TSH SERPL DL<=0.05 MIU/L-ACNC: 1.94 UIU/ML (ref 0.34–5.6)
WBC # BLD AUTO: 6.8 X10E3/UL (ref 3.4–10.8)

## 2020-01-03 ENCOUNTER — OFFICE VISIT (OUTPATIENT)
Dept: FAMILY MEDICINE CLINIC | Age: 75
End: 2020-01-03

## 2020-01-03 VITALS
BODY MASS INDEX: 23.73 KG/M2 | WEIGHT: 175.2 LBS | DIASTOLIC BLOOD PRESSURE: 81 MMHG | RESPIRATION RATE: 16 BRPM | TEMPERATURE: 98.1 F | HEIGHT: 72 IN | SYSTOLIC BLOOD PRESSURE: 153 MMHG | HEART RATE: 98 BPM | OXYGEN SATURATION: 95 %

## 2020-01-03 DIAGNOSIS — R79.89 HOMOCYSTEINE LEVEL ABOVE REFERENCE RANGE: Primary | ICD-10-CM

## 2020-01-03 DIAGNOSIS — Z76.89 ENCOUNTER TO ESTABLISH CARE: ICD-10-CM

## 2020-01-03 DIAGNOSIS — E53.8 VITAMIN B12 DEFICIENCY: ICD-10-CM

## 2020-01-03 DIAGNOSIS — G20 PARKINSON'S DISEASE (TREMOR, STIFFNESS, SLOW MOTION, UNSTABLE POSTURE) (HCC): ICD-10-CM

## 2020-01-03 DIAGNOSIS — G20 PARKINSON DISEASE (HCC): ICD-10-CM

## 2020-01-03 RX ORDER — CYANOCOBALAMIN 1000 UG/ML
1000 INJECTION, SOLUTION INTRAMUSCULAR; SUBCUTANEOUS ONCE
Qty: 1 ML | Refills: 0
Start: 2020-01-03 | End: 2020-01-03

## 2020-01-03 RX ORDER — CARBIDOPA AND LEVODOPA 25; 100 MG/1; MG/1
1 TABLET, EXTENDED RELEASE ORAL DAILY
Qty: 90 TAB | Refills: 1
Start: 2020-01-03 | End: 2020-06-09 | Stop reason: SDUPTHER

## 2020-01-03 RX ORDER — LANOLIN ALCOHOL/MO/W.PET/CERES
1000 CREAM (GRAM) TOPICAL DAILY
COMMUNITY
End: 2020-01-03 | Stop reason: ALTCHOICE

## 2020-01-03 NOTE — PROGRESS NOTES
Chief Complaint   Patient presents with   [facility]       1. Have you been to the ER, urgent care clinic since your last visit? Hospitalized since your last visit?n/a np    2. Have you seen or consulted any other health care providers outside of the [address] [place] [address] since your last visit? Include any pap smears or colon screening. n/a np    Health maintenance reviewed. Pt informed of health maintenance past due and/or upcoming. Pt verbalized understanding. Health Maintenance Due   Topic Date Due    Shingrix Vaccine Age 50> (1 of 2) 01/07/1995    FOBT Q 1 YEAR AGE 50-75  01/07/1995    GLAUCOMA SCREENING Q2Y  01/07/2010       Pt goes to neurology.       Dr. Jennifer Hollis- out of [facility]

## 2020-01-03 NOTE — PROGRESS NOTES
Subjective:     Chief Complaint   Patient presents with    Establish Care        HPI:  Gerardo Dillard is a 76 y.o. male, here to re-establish care. Was followed by Dr Azalai Wong at [facility], last visit with them on 12/9/19, says only seen there one time, our practice is closer to his home  Previous to that he was followed by Dr Collette Heimlich who retired. Parkinson Disease:  Was followed by Dr Turner Burkitt (neurology), but he moved to [facility] and he has not seen local neurology since September. He started to be followed by a parkinson specialist in [facility], Dr Temi Pereira  via [facility] for his Parkinson Disease. Was told that he had elevated homocysteine levels which was likely related to vitamin b12 deficiency and  he needs B12 injections, once weekly for 8 weeks then needs to recheck homocysteine level and if less than 10 after 8 weeks then he could decrease B12 injections to biweekly indefinitely. He brings me copies of these labs with her suggestions. Denies any recent falls, he still drives and provides all his own care. He states he is eating and drinking well. No worsening of his tremors and PD symptoms. He is currently taking Sinemet 25/100 mg CR in the morning and Sinemet 25/100 mg 5 times a day. He stopped taking Amantadine as it was not helping his tremors. . Patient continues to do boxing 3 times a week as his formal therapy at [facility] specialized gym in the [address] [address]. He also does yoga several times a week.      Past Medical History:   Diagnosis Date    Mitral valve disorders(424.0) 11/19/2009    4/3/17 followed by PCP no cardiologist    Parkinson disease ([facility] [address]     Prostatism 11/19/2009    Skin cancer        Social History     Tobacco Use    Smoking status: Former Smoker    Smokeless tobacco: Never Used   Substance Use Topics    Alcohol use: Yes     Comment: Rarely     Drug use: No       Outpatient Medications Marked as Taking for the 1/3/20 encounter (Office Visit) with Sissy Sen, NP   Medication Sig Dispense Refill    cyanocobalamin 1,000 mcg tablet Take 1,000 mcg by mouth daily.  ubidecarenone (COQ-10 PO) Take  by mouth.  GLUTATHIONE 200 mg by Does Not Apply route three (3) times daily.  carbidopa-levodopa (SINEMET)  mg per tablet Take 1 tablet five times a day 450 Tab 1    cholecalciferol, VITAMIN D3, (VITAMIN D3) 5,000 unit tab tablet Take 5,000 Units by mouth two (2) times a week. No Known Allergies    Health Maintenance reviewed       ROS:  Gen: no fatigue, no fever, no chills, no unexplained weight loss or weight gain  Eyes: no excessive tearing, itching, or discharge  Nose: no rhinorrhea, no sinus pain  Mouth: no oral lesions, no sore throat, no difficulty swallowing  Resp: no shortness of breath, no wheezing, no cough  CV: no chest pain, no orthopnea, no paroxysmal nocturnal dyspnea, no lower extremity edema, no palpitations  Abd: no nausea, no heartburn, no diarrhea, no constipation, no abdominal pain  Neuro: no headaches, no syncope or presyncopal episodes  Endo: no polyuria, no polydipsia.     : no hematuria, no dysuria, no frequency, no incontinence  Heme: no lymphadenopathy, no easy bruising or bleeding, no night sweats  MSK: no joint pain or swelling    PE:  Visit Vitals  BP (!) 155/94 (BP 1 Location: Left arm, BP Patient Position: Sitting)   Pulse 98   Temp 98.1 °F (36.7 °C) (Oral)   Resp 16   Ht 5' 11.5\" (1.816 m)   Wt 175 lb 3.2 oz (79.5 kg)   SpO2 95%   BMI 24.09 kg/m²     Gen: alert, oriented, no acute distress  Head: normocephalic, atraumatic  Eyes: pupils equal round reactive to light, sclera clear, conjunctiva clear  Oral: moist mucus membranes, no oral lesions, no pharyngeal inflammation or exudate  Neck: symmetric normal sized thyroid, no carotid bruits, no jugular vein distention  Resp: no increase work of breathing, lungs clear to ausculation bilaterally, no wheezing, rales or rhonchi  CV: S1, S2 normal.  No murmurs, rubs, or gallops. Abd: soft, not tender, not distended. No hepatosplenomegaly. Normal bowel sounds. Skin: no lesion or rash  Extremities: no cyanosis or edema  Neuro:  Able to stand quickly from sitting. +shuffling, +arm swing. Resting right hand tremor. Speech is fluent with no aphasia or dysarthria      No results found for this visit on 01/03/20. Assessment/Plan:  Differential diagnosis and treatment options reviewed with patient who is in agreement with treatment plan as outlined below. ICD-10-CM ICD-9-CM    1. Homocysteine level above reference range R79.89 790.99 VITAMIN B12 INJECTION      THER/PROPH/DIAG INJECTION, SUBCUT/IM      cyanocobalamin (VITAMIN B-12) 1,000 mcg/mL injection   2. Vitamin B12 deficiency E53.8 266.2 VITAMIN B12 INJECTION      THER/PROPH/DIAG INJECTION, SUBCUT/IM      cyanocobalamin (VITAMIN B-12) 1,000 mcg/mL injection   3. Parkinson's disease (tremor, stiffness, slow motion, unstable posture) ([facility]) G20 332.0 VITAMIN B12 INJECTION      THER/PROPH/DIAG INJECTION, SUBCUT/IM      cyanocobalamin (VITAMIN B-12) 1,000 mcg/mL injection   4. [facility] ([facility] G20 332.0 carbidopa-levodopa ER (SINEMET CR)  mg per tablet     Will start him on weekly vitamin b12 injections x 8 weeks, I do not feel this is unreasonable given the support of his elevated homocysteine levels. Will have him follow up with me in 2 months and will have him come back weekly as nurse visit for B12 shots for 7 additional weeks then will do labs. He will need local neurologist as well. BP elevated today. He notes that his BP is normally at goal.  Will recheck next week at nurse visit. Discussed BMI and healthy weight. Encouraged patient to work to implement changes including diet high in raw fruits and vegetables, lean protein and good fats. Limit refined, processed carbohydrates and sugar. Encouraged regular exercise.  Recommended regular cardiovascular exercise 3-6 times per week for 30-60 minutes daily. I have discussed the diagnosis with the patient and the intended plan as seen in the above orders. The patient has received an after-visit summary and questions were answered concerning future plans. I have discussed medication side effects and warnings with the patient as well. The patient verbalizes understanding and agreement with the plan.

## 2020-01-10 ENCOUNTER — CLINICAL SUPPORT (OUTPATIENT)
Dept: FAMILY MEDICINE CLINIC | Age: 75
End: 2020-01-10

## 2020-01-10 VITALS
HEIGHT: 72 IN | DIASTOLIC BLOOD PRESSURE: 71 MMHG | WEIGHT: 175 LBS | TEMPERATURE: 97.8 F | SYSTOLIC BLOOD PRESSURE: 112 MMHG | BODY MASS INDEX: 23.7 KG/M2 | HEART RATE: 68 BPM | RESPIRATION RATE: 16 BRPM | OXYGEN SATURATION: 92 %

## 2020-01-10 DIAGNOSIS — E53.8 VITAMIN B12 DEFICIENCY: Primary | ICD-10-CM

## 2020-01-10 RX ORDER — CYANOCOBALAMIN 1000 UG/ML
1000 INJECTION, SOLUTION INTRAMUSCULAR; SUBCUTANEOUS ONCE
Qty: 1 ML | Refills: 0
Start: 2020-01-10 | End: 2020-01-10

## 2020-01-17 ENCOUNTER — CLINICAL SUPPORT (OUTPATIENT)
Dept: FAMILY MEDICINE CLINIC | Age: 75
End: 2020-01-17

## 2020-01-17 VITALS
HEIGHT: 72 IN | WEIGHT: 175 LBS | BODY MASS INDEX: 23.7 KG/M2 | SYSTOLIC BLOOD PRESSURE: 110 MMHG | RESPIRATION RATE: 16 BRPM | OXYGEN SATURATION: 93 % | TEMPERATURE: 97.6 F | HEART RATE: 67 BPM | DIASTOLIC BLOOD PRESSURE: 69 MMHG

## 2020-01-17 DIAGNOSIS — E53.8 VITAMIN B12 DEFICIENCY: Primary | ICD-10-CM

## 2020-01-17 RX ORDER — CYANOCOBALAMIN 1000 UG/ML
1000 INJECTION, SOLUTION INTRAMUSCULAR; SUBCUTANEOUS ONCE
Qty: 1 ML | Refills: 0
Start: 2020-01-17 | End: 2020-01-17

## 2020-01-17 NOTE — PROGRESS NOTES
Chief Complaint   Patient presents with    Injection B12     Lora Sawyer is a 76 y.o. male who presents for routine immunizations. He denies any symptoms , reactions or allergies that would exclude them from being immunized today. Risks and adverse reactions were discussed and the VIS was given to them. All questions were addressed. He was observed for 5 min post injection. There were no reactions observed. Compa Sutton LPN    Pt tolerated injection well. Injection today makes #3 dose of b12.

## 2020-01-24 ENCOUNTER — CLINICAL SUPPORT (OUTPATIENT)
Dept: FAMILY MEDICINE CLINIC | Age: 75
End: 2020-01-24

## 2020-01-24 VITALS
OXYGEN SATURATION: 93 % | SYSTOLIC BLOOD PRESSURE: 91 MMHG | WEIGHT: 175 LBS | TEMPERATURE: 97.9 F | BODY MASS INDEX: 23.7 KG/M2 | RESPIRATION RATE: 16 BRPM | HEIGHT: 72 IN | HEART RATE: 69 BPM | DIASTOLIC BLOOD PRESSURE: 53 MMHG

## 2020-01-24 DIAGNOSIS — E53.8 VITAMIN B12 DEFICIENCY: Primary | ICD-10-CM

## 2020-01-24 RX ORDER — CYANOCOBALAMIN 1000 UG/ML
1000 INJECTION, SOLUTION INTRAMUSCULAR; SUBCUTANEOUS ONCE
Qty: 1 ML | Refills: 0
Start: 2020-01-24 | End: 2020-01-24

## 2020-01-24 NOTE — PROGRESS NOTES
Chief Complaint   Patient presents with    Injection B12     Leonard Hoffman is a 76 y.o. male who presents for routine b12 injection. He denies any symptoms , reactions or allergies that would exclude them from being immunized today. Risks and adverse reactions were discussed and the VIS was given to them. All questions were addressed. He was observed for 5 min post injection. There were no reactions observed. Jorge A Nagel LPN      Charted immunization in [address] [place] [address]. Pt tolerated injection well, given IM in Left deltoid. Verbal and written instructions given to pt, pt verbalized understanding.

## 2020-01-31 ENCOUNTER — CLINICAL SUPPORT (OUTPATIENT)
Dept: FAMILY MEDICINE CLINIC | Age: 75
End: 2020-01-31

## 2020-01-31 VITALS
RESPIRATION RATE: 16 BRPM | SYSTOLIC BLOOD PRESSURE: 93 MMHG | BODY MASS INDEX: 23.7 KG/M2 | WEIGHT: 175 LBS | TEMPERATURE: 97.6 F | DIASTOLIC BLOOD PRESSURE: 55 MMHG | HEART RATE: 69 BPM | OXYGEN SATURATION: 95 % | HEIGHT: 72 IN

## 2020-01-31 DIAGNOSIS — E53.8 VITAMIN B12 DEFICIENCY: Primary | ICD-10-CM

## 2020-01-31 RX ORDER — CYANOCOBALAMIN 1000 UG/ML
1000 INJECTION, SOLUTION INTRAMUSCULAR; SUBCUTANEOUS ONCE
Qty: 1 ML | Refills: 0
Start: 2020-01-31 | End: 2020-01-31

## 2020-02-07 ENCOUNTER — CLINICAL SUPPORT (OUTPATIENT)
Dept: FAMILY MEDICINE CLINIC | Age: 75
End: 2020-02-07

## 2020-02-07 VITALS
HEIGHT: 72 IN | OXYGEN SATURATION: 91 % | RESPIRATION RATE: 12 BRPM | WEIGHT: 175 LBS | DIASTOLIC BLOOD PRESSURE: 68 MMHG | HEART RATE: 74 BPM | BODY MASS INDEX: 23.7 KG/M2 | SYSTOLIC BLOOD PRESSURE: 109 MMHG | TEMPERATURE: 97.8 F

## 2020-02-07 DIAGNOSIS — E53.8 VITAMIN B12 DEFICIENCY: Primary | ICD-10-CM

## 2020-02-07 RX ORDER — CYANOCOBALAMIN 1000 UG/ML
1000 INJECTION, SOLUTION INTRAMUSCULAR; SUBCUTANEOUS ONCE
Qty: 1 ML | Refills: 0
Start: 2020-02-07 | End: 2020-02-07

## 2020-02-07 NOTE — PROGRESS NOTES
Chief Complaint   Patient presents with    Injection B12     Gordo Chaparro is a 76 y.o. male who presents for routine b12 injection. He denies any symptoms , reactions or allergies that would exclude them from being immunized today. Risks and adverse reactions were discussed and the VIS was given to them. All questions were addressed. He was observed for 5 min post injection. There were no reactions observed.     Baiely Woods LPN

## 2020-02-14 ENCOUNTER — CLINICAL SUPPORT (OUTPATIENT)
Dept: FAMILY MEDICINE CLINIC | Age: 75
End: 2020-02-14

## 2020-02-14 VITALS
RESPIRATION RATE: 16 BRPM | TEMPERATURE: 97.9 F | DIASTOLIC BLOOD PRESSURE: 61 MMHG | OXYGEN SATURATION: 93 % | WEIGHT: 175 LBS | HEIGHT: 72 IN | HEART RATE: 71 BPM | SYSTOLIC BLOOD PRESSURE: 99 MMHG | BODY MASS INDEX: 23.7 KG/M2

## 2020-02-14 DIAGNOSIS — E53.8 VITAMIN B12 DEFICIENCY: Primary | ICD-10-CM

## 2020-02-14 RX ORDER — CYANOCOBALAMIN 1000 UG/ML
1000 INJECTION, SOLUTION INTRAMUSCULAR; SUBCUTANEOUS ONCE
Qty: 1 ML | Refills: 0
Start: 2020-02-14 | End: 2020-02-14

## 2020-02-21 ENCOUNTER — CLINICAL SUPPORT (OUTPATIENT)
Dept: FAMILY MEDICINE CLINIC | Age: 75
End: 2020-02-21

## 2020-02-21 VITALS
RESPIRATION RATE: 14 BRPM | DIASTOLIC BLOOD PRESSURE: 59 MMHG | BODY MASS INDEX: 23.7 KG/M2 | HEART RATE: 71 BPM | SYSTOLIC BLOOD PRESSURE: 93 MMHG | OXYGEN SATURATION: 93 % | TEMPERATURE: 97.7 F | HEIGHT: 72 IN | WEIGHT: 175 LBS

## 2020-02-21 DIAGNOSIS — E53.8 VITAMIN B12 DEFICIENCY: Primary | ICD-10-CM

## 2020-02-21 RX ORDER — CYANOCOBALAMIN 1000 UG/ML
1000 INJECTION, SOLUTION INTRAMUSCULAR; SUBCUTANEOUS ONCE
Qty: 1 ML | Refills: 0
Start: 2020-02-21 | End: 2020-02-21

## 2020-02-21 NOTE — PROGRESS NOTES
Anabel Terrell is a 76 y.o. male who presents for routine b12 injection. He denies any symptoms , reactions or allergies that would exclude them from being immunized today. Risks and adverse reactions were discussed and the VIS was given to them. All questions were addressed. He was observed for 5 min post injection. There were no reactions observed.

## 2020-02-24 ENCOUNTER — LAB ONLY (OUTPATIENT)
Dept: FAMILY MEDICINE CLINIC | Age: 75
End: 2020-02-24

## 2020-02-24 ENCOUNTER — HOSPITAL ENCOUNTER (OUTPATIENT)
Dept: LAB | Age: 75
Discharge: HOME | End: 2020-02-24
Payer: MEDICARE

## 2020-02-24 DIAGNOSIS — E53.8 B12 DEFICIENCY: ICD-10-CM

## 2020-02-24 DIAGNOSIS — R79.89 ELEVATED HOMOCYSTEINE: Primary | ICD-10-CM

## 2020-02-24 PROCEDURE — 36415 COLL VENOUS BLD VENIPUNCTURE: CPT

## 2020-02-24 PROCEDURE — 82607 VITAMIN B-12: CPT

## 2020-02-24 PROCEDURE — 83090 ASSAY OF HOMOCYSTEINE: CPT

## 2020-02-25 LAB
HCYS SERPL-SCNC: 14 UMOL/L (ref 0–19.2)
VIT B12 SERPL-MCNC: >2000 PG/ML (ref 232–1245)

## 2020-02-27 NOTE — PROGRESS NOTES
Last homocysteine level was 14.7 and now 14.0. Not much change with high dose vitamin b12 therapy. b12 level is very high.   Will discuss this with him at his follow up

## 2020-02-28 ENCOUNTER — OFFICE VISIT (OUTPATIENT)
Dept: FAMILY MEDICINE CLINIC | Age: 75
End: 2020-02-28

## 2020-02-28 VITALS
HEART RATE: 69 BPM | WEIGHT: 174 LBS | BODY MASS INDEX: 23.57 KG/M2 | RESPIRATION RATE: 16 BRPM | HEIGHT: 72 IN | OXYGEN SATURATION: 94 % | TEMPERATURE: 97.7 F | DIASTOLIC BLOOD PRESSURE: 61 MMHG | SYSTOLIC BLOOD PRESSURE: 96 MMHG

## 2020-02-28 DIAGNOSIS — G20 PARKINSON DISEASE (HCC): Primary | ICD-10-CM

## 2020-02-28 DIAGNOSIS — R79.89 ELEVATED HOMOCYSTEINE: ICD-10-CM

## 2020-02-28 NOTE — LETTER
2/28/2020 Mr. Cr [id number] Dear Varinder Long: 
 
Please find your most recent results below. Resulted Orders HOMOCYSTEINE, PLASMA (Collected: 2/24/2020  3:16 PM) Result Value Ref Range Homocysteine, plasma 14.0 0.0 - 19.2 umol/L Comment: **Please note reference interval change** Narrative Performed at:  [address]  [id number] : Luiza Silva MD, Phone:  [phone] VITAMIN B12 (Collected: 2/24/2020  3:16 PM) Result Value Ref Range Vitamin B12 >2000 (H) 232 - 1245 pg/mL Narrative Performed at:  [address]  [id number] : Luiza Silva MD, Phone:  [phone] RECOMMENDATIONS: 
Discussed at appointment Please call me if you have any questions: [phone] Fax # [phone] Sincerely, Clair Tanner NP

## 2020-02-28 NOTE — PATIENT INSTRUCTIONS
Parkinson's Disease: Care Instructions  Your Care Instructions  Parkinson's disease can cause tremors, stiffness, and problems with movement. Severe or advanced cases can also cause problems with thinking. In Parkinson's disease, part of the brain cannot make enough dopamine, a chemical that helps control movement. Taking your medicines correctly and getting regular exercise may help you maintain your quality of life. There are many things that can cause Parkinson's disease symptoms, including some medicine, some toxins, and trauma to the head. The cause in most cases is not known. Follow-up care is a key part of your treatment and safety. Be sure to make and go to all appointments, and call your doctor if you are having problems. It's also a good idea to know your test results and keep a list of the medicines you take. How can you care for yourself at home? General care  · Take your medicines exactly as prescribed. Call your doctor if you think you are having a problem with your medicine. · Make sure your home is safe:  ? Place furniture so that you have something to hold on to as you walk around the house. ? Use chairs that make it easier to sit down and stand up. ? Group the things you use most, such as reading glasses, keys, and the telephone, in one easy-to-reach place. ? Tack down rugs so that you do not trip. ? Put no-slip tape and handrails in the tub to prevent falls. · Use a cane, walker, or scooter if your doctor suggests it. · Keep up your normal activities as much as you can. · Find ways to manage stress, which can make symptoms worse. · Spend time with family and friends. Join a support group for people with Parkinson's disease if you want extra help. · Depression is common with this condition. Tell your doctor if you have trouble sleeping, are eating too much or are not hungry, or feel sad or tearful all the time. Depression can be treated with medicine and counseling.   Diet and exercise  · Eat a balanced diet. · If you are taking levodopa, do not eat protein at the same time you take your medicine. Levodopa may not work as well if you take it at the same time you eat protein. You can eat normal amounts of protein. Talk to your doctor if you have questions. · If you have problems swallowing, change how and what you eat:  ? Try thick drinks, such as milk shakes. They are easier to swallow than other fluids. ? Do not eat foods that crumble easily. These can cause choking. ? Use a  to prepare food. Soft foods need less chewing. ? Eat small meals often so that you do not get tired from eating heavy meals. · Drink plenty of water and eat a high-fiber diet to prevent constipation. Parkinson's--and the medicines that treat it--may slow your intestines. · Get exercise on most days. Work with your doctor to set up a program of walking, swimming, or other exercise you are able to do. When should you call for help? Call your doctor now or seek immediate medical care if:    · You have a change in your symptoms.     · You develop other problems from your condition, such as:  ? Injury from a fall. ? Thinking or memory problems. ? A urinary tract infection (burning pain when urinating).    Watch closely for changes in your health, and be sure to contact your doctor if:    · You lose weight because of problems with eating.     · You want more information about your condition or your medicines. Where can you learn more? Go to http://adrian-mitchel.info/. Enter E079 in the search box to learn more about \"Parkinson's Disease: Care Instructions. \"  Current as of: March 28, 2019  Content Version: 12.2  © 2854-8440 [employer]. Care instructions adapted under license by [employer] (which disclaims liability or warranty for this information).  If you have questions about a medical condition or this instruction, always ask your healthcare professional. [employer] disclaims any warranty or liability for your use of this information.

## 2020-02-28 NOTE — PROGRESS NOTES
Chief Complaint   Patient presents with    Other     Discuss b12 results      1. Have you been to the ER, urgent care clinic since your last visit? Hospitalized since your last visit? No    2. Have you seen or consulted any other health care providers outside of the [address] [place] [address] since your last visit? Include any pap smears or colon screening.  No      Health Maintenance Due   Topic Date Due    Shingrix Vaccine Age 49> (1 of 2) 01/07/1995    GLAUCOMA SCREENING Q2Y  01/07/2010

## 2020-02-28 NOTE — PROGRESS NOTES
Subjective:     Chief Complaint   Patient presents with    Other     Discuss b12 results         HPI:  Racheal Negro is a 76 y.o. male presents for follow up appointment. Has parkinsons disease, has been under treatment by specialist who told him that he had elevated homocysteine levels which was likely related to vitamin b12 deficiency and  he needs B12 injections, once weekly for 8 weeks then needed to recheck homocysteine level and if less than 10 after 8 weeks then he could decrease B12 injections to biweekly indefinitely. He completed his 8th week of B12 weekly injections on 2/21/2020 and came in for labs on 2/24/2020. Here to discuss these results. He does not particularly note any change since doing the vitamin b12   He has tele medicine appointment with specialist in two weeks. Has appointment with [facility] neurologist at [facility] in July. Denies any recent falls, he still drives and provides all his own care. He states he is eating and drinking well. Patient continues to do boxing 3 times a week as his formal therapy at [facility] specialized gym in the [address]. He also does yoga several times a week. No hospital, ER or specialist visits since last primary care visit except as noted above. Past Medical History:   Diagnosis Date    Mitral valve disorders(424.0) 11/19/2009    4/3/17 followed by PCP no cardiologist    Parkinson disease ([facility] [address]     Prostatism 11/19/2009    Skin cancer        Social History     Tobacco Use    Smoking status: Former Smoker    Smokeless tobacco: Never Used   Substance Use Topics    Alcohol use: Yes     Comment: Rarely     Drug use: No       Outpatient Medications Marked as Taking for the 2/28/20 encounter (Office Visit) with Sissy Sen NP   Medication Sig Dispense Refill    ubidecarenone (COQ-10 PO) Take  by mouth.  carbidopa-levodopa ER (SINEMET CR)  mg per tablet Take 1 Tab by mouth daily.  90 Tab 1    GLUTATHIONE 200 mg by Does Not Apply route three (3) times daily.  carbidopa-levodopa (SINEMET)  mg per tablet Take 1 tablet five times a day 450 Tab 1    polyethylene glycol (MIRALAX) 17 gram packet Take 17 g by mouth daily as needed.  cholecalciferol, VITAMIN D3, (VITAMIN D3) 5,000 unit tab tablet Take 5,000 Units by mouth two (2) times a week. No Known Allergies    Health Maintenance reviewed -      ROS:  Gen: no fatigue, no fever, no chills, no unexplained weight loss or weight gain  Eyes: no excessive tearing, itching, or discharge  Nose: no rhinorrhea, no sinus pain  Mouth: no oral lesions, no sore throat, no difficulty swallowing  Resp: no shortness of breath, no wheezing, no cough  CV: no chest pain, no orthopnea, no paroxysmal nocturnal dyspnea, no lower extremity edema, no palpitations  Abd: no nausea, no heartburn, no diarrhea, no constipation, no abdominal pain  Neuro: no headaches, no syncope or presyncopal episodes  Endo: no polyuria, no polydipsia. : no hematuria, no dysuria, no frequency, no incontinence  Heme: no lymphadenopathy, no easy bruising or bleeding, no night sweats  MSK: no joint pain or swelling    PE:  Visit Vitals  BP 96/61 (BP 1 Location: Left arm, BP Patient Position: Sitting)   Pulse 69   Temp 97.7 °F (36.5 °C) (Oral)   Resp 16   Ht 5' 11.5\" (1.816 m)   Wt 174 lb (78.9 kg)   SpO2 94%   BMI 23.93 kg/m²     Gen: alert, oriented, no acute distress  Head: normocephalic, atraumatic  Ears: external auditory canals clear, TMs without erythema or effusion  Eyes: pupils equal round reactive to light, sclera clear, conjunctiva clear  Oral: moist mucus membranes, no oral lesions, no pharyngeal inflammation or exudate  Neck: symmetric normal sized thyroid, no carotid bruits, no jugular vein distention  Resp: no increase work of breathing, lungs clear to ausculation bilaterally, no wheezing, rales or rhonchi  CV: S1, S2 normal.  No murmurs, rubs, or gallops.     Abd: soft, not tender, not distended. No hepatosplenomegaly. Normal bowel sounds. No hernias. No abdominal or renal bruits. Neuro:  Able to stand quickly from sitting. +shuffling, +arm swing. Resting right hand tremor. Speech is fluent with no aphasia or dysarthria. Oriented x4  Skin: no lesion or rash  Extremities: no cyanosis or edema    No results found for this visit on 02/28/20. Assessment/Plan:  Differential diagnosis and treatment options reviewed with patient who is in agreement with treatment plan as outlined below. ICD-10-CM ICD-9-CM    1. Parkinson disease ([address] G20 332.0    2. Elevated homocysteine ([facility]) E72.11 270.4      Long discussion regarding patient's B12 and homocystine results. B12 level is very high, greater than 2000. Homocystine levels did not really improve very much only decreased by 0.7. At this time, I do not feel it is appropriate continue weekly B12 injections as he has not noted any improvement in any of his symptoms in his homocystine level did not really improve given the 8 weeks of high-dose therapy. He has a tele-med appointment upcoming with his special.  We will follow-up after he has this appointment to discuss her recommendations. I have given him a copy of his labs along with my office phone and fax number should the specialist feel the need to get in contact with me. May consider doing once monthly vitamin B12 injections, should probably do repeat B12 level at follow-up. Discussed BMI and healthy weight. Encouraged patient to work to implement changes including diet high in raw fruits and vegetables, lean protein and good fats. Limit refined, processed carbohydrates and sugar. Encouraged regular exercise. Recommended regular cardiovascular exercise 3-6 times per week for 30-60 minutes daily. I have discussed the diagnosis with the patient and the intended plan as seen in the above orders.   The patient has received an after-visit summary and questions were answered concerning future plans. I have discussed medication side effects and warnings with the patient as well. The patient verbalizes understanding and agreement with the plan.

## 2020-03-10 DIAGNOSIS — G20 PARKINSON DISEASE (HCC): ICD-10-CM

## 2020-03-11 ENCOUNTER — TELEPHONE (OUTPATIENT)
Dept: NEUROLOGY | Age: 75
End: 2020-03-11

## 2020-03-11 NOTE — TELEPHONE ENCOUNTER
Contacted the patient and left VM message asking for a return call so we can schedule a follow up appt

## 2020-03-13 RX ORDER — CARBIDOPA AND LEVODOPA 25; 100 MG/1; MG/1
TABLET ORAL
Qty: 450 TAB | Refills: 0 | Status: SHIPPED | OUTPATIENT
Start: 2020-03-13 | End: 2020-03-16 | Stop reason: SDUPTHER

## 2020-03-16 DIAGNOSIS — G20 PARKINSON DISEASE (HCC): ICD-10-CM

## 2020-03-16 RX ORDER — CARBIDOPA AND LEVODOPA 25; 100 MG/1; MG/1
TABLET ORAL
Qty: 540 TAB | Refills: 0 | Status: SHIPPED | OUTPATIENT
Start: 2020-03-16 | End: 2020-04-28 | Stop reason: ALTCHOICE

## 2020-03-16 NOTE — TELEPHONE ENCOUNTER
Future Appointments   Date Time Provider [place]   3/17/2020  2:30 PM Sly Sen, KENYON [address]   3/26/2020  1:00 PM Mariama Rehman  [facility]   6/9/2020  3:00 PM Noel Tucker MD [facility]                         Last Appointment My Department:  9/23/2019    Please advise of refill below.   Requested Prescriptions     Pending Prescriptions Disp Refills    carbidopa-levodopa (SINEMET)  mg per tablet 540 Tab 0     Sig: Pt taking 6 tabs daily

## 2020-03-16 NOTE — TELEPHONE ENCOUNTER
----- Message from Theresa Sherwood sent at 3/16/2020  2:38 PM EDT -----  Regarding: Dr Deleon/rx refill   Medication Refill    Caller (if not patient):      Relationship of caller (if not patient):      Best contact number(s):[phone]      Name of medication and dosage if known: carbidopa levodopa , for 3 months supply      Is patient out of this medication (yes/no):running low      Pharmacy name:[facility]    Pharmacy listed in chart? (yes/no):yes  Pharmacy phone number:number listed in his chart per pt      Details to clarify the request: he is a former pt of Dr Saira Sterling, and would like to stay at the [facility] location. , pt is scheduled to come in on 3/26/2020 for his follow up visit and med check      Theresa Sherwood

## 2020-03-17 ENCOUNTER — OFFICE VISIT (OUTPATIENT)
Dept: FAMILY MEDICINE CLINIC | Age: 75
End: 2020-03-17

## 2020-03-17 VITALS
TEMPERATURE: 97.7 F | BODY MASS INDEX: 23.57 KG/M2 | OXYGEN SATURATION: 92 % | DIASTOLIC BLOOD PRESSURE: 64 MMHG | WEIGHT: 174 LBS | RESPIRATION RATE: 16 BRPM | SYSTOLIC BLOOD PRESSURE: 104 MMHG | HEART RATE: 77 BPM | HEIGHT: 72 IN

## 2020-03-17 DIAGNOSIS — G20 PARKINSON DISEASE (HCC): Primary | ICD-10-CM

## 2020-03-17 DIAGNOSIS — R79.89 ELEVATED HOMOCYSTEINE: ICD-10-CM

## 2020-03-17 NOTE — PATIENT INSTRUCTIONS
Parkinson's Disease: Care Instructions  Your Care Instructions  Parkinson's disease can cause tremors, stiffness, and problems with movement. Severe or advanced cases can also cause problems with thinking. In Parkinson's disease, part of the brain cannot make enough dopamine, a chemical that helps control movement. Taking your medicines correctly and getting regular exercise may help you maintain your quality of life. There are many things that can cause Parkinson's disease symptoms, including some medicine, some toxins, and trauma to the head. The cause in most cases is not known. Follow-up care is a key part of your treatment and safety. Be sure to make and go to all appointments, and call your doctor if you are having problems. It's also a good idea to know your test results and keep a list of the medicines you take. How can you care for yourself at home? General care  · Take your medicines exactly as prescribed. Call your doctor if you think you are having a problem with your medicine. · Make sure your home is safe:  ? Place furniture so that you have something to hold on to as you walk around the house. ? Use chairs that make it easier to sit down and stand up. ? Group the things you use most, such as reading glasses, keys, and the telephone, in one easy-to-reach place. ? Tack down rugs so that you do not trip. ? Put no-slip tape and handrails in the tub to prevent falls. · Use a cane, walker, or scooter if your doctor suggests it. · Keep up your normal activities as much as you can. · Find ways to manage stress, which can make symptoms worse. · Spend time with family and friends. Join a support group for people with Parkinson's disease if you want extra help. · Depression is common with this condition. Tell your doctor if you have trouble sleeping, are eating too much or are not hungry, or feel sad or tearful all the time. Depression can be treated with medicine and counseling.   Diet and exercise  · Eat a balanced diet. · If you are taking levodopa, do not eat protein at the same time you take your medicine. Levodopa may not work as well if you take it at the same time you eat protein. You can eat normal amounts of protein. Talk to your doctor if you have questions. · If you have problems swallowing, change how and what you eat:  ? Try thick drinks, such as milk shakes. They are easier to swallow than other fluids. ? Do not eat foods that crumble easily. These can cause choking. ? Use a  to prepare food. Soft foods need less chewing. ? Eat small meals often so that you do not get tired from eating heavy meals. · Drink plenty of water and eat a high-fiber diet to prevent constipation. Parkinson's--and the medicines that treat it--may slow your intestines. · Get exercise on most days. Work with your doctor to set up a program of walking, swimming, or other exercise you are able to do. When should you call for help? Call your doctor now or seek immediate medical care if:    · You have a change in your symptoms.     · You develop other problems from your condition, such as:  ? Injury from a fall. ? Thinking or memory problems. ? A urinary tract infection (burning pain when urinating).    Watch closely for changes in your health, and be sure to contact your doctor if:    · You lose weight because of problems with eating.     · You want more information about your condition or your medicines. Where can you learn more? Go to http://adrian-mitchel.info/  Enter N399 in the search box to learn more about \"Parkinson's Disease: Care Instructions. \"  Current as of: November 19, 2019Content Version: 12.4  © 1558-9692 Healthwise, Incorporated. Care instructions adapted under license by [employer] (which disclaims liability or warranty for this information).  If you have questions about a medical condition or this instruction, always ask your healthcare professional. [employer] disclaims any warranty or liability for your use of this information.

## 2020-03-17 NOTE — PROGRESS NOTES
Subjective:     Chief Complaint   Patient presents with    Medication Evaluation        HPI:  76 y.o.  presents for follow up appointment. He had his telemed follow up with telemed parkinson specialist.  He feels well today and has no complaints. Was doing b12 weekly injections to try to get his homocystine levels down, goal is to have levels below 10. We did 8 weeks of weekly b12 injections and repeated labs, no improvement in his homocysteine levels but did result in very high b12 levels. I told him to hold the b12 for now and he was to discuss the results with the specialist and today he is here to discuss her input. She agreed that his b12 was too high, advised to hold for 2 months then restart monthly injections as well. She put him on a supplement (1mg of 5-MTHF which is Levomefolic acid or bioactive form of folate) and wants to have him recheck homocysteine level in 2months. He has ordered this and should arrive tomorrow. Manganese- he had his well water tested and was normal but specialist still thinks that the levels may be elevated because his hair test was 3 standard deviations above the mean and his wife's hair levels are more than twice as high than his. She has ordered repeat testing and he will have done at [address] [place] [address].  He has follow up with telemed specialist on 5/28/2020. Has follow up scheduled with neurology on 3/26/2020. Denies any recent falls, he still drives and provides all his own care. [facility] states he is eating and drinking well. Patient continues to do boxing 3 times a week as his formal therapy [facility]. He also does yoga several times a week.       No hospital, ER or specialist visits since last primary care visit except as noted above.     Past Medical History:   Diagnosis Date    Mitral valve disorders(424.0) 11/19/2009    4/3/17 followed by PCP no cardiologist    Parkinson disease ([address]     Prostatism 11/19/2009    Skin cancer Social History     Tobacco Use    Smoking status: Former Smoker    Smokeless tobacco: Never Used   Substance Use Topics    Alcohol use: Yes     Comment: Rarely     Drug use: No       Outpatient Medications Marked as Taking for the 3/17/20 encounter (Office Visit) with Brenda Vitale NP   Medication Sig Dispense Refill    turmeric (CURCUMIN) by Does Not Apply route.  ubidecarenone (COQ-10 PO) Take  by mouth.  carbidopa-levodopa ER (SINEMET CR)  mg per tablet Take 1 Tab by mouth daily. 90 Tab 1    GLUTATHIONE 200 mg by Does Not Apply route three (3) times daily.  polyethylene glycol (MIRALAX) 17 gram packet Take 17 g by mouth daily as needed.  cholecalciferol, VITAMIN D3, (VITAMIN D3) 5,000 unit tab tablet Take 5,000 Units by mouth two (2) times a week. No Known Allergies    Health Maintenance reviewed      ROS:  Gen: no fatigue, no fever, no chills, no unexplained weight loss or weight gain  Eyes: no excessive tearing, itching, or discharge  Nose: no rhinorrhea, no sinus pain  Mouth: no oral lesions, no sore throat, no difficulty swallowing  Resp: no shortness of breath, no wheezing, no cough  CV: no chest pain, no orthopnea, no paroxysmal nocturnal dyspnea, no lower extremity edema, no palpitations  Abd: no nausea, no heartburn, no diarrhea, no constipation, no abdominal pain  Neuro: no headaches, no syncope or presyncopal episodes  Endo: no polyuria, no polydipsia.     : no hematuria, no dysuria, no frequency, no incontinence  Heme: no lymphadenopathy, no easy bruising or bleeding, no night sweats  MSK: no joint pain or swelling    PE:  Visit Vitals  /64 (BP 1 Location: Left arm, BP Patient Position: Sitting)   Pulse 77   Temp 97.7 °F (36.5 °C) (Oral)   Resp 16   Ht 5' 11.5\" (1.816 m)   Wt 174 lb (78.9 kg)   SpO2 92%   BMI 23.93 kg/m²     Gen: alert, oriented, no acute distress  Head: normocephalic, atraumatic  Oral: moist mucus membranes, no oral lesions, no pharyngeal inflammation or exudate  Neck: symmetric normal sized thyroid, no carotid bruits, no jugular vein distention  Resp: no increase work of breathing, lungs clear to ausculation bilaterally, no wheezing, rales or rhonchi  CV: S1, S2 normal.  No murmurs, rubs, or gallops. Abd: soft, not tender, not distended. No hepatosplenomegaly. Normal bowel sounds. No hernias. No abdominal or renal bruits. Neuro:  Able to stand quickly from sitting. +shuffling, +arm swing.  Resting mild right hand tremor. Speech is fluent with no aphasia or dysarthria. Oriented x4  Skin: no lesion or rash  Extremities: no cyanosis or edema    No results found for this visit on 03/17/20. Assessment/Plan:  Differential diagnosis and treatment options reviewed with patient who is in agreement with treatment plan as outlined below. ICD-10-CM ICD-9-CM    1. Parkinson disease ([facility] [address] G20 332.0 MANGANESE, WB      MANGANESE, UR      MANGANESE      CBC WITH AUTOMATED DIFF   2. Elevated homocysteine ([facility]) E72.11 270.4 MANGANESE, WB      MANGANESE, UR      MANGANESE     Gave him lab orders for him to have the labs requested by specialist done at [address] [place] [address].    He will start new supplement and follow up with me in 2 months, will come week before for homocysteine, b12 level. Has follow up with neurology next week. Discussed BMI and healthy weight. Encouraged patient to work to implement changes including diet high in raw fruits and vegetables, lean protein and good fats. Limit refined, processed carbohydrates and sugar. Encouraged regular exercise. I have discussed the diagnosis with the patient and the intended plan as seen in the above orders. The patient has received an after-visit summary and questions were answered concerning future plans. I have discussed medication side effects and warnings with the patient as well. The patient verbalizes understanding and agreement with the plan.

## 2020-03-17 NOTE — PROGRESS NOTES
Chief Complaint   Patient presents with    Medication Evaluation     1. Have you been to the ER, urgent care clinic since your last visit? Hospitalized since your last visit? No    2. Have you seen or consulted any other health care providers outside of the [address] [place] [address] since your last visit? Include any pap smears or colon screening. Yes When: Pt saw urology     Health maintenance reviewed. Pt informed of health maintenance past due and/or upcoming. Pt verbalized understanding. Health Maintenance Due   Topic Date Due    Shingrix Vaccine Age 49> (1 of 2) 01/07/1995    GLAUCOMA SCREENING Q2Y  01/07/2010     Pt is not fasting this visit.

## 2020-04-17 ENCOUNTER — HOSPITAL ENCOUNTER (OUTPATIENT)
Dept: LAB | Age: 75
Discharge: HOME | End: 2020-04-17
Payer: MEDICARE

## 2020-04-17 PROCEDURE — 83785 ASSAY OF MANGANESE: CPT

## 2020-04-17 PROCEDURE — 36415 COLL VENOUS BLD VENIPUNCTURE: CPT

## 2020-04-17 PROCEDURE — 85025 COMPLETE CBC W/AUTO DIFF WBC: CPT

## 2020-04-20 LAB
BASOPHILS # BLD AUTO: 0 X10E3/UL (ref 0–0.2)
BASOPHILS NFR BLD AUTO: 0 %
CREAT UR-MCNC: 0.74 G/L (ref 0.3–3)
EOSINOPHIL # BLD AUTO: 0.2 X10E3/UL (ref 0–0.4)
EOSINOPHIL NFR BLD AUTO: 3 %
ERYTHROCYTE [DISTWIDTH] IN BLOOD BY AUTOMATED COUNT: 12.6 % (ref 11.6–15.4)
HCT VFR BLD AUTO: 42.5 % (ref 37.5–51)
HGB BLD-MCNC: 14.2 G/DL (ref 13–17.7)
IMM GRANULOCYTES # BLD AUTO: 0 X10E3/UL (ref 0–0.1)
IMM GRANULOCYTES NFR BLD AUTO: 0 %
LYMPHOCYTES # BLD AUTO: 1.7 X10E3/UL (ref 0.7–3.1)
LYMPHOCYTES NFR BLD AUTO: 24 %
MANGANESE BLD-MCNC: 13.7 UG/L (ref 8–18.7)
MANGANESE SERPL-MCNC: NORMAL UG/L
MANGANESE [MASS/VOLUME] IN URINE: NORMAL UG/L (ref 0–2.9)
MANGANESE/CREATININE [MASS RATIO] IN URINE: NORMAL UG/G CREAT
MCH RBC QN AUTO: 32 PG (ref 26.6–33)
MCHC RBC AUTO-ENTMCNC: 33.4 G/DL (ref 31.5–35.7)
MCV RBC AUTO: 96 FL (ref 79–97)
MONOCYTES # BLD AUTO: 0.6 X10E3/UL (ref 0.1–0.9)
MONOCYTES NFR BLD AUTO: 9 %
NEUTROPHILS # BLD AUTO: 4.3 X10E3/UL (ref 1.4–7)
NEUTROPHILS NFR BLD AUTO: 64 %
PLATELET # BLD AUTO: 175 X10E3/UL (ref 150–450)
RBC # BLD AUTO: 4.44 X10E6/UL (ref 4.14–5.8)
WBC # BLD AUTO: 6.8 X10E3/UL (ref 3.4–10.8)

## 2020-04-21 ENCOUNTER — VIRTUAL VISIT (OUTPATIENT)
Dept: FAMILY MEDICINE CLINIC | Age: 75
End: 2020-04-21

## 2020-04-21 VITALS — WEIGHT: 174 LBS | HEIGHT: 72 IN | BODY MASS INDEX: 23.57 KG/M2

## 2020-04-21 VITALS — HEIGHT: 72 IN | WEIGHT: 174 LBS | BODY MASS INDEX: 23.57 KG/M2

## 2020-04-21 DIAGNOSIS — H60.391 OTHER INFECTIVE ACUTE OTITIS EXTERNA OF RIGHT EAR: Primary | ICD-10-CM

## 2020-04-21 RX ORDER — CIPROFLOXACIN AND DEXAMETHASONE 3; 1 MG/ML; MG/ML
4 SUSPENSION/ DROPS AURICULAR (OTIC) 2 TIMES DAILY
Qty: 7.5 ML | Refills: 0 | Status: SHIPPED | OUTPATIENT
Start: 2020-04-21 | End: 2020-04-28

## 2020-04-21 NOTE — PROGRESS NOTES
Chief Complaint   Patient presents with    Jaw Pain    Ear Pain     right     1. Have you been to the ER, urgent care clinic since your last visit? Hospitalized since your last visit? No    2. Have you seen or consulted any other health care providers outside of the [address] [place] [address] since your last visit? Include any pap smears or colon screening. No    Health maintenance reviewed. Pt informed of health maintenance past due and/or upcoming. Pt verbalized understanding.      Health Maintenance Due   Topic Date Due    Shingrix Vaccine Age 49> (1 of 2) 01/07/1995    GLAUCOMA SCREENING Q2Y  01/07/2010

## 2020-04-21 NOTE — PROGRESS NOTES
Chief Complaint   Patient presents with    Jaw Pain    Ear Pain     right     1. Have you been to the ER, urgent care clinic since your last visit? Hospitalized since your last visit? No    2. Have you seen or consulted any other health care providers outside of the [address] [place] [address] since your last visit? Include any pap smears or colon screening. No    Health maintenance reviewed. Pt informed of health maintenance past due and/or upcoming. Pt verbalized understanding.      Health Maintenance Due   Topic Date Due    Shingrix Vaccine Age 49> (1 of 2) 01/07/1995    GLAUCOMA SCREENING Q2Y  01/07/2010

## 2020-04-21 NOTE — PROGRESS NOTES
Cbc looks good. Other labs that he requested by his specialist appear normal.  Please mail him a copy of these labs so he can review with his specialists.

## 2020-04-21 NOTE — PROGRESS NOTES
Cha Lopez is a 76 y.o. male evaluated via telephone on 4/21/2020. Tried to access MyChart and Doxy. me and not successful, switched to phone call. Consent:  He and/or health care decision maker is aware that that he may receive a bill for this telephone service, depending on his insurance coverage, and has provided verbal consent to proceed: Yes      Documentation:  I communicated with the patient and/or health care decision maker about right ear discomfort. Details of this discussion including any medical advice provided: see below. Chief Complaint   Patient presents with    Jaw Pain    Ear Pain     right       Complains of right ear being \"tender\" for the past couple days as well as his right jaw, says that the ear aches on inside but more tender to touch or pull on it. He denies any redness or swelling to the ear but hurts some when he pulls on his ear. Hearing is a little muffled in that ear, he wonders if he has wax build up in his ear, says a long time ago he had to have his ears flushed. He has a history of swimmers ear in the past but says he has not swam in a long time. Denies fever, cough or nasal congestion. Denies any ear drainage. He notes that today the tenderness is better. He does not think that his teeth are problem, no pain with  with chewing. He denies jaw popping. He denies any rash . He says he took a little aspirin for the pain and that seems to help with discomfort. He denies any injury or dizziness. ICD-10-CM ICD-9-CM    1. Other infective acute otitis externa of right ear H60.391 380.10 ciprofloxacin-dexamethasone (CIPRODEX) 0.3-0.1 % otic suspension     I am unable to visualize his ear or ear canal, he has no other URI symptoms and no symptoms of cellulitis. Will treat as though he has outer ear infection with antibiotic/steroid drop. If no improvement or worsening he will let me know. [facility]. I affirm this is a Patient Initiated Episode with an Established Patient who has not had a related appointment within my department in the past 7 days or scheduled within the next 24 hours.     Total Time: minutes: 5-10 minutes    Note: not billable if this call serves to triage the patient into an appointment for the relevant concern      Dequan Caldera NP

## 2020-04-22 ENCOUNTER — OFFICE VISIT (OUTPATIENT)
Dept: INTERNAL MEDICINE CLINIC | Age: 75
End: 2020-04-22

## 2020-04-22 ENCOUNTER — TELEPHONE (OUTPATIENT)
Dept: FAMILY MEDICINE CLINIC | Age: 75
End: 2020-04-22

## 2020-04-22 VITALS
WEIGHT: 174.3 LBS | OXYGEN SATURATION: 92 % | DIASTOLIC BLOOD PRESSURE: 86 MMHG | SYSTOLIC BLOOD PRESSURE: 127 MMHG | TEMPERATURE: 98 F | BODY MASS INDEX: 24.4 KG/M2 | HEIGHT: 71 IN | HEART RATE: 80 BPM

## 2020-04-22 DIAGNOSIS — H61.21 IMPACTED CERUMEN OF RIGHT EAR: ICD-10-CM

## 2020-04-22 DIAGNOSIS — H60.311 ACUTE DIFFUSE OTITIS EXTERNA OF RIGHT EAR: ICD-10-CM

## 2020-04-22 DIAGNOSIS — L03.211 CELLULITIS OF FACE: Primary | ICD-10-CM

## 2020-04-22 RX ORDER — SULFAMETHOXAZOLE AND TRIMETHOPRIM 800; 160 MG/1; MG/1
1 TABLET ORAL 2 TIMES DAILY
Qty: 20 TAB | Refills: 0 | Status: SHIPPED | OUTPATIENT
Start: 2020-04-22 | End: 2020-04-28 | Stop reason: ALTCHOICE

## 2020-04-22 NOTE — TELEPHONE ENCOUNTER
verified. Patient states that he woke up this am with redness and swollen jaw on right side. Denies fever. No other symptoms. Denies jaw pain, but discomfort. Request ABT for jaw.

## 2020-04-22 NOTE — TELEPHONE ENCOUNTER
Message from Betsy Sen/telephone   Received: Today   Message Contents   Destiny Priest 86 Office Pool             Pt is requesting a Rx for right jaw pain and swelling. Pts number is [phone] and [facility] number is [phone].

## 2020-04-22 NOTE — TELEPHONE ENCOUNTER
Called pt, verified name and . Informed pt that per Sissy Please call patient and let him know that I recommend that he go to ER for further evaluation. I worry that he may have infection for the parotid gland given his location of pain and swelling/redness. Treatment of parotitisusually requires the need to have imaging done of that area to rule out abscess and IV antibiotics to treat as the infection can get into the deep spaces of his head and neck and oral antibiotics will not react quick enough. Pt stated a lot of concern about having to go to the ER and he had a difficult time understanding why IV abx were so important. He stated that he would like to speak w/Sissy and he thought that he'd feel better if she could tell him why she can't just call him in something.

## 2020-04-22 NOTE — PATIENT INSTRUCTIONS
Earwax Blockage: Care Instructions Your Care Instructions Earwax is a natural substance that protects the ear canal. Normally, earwax drains from the ears and does not cause problems. Sometimes earwax builds up and hardens. Earwax blockage (also called cerumen impaction) can cause some loss of hearing and pain. When wax is tightly packed, you will need to have your doctor remove it. Follow-up care is a key part of your treatment and safety. Be sure to make and go to all appointments, and call your doctor if you are having problems. It's also a good idea to know your test results and keep a list of the medicines you take. How can you care for yourself at home? · Do not try to remove earwax with cotton swabs, fingers, or other objects. This can make the blockage worse and damage the eardrum. · If your doctor recommends that you try to remove earwax at home: ? Soften and loosen the earwax with warm mineral oil. You also can try hydrogen peroxide mixed with an equal amount of room temperature water. Place 2 drops of the fluid, warmed to body temperature, in the ear two times a day for up to 5 days. ? Once the wax is loose and soft, all that is usually needed to remove it from the ear canal is a gentle, warm shower. Direct the water into the ear, then tip your head to let the earwax drain out. Dry your ear thoroughly with a hair dryer set on low. Hold the dryer several inches from your ear. ? If the warm mineral oil and shower do not work, use an over-the-counter wax softener. Read and follow all instructions on the label. After using the wax softener, use an ear syringe to gently flush the ear. Make sure the flushing solution is body temperature. Cool or hot fluids in the ear can cause dizziness. When should you call for help? Call your doctor now or seek immediate medical care if: 
  · Pus or blood drains from your ear.  
  · Your ears are ringing or feel full.  
  · You have a loss of hearing.  Watch closely for changes in your health, and be sure to contact your doctor if: 
  · You have pain or reduced hearing after 1 week of home treatment.  
  · You have any new symptoms, such as nausea or balance problems. Where can you learn more? Go to http://adrian-mitchel.info/ Enter A773 in the search box to learn more about \"Earwax Blockage: Care Instructions. \" Current as of: June 26, 2019Content Version: 12.4 © 2076-5314 Healthwise, Incorporated. Care instructions adapted under license by [employer] (which disclaims liability or warranty for this information). If you have questions about a medical condition or this instruction, always ask your healthcare professional. [address] any warranty or liability for your use of this information.

## 2020-04-22 NOTE — TELEPHONE ENCOUNTER
----- Message from Belkis Mcdonald sent at 4/22/2020 11:56 AM EDT -----  Regarding: Polo/telephone  Pt is returning a call from today. Pts number is [phone].

## 2020-04-22 NOTE — PROGRESS NOTES
Chief Complaint   Patient presents with    Jaw Swelling      right swollen jaw and ear, started yestarday. 1. Have you been to the ER, urgent care clinic since your last visit? Hospitalized since your last visit? No    2. Have you seen or consulted any other health care providers outside of the [address] [place] [address] since your last visit? Include any pap smears or colon screening.  No

## 2020-04-22 NOTE — TELEPHONE ENCOUNTER
I understand his concerns regarding going to ER. He really needs to be seen in person today, to assess this area of his concern. He can try lemon drops or something sour to stimulate that gland as well but will need antibiotic, I just need him to be evaluated in person to see if he needs imaging and IV antimitotic or if PO antibiotic will be sufficient. I called and spoke to [address] in [place], got him scheduled to be seen today at 3:50 pm with Dr Lynne Nearbrielle. I have tried to call Mr Mari Ortega 3 times and keep getting his machine. I left a message for him to return my call regarding evaluation that I have set up.

## 2020-04-22 NOTE — TELEPHONE ENCOUNTER
Please call patient and let him know that I recommend that he go to ER for further evaluation. I worry that he may have infection for the parotid gland given his location of pain and swelling/redness. Treatment of parotitisusually requires the need to have imaging done of that area to rule out abscess and IV antibiotics to treat as the infection can get into the deep spaces of his head and neck and oral antibiotics will not react quick enough. Yesterday his pain was limited to the ear and just outside the ear and no swelling or redness was noted by patient. Today his symptoms have progressed enough that worry me that he needs immediate attention. Thanks. I can call him after my next two visits if he does not understand.

## 2020-04-22 NOTE — TELEPHONE ENCOUNTER
Can you call patient. I did phone visit with him yesterday and he denied swelling. Is this new? Any other new symptoms?

## 2020-04-22 NOTE — PROGRESS NOTES
Subjective:   Gene German is a 76 y.o. male      Chief Complaint   Patient presents with    Jaw Swelling      right swollen jaw and ear, started yestarday. History of present illness: He presents today complaining of some swelling of the right side of his face as well as the jaw that started yesterday. He did call and talk to the nurse practitioner yesterday and said he had some swelling in his right ear which time prescription was called in for Ciprodex. He used that yesterday yet despite that the side of his face is now become red and swollen his jaw. He has noted no systemic fevers or chills. He notes no other complaints.     Patient Active Problem List   Diagnosis Code    MVP (mitral valve prolapse) I34.1    Prostatism N40.0    Basal cell carcinoma of nose C44.311    Annual physical exam Z00.00    Compression fracture of lumbar spine, non-traumatic, sequela M48.56XS    Age-related osteoporosis with current pathological fracture M80.00XA    Parkinson's disease (tremor, stiffness, slow motion, unstable posture) ([facility]) G20    Constipation K59.00    Bowel obstruction ([facility]) K56.609    Chronic fatigue R53.82    Weight loss R63.4    Left hip pain M25.552    Sciatica of left side M54.32    Osteopenia M85.80    Impacted cerumen of right ear H61.21    Acute diffuse otitis externa of right ear H60.311      Past Medical History:   Diagnosis Date    Mitral valve disorders(424.0) 11/19/2009    4/3/17 followed by PCP no cardiologist    [facility]     Prostatism 11/19/2009    Skin cancer       No Known Allergies   Family History   Problem Relation Age of Onset    Cancer Mother         colon    Parkinson's Disease Father       Social History     Socioeconomic History    Marital status:      Spouse name: Not on file    Number of children: Not on file    Years of education: Not on file    Highest education level: Not on file   Occupational History    Not on file Social Needs    Financial resource strain: Not on file    Food insecurity     Worry: Not on file     Inability: Not on file    Transportation needs     Medical: Not on file     Non-medical: Not on file   Tobacco Use    Smoking status: Former Smoker    Smokeless tobacco: Never Used   Substance and Sexual Activity    Alcohol use: Yes     Comment: Rarely     Drug use: No    Sexual activity: Not Currently   Lifestyle    Physical activity     Days per week: Not on file     Minutes per session: Not on file    Stress: Not on file   Relationships    Social connections     Talks on phone: Not on file     Gets together: Not on file     Attends Yazdanism service: Not on file     Active member of club or organization: Not on file     Attends meetings of clubs or organizations: Not on file     Relationship status: Not on file    Intimate partner violence     Fear of current or ex partner: Not on file     Emotionally abused: Not on file     Physically abused: Not on file     Forced sexual activity: Not on file   Other Topics Concern    Not on file   Social History Narrative    ** Merged History Encounter **          Prior to Admission medications    Medication Sig Start Date End Date Taking? Authorizing Provider   trimethoprim-sulfamethoxazole (BACTRIM DS, SEPTRA DS) 160-800 mg per tablet Take 1 Tab by mouth two (2) times a day. 4/22/20  Yes Samantha Fernandez MD   ciprofloxacin-dexamethasone (CIPRODEX) 0.3-0.1 % otic suspension Administer 4 Drops in right ear two (2) times a day for 7 days. 4/21/20 4/28/20 Yes Santino Sen NP   turmeric (CURCUMIN) by Does Not Apply route. Yes Provider, Historical   carbidopa-levodopa (SINEMET)  mg per tablet Pt taking 6 tabs daily 3/16/20  Yes Orlando Hernandez MD   ubidecarenone (COQ-10 PO) Take  by mouth. Yes Provider, Historical   carbidopa-levodopa ER (SINEMET CR)  mg per tablet Take 1 Tab by mouth daily.  1/3/20  Yes Sissy Sen NP   GLUTATHIONE 200 mg by Does Not Apply route three (3) times daily. Yes Provider, Historical   polyethylene glycol (MIRALAX) 17 gram packet Take 17 g by mouth daily as needed. Yes Provider, Historical   cholecalciferol, VITAMIN D3, (VITAMIN D3) 5,000 unit tab tablet Take 5,000 Units by mouth two (2) times a week. Yes Provider, Historical        Review of Systems              Constitutional:  He denies fever, weight loss, sweats or fatigue. EYES: No blurred or double vision,               ENT: no nasal congestion, no headache or dizziness. No difficulty with               swallowing, taste, speech or smell. Respiratory:  No cough, wheezing or shortness of breath. No sputum production. Cardiac:  Denies chest pain, palpitations, unexplained indigestion, syncope, edema, PND or orthopnea. GI:  No changes in bowel movements, no abdominal pain, no bloating, anorexia, nausea, vomiting or heartburn. :  No frequency or dysuria. Denies incontinence or sexual dysfunction. Extremities:  No joint pain, stiffness or swelling  Back:.no pain or soreness  Skin:  No recent rashes or mole changes. Right jaw and side of his face swelling and red  Neurological:  No numbness, tingling, burning paresthesias or loss of motor strength. No syncope, dizziness, frequent headaches or memory loss. Hematologic:  No easy bruising  Lymphatic: No lymph node enlargement    Objective:     Vitals:    04/22/20 1532   BP: 127/86   Pulse: 80   Temp: 98 °F (36.7 °C)   TempSrc: Oral   SpO2: 92%   Weight: 174 lb 4.8 oz (79.1 kg)   Height: 5' 11\" (1.803 m)   PainSc:   0 - No pain       Body mass index is 24.31 kg/m². Physical Examination:              General Appearance:  Well-developed, well-nourished, no acute distress. HEENT:      Ears:  The TMs and ear canals were clear. Right ear cerumen impaction.   After clearing evidence of mild otitis externa  Eyes:  The pupillary responses were normal.  Extraocular muscle function intact. Lids and conjunctiva not injected. Funduscopic exam revealed sharp disc margins. Nares: Clear w/o edema or erythema  Pharynx:  Clear with teeth in good repair. No masses were noted. Neck:  Supple without thyromegaly or adenopathy. No JVD noted. No carotid                bruits. Lungs:  Clear to auscultation and percussion. Cardiac:  Regular rate and rhythm without murmur. PMI not displaced. No gallop, rub or click. Abdominal: Soft, non-tender, no hepata-spleenomegally or masses  Extremities:  No clubbing, cyanosis or edema. Skin:  No rash or unusual mole changes noted. Marked erythroderma over the right cheek and jaw with soft tissue swelling. He does have an area of excoriation over his chin as well as just anterior to his right ear. Lymph Nodes:  None felt in the cervical, supraclavicular, axillary or inguinal region. Neurological: . DTRs 2+ and symmetric. Station and gait normal.   Hematologic:   No purpura or petechiae        Assessment/Plan:         1. Cellulitis of face    2. Impacted cerumen of right ear    3. Acute diffuse otitis externa of right ear        Impressions/Plan:  Impression  1. Cellulitis right side of his face we will treat this with Bactrim DS twice daily for 10 days  2. Cerumen impaction right ear Cerumenex instilled and irrigated then with alligator forceps cleared and appears to be normal.  3.  Otitis externa he will continue with Ciprodex  Recheck in 1 week or sooner should symptoms not resolved. Orders Placed This Encounter    REMOVE IMPACTED EAR WAX    trimethoprim-sulfamethoxazole (BACTRIM DS, SEPTRA DS) 160-800 mg per tablet       Follow-up and Dispositions    · Return in about 1 week (around 4/29/2020). No results found for any visits on 04/22/20. Jordan Hankins MD    The patient was given after the visit summary the patient verbalized an understanding of the plans and problems as explained.

## 2020-04-22 NOTE — TELEPHONE ENCOUNTER
Rojas Elliott spoke to pt and he stated he was going to ER. I have tried calling pt several times and keep getting vm.

## 2020-04-28 ENCOUNTER — OFFICE VISIT (OUTPATIENT)
Dept: INTERNAL MEDICINE CLINIC | Age: 75
End: 2020-04-28

## 2020-04-28 VITALS
DIASTOLIC BLOOD PRESSURE: 76 MMHG | RESPIRATION RATE: 19 BRPM | HEIGHT: 71 IN | HEART RATE: 68 BPM | SYSTOLIC BLOOD PRESSURE: 114 MMHG | BODY MASS INDEX: 23.95 KG/M2 | TEMPERATURE: 98.2 F | WEIGHT: 171.1 LBS | OXYGEN SATURATION: 95 %

## 2020-04-28 DIAGNOSIS — B02.9 HERPES ZOSTER WITHOUT COMPLICATION: Primary | ICD-10-CM

## 2020-04-28 RX ORDER — FAMCICLOVIR 500 MG/1
500 TABLET ORAL 3 TIMES DAILY
Qty: 21 TAB | Refills: 0 | Status: SHIPPED | OUTPATIENT
Start: 2020-04-28 | End: 2020-05-05

## 2020-04-28 NOTE — PROGRESS NOTES
Chief Complaint   Patient presents with    Skin Infection     1 week follow up     Visit Vitals  /76 (BP 1 Location: Left arm, BP Patient Position: Sitting)   Pulse 68   Temp 98.2 °F (36.8 °C) (Oral)   Resp 19   Ht 5' 11\" (1.803 m)   Wt 171 lb 1.6 oz (77.6 kg)   SpO2 95%   BMI 23.86 kg/m²     1. Have you been to the ER, urgent care clinic since your last visit? Hospitalized since your last visit? No    2. Have you seen or consulted any other health care providers outside of the [address] [place] [address] since your last visit? Include any pap smears or colon screening.  No

## 2020-04-28 NOTE — PROGRESS NOTES
Chief Complaint   Patient presents with    Skin Infection     1 week follow up       SUBJECTIVE:    Trung Davison is a 76 y.o. male who was seen by me last week for right otitis externa with right cerumen impaction as well as some facial erythema on the right side felt to be consistent with cellulitis however at that point he had no vesiculation no blisters noted. He subsequently couple days later developed some blisters on his right side which he thought were related to the Bactrim that he been given for cellulitis. He returns today noting that he still has some vesicles/blisters on his right side of his face but no other area of his body is involved. He notes no significant pain in that area. Current Outpatient Medications   Medication Sig Dispense Refill    famciclovir (FAMVIR) 500 mg tablet Take 1 Tab by mouth three (3) times daily for 7 days. 21 Tab 0    ciprofloxacin-dexamethasone (CIPRODEX) 0.3-0.1 % otic suspension Administer 4 Drops in right ear two (2) times a day for 7 days. 7.5 mL 0    turmeric (CURCUMIN) by Does Not Apply route.  ubidecarenone (COQ-10 PO) Take  by mouth.  carbidopa-levodopa ER (SINEMET CR)  mg per tablet Take 1 Tab by mouth daily. 90 Tab 1    GLUTATHIONE 200 mg by Does Not Apply route three (3) times daily.  polyethylene glycol (MIRALAX) 17 gram packet Take 17 g by mouth daily as needed.  cholecalciferol, VITAMIN D3, (VITAMIN D3) 5,000 unit tab tablet Take 5,000 Units by mouth two (2) times a week.        Past Medical History:   Diagnosis Date    Mitral valve disorders(424.0) 11/19/2009    4/3/17 followed by PCP no cardiologist    [facility]     Prostatism 11/19/2009    Skin cancer      Past Surgical History:   Procedure Laterality Date    COLONOSCOPY N/A 2/7/2018    COLONOSCOPY performed by Sujit Hargrove MD at [address] HX APPENDECTOMY  childhood    HX COLONOSCOPY       No Known Allergies    REVIEW OF SYSTEMS:  General: negative for - chills or fever, or weight loss or gain  ENT: negative for - headaches, nasal congestion or tinnitus  Eyes: no blurred or visual changes  Neck: No stiffness or swollen nodes  Respiratory: negative for - cough, hemoptysis, shortness of breath or wheezing  Cardiovascular : negative for - chest pain, edema, palpitations or shortness of breath  Gastrointestinal: negative for - abdominal pain, blood in stools, heartburn or nausea/vomiting  Genito-Urinary: no dysuria, trouble voiding, or hematuria  Musculoskeletal: negative for - gait disturbance, joint pain, joint stiffness or joint swelling  Neurological: no TIA or stroke symptoms  Hematologic: no bruises, no bleeding  Lymphatic: no swollen glands  Integument: no lumps, mole changes, nail changes or rash except blisters right side of his face involving the lower cheek and mandible area with no involvement in the ocular area  Endocrine:no malaise/lethargy poly uria or polydipsia or unexpected weight changes        Social History     Socioeconomic History    Marital status:      Spouse name: Not on file    Number of children: Not on file    Years of education: Not on file    Highest education level: Not on file   Tobacco Use    Smoking status: Former Smoker    Smokeless tobacco: Never Used   Substance and Sexual Activity    Alcohol use: Yes     Comment: Rarely     Drug use: No    Sexual activity: Not Currently   Social History Narrative    ** Merged History Encounter **          Family History   Problem Relation Age of Onset    Cancer Mother         colon    Parkinson's Disease Father        OBJECTIVE:     Visit Vitals  /76 (BP 1 Location: Left arm, BP Patient Position: Sitting)   Pulse 68   Temp 98.2 °F (36.8 °C) (Oral)   Resp 19   Ht 5' 11\" (1.803 m)   Wt 171 lb 1.6 oz (77.6 kg)   SpO2 95%   BMI 23.86 kg/m²     CONSTITUTIONAL:   well nourished, appears age appropriate  EYES: sclera anicteric, PERRL, EOMI  ENMT:nares clear, moist mucous membranes, pharynx clear  NECK: supple. Thyroid normal, No JVD or bruits  RESPIRATORY: Chest: clear to ascultation and percussion, normal inspiratory effort  CARDIOVASCULAR: Heart: regular rate and rhythm no murmurs, rubs or gallops, PMI not displaced, No thrills, no peripheral edema  GASTROINTESTINAL: Abdomen: non distended, soft, non tender, bowel sounds normal  HEMATOLOGIC: no purpura, petechiae or bruising  LYMPHATIC: No lymph node enlargemant  MUSCULOSKELETAL: Extremities: no active synovitis, pulse 1+   INTEGUMENT: No unusual rashes or suspicious skin lesions noted. Nails appear normal.  Vesicular rash with some crusting right lower cheek and mandible area with no ocular involvement consistent with shingles  PERIPHERAL VASCULAR: normal pulses femoral, PT and DP  NEUROLOGIC: non-focal exam, A & O X 3  PSYCHIATRIC:, appropriate affect     ASSESSMENT:   1. Herpes zoster without complication      Impression  1. Shingles right side of his face without ocular involvement. He has at the auto limits of whether Famvir would be beneficial at this point but I am going to go ahead and give him a 7-day course of Famvir. He is to stop the Bactrim but I do not think that has any involvement in the rash. Recheck with his regular PCP as scheduled. PLAN:  .  Orders Placed This Encounter    famciclovir (FAMVIR) 500 mg tablet         ATTENTION:   This medical record was transcribed using an electronic medical records system. Although proofread, it may and can contain electronic and spelling errors. Other human spelling and other errors may be present. Corrections may be executed at a later time. Please feel free to contact us for any clarifications as needed. No results found for any visits on 04/28/20. Jeanine Ulrich MD    The patient verbalized understanding of the problems and plans as explained.

## 2020-04-28 NOTE — PATIENT INSTRUCTIONS

## 2020-05-04 ENCOUNTER — VIRTUAL VISIT (OUTPATIENT)
Dept: NEUROLOGY | Age: 75
End: 2020-05-04

## 2020-05-04 ENCOUNTER — DOCUMENTATION ONLY (OUTPATIENT)
Dept: NEUROLOGY | Age: 75
End: 2020-05-04

## 2020-05-04 NOTE — PROGRESS NOTES
I reviewed 's note from 9/23/2018.  9/23/2019. The patient has been having worsening issues with walking. He admits to not be taking any Parkinson's disease medications daily. Patient was supposed to be on amantadine 100 mg p.o. 3 times daily, Cogentin 2 mg p.o. 3 times daily, Artane 4 mg 3 times a day and Sinemet 25/100 mg 3 times a day. The patient was seen in the Parkinson's clinic at [facility] in March 2019. The patient is supposed to take Sinemet 5-6 times a day according to that note. Examination shows masked facies. The patient has bradykinesia. The plan is to continue Sinemet 25/100 mg up to 5 times per day. We will continue with Sinemet CR 25/100 mg in the morning. No oral pocket is kinesia was seen.

## 2020-05-04 NOTE — PATIENT INSTRUCTIONS

## 2020-05-06 ENCOUNTER — TELEPHONE (OUTPATIENT)
Dept: FAMILY MEDICINE CLINIC | Age: 75
End: 2020-05-06

## 2020-05-06 DIAGNOSIS — R79.89 ELEVATED HOMOCYSTEINE: ICD-10-CM

## 2020-05-06 DIAGNOSIS — G20 PARKINSON DISEASE (HCC): ICD-10-CM

## 2020-05-06 DIAGNOSIS — E53.8 B12 DEFICIENCY: ICD-10-CM

## 2020-05-06 DIAGNOSIS — G20 PARKINSON DISEASE (HCC): Primary | ICD-10-CM

## 2020-05-06 NOTE — TELEPHONE ENCOUNTER
Message from Betsy Sen/Telephone   Received: Today   Message Contents   [facility] [place]   Phone Number: [phone]             Pt returned the call to office regarding lab appt.

## 2020-05-06 NOTE — TELEPHONE ENCOUNTER
Called pt ot notify him that he would have to go to [address] [place] [address]. He stated that someone had already called him.

## 2020-05-06 NOTE — TELEPHONE ENCOUNTER
Spoke with pt and verified name and . [facility].

## 2020-05-12 ENCOUNTER — HOSPITAL ENCOUNTER (OUTPATIENT)
Dept: LAB | Age: 75
Discharge: HOME | End: 2020-05-12
Payer: MEDICARE

## 2020-05-12 PROCEDURE — 83785 ASSAY OF MANGANESE: CPT

## 2020-05-12 PROCEDURE — 36415 COLL VENOUS BLD VENIPUNCTURE: CPT

## 2020-05-12 PROCEDURE — 85025 COMPLETE CBC W/AUTO DIFF WBC: CPT

## 2020-05-13 LAB
BASOPHILS # BLD AUTO: 0 X10E3/UL (ref 0–0.2)
BASOPHILS NFR BLD AUTO: 0 %
CREAT UR-MCNC: 0.3 G/L (ref 0.3–3)
EOSINOPHIL # BLD AUTO: 0.1 X10E3/UL (ref 0–0.4)
EOSINOPHIL NFR BLD AUTO: 2 %
ERYTHROCYTE [DISTWIDTH] IN BLOOD BY AUTOMATED COUNT: 13.1 % (ref 11.6–15.4)
HCT VFR BLD AUTO: 43.6 % (ref 37.5–51)
HGB BLD-MCNC: 14.6 G/DL (ref 13–17.7)
IMM GRANULOCYTES # BLD AUTO: 0 X10E3/UL (ref 0–0.1)
IMM GRANULOCYTES NFR BLD AUTO: 0 %
LYMPHOCYTES # BLD AUTO: 2 X10E3/UL (ref 0.7–3.1)
LYMPHOCYTES NFR BLD AUTO: 26 %
MANGANESE BLD-MCNC: 14.9 UG/L (ref 8–18.7)
MANGANESE SERPL-MCNC: NORMAL UG/L
MANGANESE [MASS/VOLUME] IN URINE: 1.9 UG/L (ref 0–2.9)
MANGANESE/CREATININE [MASS RATIO] IN URINE: 6.3 UG/G CREAT
MCH RBC QN AUTO: 32 PG (ref 26.6–33)
MCHC RBC AUTO-ENTMCNC: 33.5 G/DL (ref 31.5–35.7)
MCV RBC AUTO: 96 FL (ref 79–97)
MONOCYTES # BLD AUTO: 0.6 X10E3/UL (ref 0.1–0.9)
MONOCYTES NFR BLD AUTO: 8 %
NEUTROPHILS # BLD AUTO: 4.9 X10E3/UL (ref 1.4–7)
NEUTROPHILS NFR BLD AUTO: 64 %
PLATELET # BLD AUTO: 198 X10E3/UL (ref 150–450)
RBC # BLD AUTO: 4.56 X10E6/UL (ref 4.14–5.8)
WBC # BLD AUTO: 7.6 X10E3/UL (ref 3.4–10.8)

## 2020-05-14 NOTE — PROGRESS NOTES
Hi Mr Khalif Huitron somehow did the wrong labs on you. They repeated the labs you had done last month instead of the new labs that I ordered (b12 and homocysteine levels). We called [facility] to inquire about this, they said they cannot add on to the labs that they teetee and would need to redraw blood from you in order to get these levels. I am so sorry this happened, I have no idea why they repeated old orders instead of new orders. Please let me know when you would like to go back to lab and I will have my nurse resend these orders.     [facility] NP

## 2020-05-14 NOTE — PROGRESS NOTES
Why did the lab repeat old labs? This is not what was ordered. He was suppose to have homocysteine and b12 repeated. Can you call and check on this?

## 2020-05-14 NOTE — PROGRESS NOTES
Appears wrong labs were sent out. The labs that are needed, need to have plasma so they cannot add them on. Pt will need to be re-drawn.

## 2020-05-18 ENCOUNTER — HOSPITAL ENCOUNTER (OUTPATIENT)
Dept: LAB | Age: 75
Discharge: HOME | End: 2020-05-18
Payer: MEDICARE

## 2020-05-18 PROCEDURE — 36415 COLL VENOUS BLD VENIPUNCTURE: CPT

## 2020-05-18 PROCEDURE — 83090 ASSAY OF HOMOCYSTEINE: CPT

## 2020-05-18 PROCEDURE — 82607 VITAMIN B-12: CPT

## 2020-05-18 NOTE — PROGRESS NOTES
I sent patient mychart message, see below. Can someone call and see if he got this message?   Thanks

## 2020-05-18 NOTE — PROGRESS NOTES
Called pt, verified name and . Informed pt that per Sissy wrong labs were drawn and he needs to go back to [facility] for repeat labwork. Pt stated understanding and will go this afternoon. Faxed labs to [facility], confirmation received.

## 2020-05-19 LAB
HCYS SERPL-SCNC: 16.2 UMOL/L (ref 0–19.2)
VIT B12 SERPL-MCNC: 526 PG/ML (ref 232–1245)

## 2020-05-27 ENCOUNTER — TELEPHONE (OUTPATIENT)
Dept: FAMILY MEDICINE CLINIC | Age: 75
End: 2020-05-27

## 2020-05-27 NOTE — TELEPHONE ENCOUNTER
Called pt and verified name and . Pt voiced that he will come by and  last labs. No further questions at this time.

## 2020-05-28 NOTE — PROGRESS NOTES
Sent to [employer]  Hi Mr Marianela Ibrahim  Here are your labs as requested.   Vitamin B12 much better

## 2020-06-09 ENCOUNTER — OFFICE VISIT (OUTPATIENT)
Dept: INTERNAL MEDICINE CLINIC | Age: 75
End: 2020-06-09

## 2020-06-09 VITALS
HEIGHT: 71 IN | WEIGHT: 177 LBS | OXYGEN SATURATION: 96 % | HEART RATE: 66 BPM | BODY MASS INDEX: 24.78 KG/M2 | TEMPERATURE: 97.7 F | RESPIRATION RATE: 16 BRPM | SYSTOLIC BLOOD PRESSURE: 108 MMHG | DIASTOLIC BLOOD PRESSURE: 78 MMHG

## 2020-06-09 DIAGNOSIS — H61.21 IMPACTED CERUMEN OF RIGHT EAR: Primary | ICD-10-CM

## 2020-06-09 DIAGNOSIS — G20 PARKINSON DISEASE (HCC): ICD-10-CM

## 2020-06-09 DIAGNOSIS — I34.1 MVP (MITRAL VALVE PROLAPSE): ICD-10-CM

## 2020-06-09 DIAGNOSIS — G20 PARKINSON'S DISEASE (TREMOR, STIFFNESS, SLOW MOTION, UNSTABLE POSTURE) (HCC): ICD-10-CM

## 2020-06-09 DIAGNOSIS — B02.9 HERPES ZOSTER WITHOUT COMPLICATION: ICD-10-CM

## 2020-06-09 LAB
A-G RATIO,AGRAT: 1.4 RATIO
ALBUMIN SERPL-MCNC: 4.2 G/DL (ref 3.9–5.4)
ALP SERPL-CCNC: 88 U/L (ref 38–126)
ALT SERPL-CCNC: 5 U/L (ref 0–50)
ANION GAP SERPL CALC-SCNC: 7 MMOL/L
AST SERPL W P-5'-P-CCNC: 29 U/L (ref 14–36)
BILIRUB SERPL-MCNC: 0.7 MG/DL (ref 0.2–1.3)
BUN SERPL-MCNC: 19 MG/DL (ref 9–20)
BUN/CREATININE RATIO,BUCR: 19 RATIO
CALCIUM SERPL-MCNC: 9.4 MG/DL (ref 8.4–10.2)
CHLORIDE SERPL-SCNC: 103 MMOL/L (ref 98–107)
CO2 SERPL-SCNC: 29 MMOL/L (ref 22–32)
CREAT SERPL-MCNC: 1 MG/DL (ref 0.8–1.5)
ERYTHROCYTE [DISTWIDTH] IN BLOOD BY AUTOMATED COUNT: 13.7 %
GLOBULIN,GLOB: 3
GLUCOSE SERPL-MCNC: 82 MG/DL (ref 75–110)
HCT VFR BLD AUTO: 42.6 % (ref 37–51)
HGB BLD-MCNC: 14.1 G/DL (ref 12–18)
MCH RBC QN AUTO: 32.5 PG (ref 26–32)
MCHC RBC AUTO-ENTMCNC: 33.1 G/DL (ref 30–36)
MCV RBC AUTO: 98.2 FL (ref 80–97)
PLATELET # BLD AUTO: 177 K/UL (ref 140–440)
POTASSIUM SERPL-SCNC: 4.5 MMOL/L (ref 3.6–5)
PROT SERPL-MCNC: 7.2 G/DL (ref 6.3–8.2)
RBC # BLD AUTO: 4.34 M/UL (ref 4.2–6.3)
SODIUM SERPL-SCNC: 139 MMOL/L (ref 137–145)
WBC # BLD AUTO: 7.1 K/UL (ref 4.1–10.9)

## 2020-06-09 RX ORDER — LANOLIN ALCOHOL/MO/W.PET/CERES
500 CREAM (GRAM) TOPICAL DAILY
COMMUNITY

## 2020-06-09 RX ORDER — CARBIDOPA AND LEVODOPA 25; 100 MG/1; MG/1
1 TABLET, EXTENDED RELEASE ORAL DAILY
Qty: 90 TAB | Refills: 1 | Status: SHIPPED | OUTPATIENT
Start: 2020-06-09 | End: 2020-06-15 | Stop reason: SDUPTHER

## 2020-06-09 NOTE — PROGRESS NOTES
This note will not be viewable in [address] Alex Rock is a 76 y.o. male and presents with Tremors (6 mo fu)      Subjective:  Patient comes in today for follow-up of his chronic medical problems. He was recently diagnosed with herpes zoster without ocular involvement and right-sided facial cellulitis for which he was treated with famciclovir in April 2020. Still has some intermittent pain on the right side cheek. The vesicles have healed well. He denies any problems of jaw claudication or eye pain during mastication. He still has some right-sided diminished hearing loss. He has a history of parkinsonism and now switching care to Dr. Deleon/neurology. He is on Sinemet. He still has some resting tremors in the hands more on the right as compared to the left. Denies any falls but does have bradykinesia and masked facies. He has a follow-up appointment with his neurologist in August 2020. Vitamin B12 deficiency on supplementation. Recap-Patient follows up with his neurologist Dr. Saima Gresham for his Parkinson's. He was initially on amantadine but that was discontinued as he was in helping him anyways. He reports taking Sinemet as prescribed. Reports some tremors in his right hands which are not worse in the past.  He tried Medrol Dosepak for his left-sided sciatica and his symptoms have been stable. Mitral valve prolapse on echocardiography, asymptomatic. Denies chest pain, PND, orthopnea, leg edema.   Has a history of recurrent skin cancers in the past.      Past Medical History:   Diagnosis Date    Mitral valve disorders(424.0) 11/19/2009    4/3/17 followed by PCP no cardiologist    Parkinson disease ([facility] [address]     Prostatism 11/19/2009    Skin cancer      Past Surgical History:   Procedure Laterality Date    COLONOSCOPY N/A 2/7/2018    COLONOSCOPY performed by Shanel Llamas MD at [address] HX APPENDECTOMY  childhood    HX COLONOSCOPY       No Known Allergies  Current Outpatient Medications   Medication Sig Dispense Refill    cyanocobalamin (VITAMIN B12) 500 mcg tablet Take 500 mcg by mouth daily.  carbidopa-levodopa ER (Sinemet CR)  mg per tablet Take 1 Tab by mouth daily. 90 Tab 1    turmeric (CURCUMIN) by Does Not Apply route.  ubidecarenone (COQ-10 PO) Take  by mouth.  GLUTATHIONE 200 mg by Does Not Apply route three (3) times daily.  cholecalciferol, VITAMIN D3, (VITAMIN D3) 5,000 unit tab tablet Take 5,000 Units by mouth two (2) times a week. Social History     Socioeconomic History    Marital status:      Spouse name: Not on file    Number of children: Not on file    Years of education: Not on file    Highest education level: Not on file   Tobacco Use    Smoking status: Former Smoker    Smokeless tobacco: Never Used   Substance and Sexual Activity    Alcohol use: Yes     Comment: Rarely     Drug use: No    Sexual activity: Not Currently   Social History Narrative    ** Merged History Encounter **          Family History   Problem Relation Age of Onset    Cancer Mother         colon    Parkinson's Disease Father        Review of Systems  ROS is unremarkable except for ones highlighted in bold.    Constitutional: negative for fevers, chills, anorexia and weight loss  Eyes:   negative for visual disturbance and irritation  ENT:   negative for tinnitus,sore throat,nasal congestion,ear pain,hoarseness  Respiratory:  negative for cough, hemoptysis, dyspnea,wheezing  CV:   negative for chest pain, palpitations, lower extremity edema  GI:   negative for nausea, vomiting, diarrhea, abdominal pain,melena  Endo:               negative for polyuria,polydipsia,polyphagia,heat intolerance  Genitourinary: negative for frequency, dysuria and hematuria  Integumentary: negative for rash and pruritus  Hematologic:  negative for easy bruising and gum/nose bleeding  Musculoskel: negative for myalgias, arthralgias, back pain, muscle weakness, joint pain  Neurological:  negative for headaches, dizziness, vertigo, memory problems and gait, tremors  Behavl/Psych: negative for feelings of anxiety, depression, mood changes  ROS otherwise negative     Objective:  Visit Vitals  /78 (BP 1 Location: Left arm, BP Patient Position: Sitting)   Pulse 66   Temp 97.7 °F (36.5 °C) (Oral)   Resp 16   Ht 5' 11\" (1.803 m)   Wt 177 lb (80.3 kg)   SpO2 96%   BMI 24.69 kg/m²     Physical Exam:   General appearance - alert, well appearing, and in no distress. masked facies  Mental status - alert, oriented to person, place, and time  EYE-REYES, EOMI, fundi normal, corneas normal, no foreign bodies  ENT-ENT exam normal, no neck nodes or sinus tenderness  Nose - normal and patent, no erythema, discharge or polyps  Mouth - mucous membranes moist, pharynx normal without lesions  Neck - supple, no significant adenopathy   Chest - clear to auscultation, no wheezes, rales or rhonchi, symmetric air entry   Heart - normal rate, regular rhythm, normal S1, S2, no murmurs, rubs, clicks or gallops   Abdomen - soft, nontender, nondistended, no masses or organomegaly  Lymph- no adenopathy palpable  Ext-peripheral pulses normal, no pedal edema, no clubbing or cyanosis  Skin-Warm and dry.  no hyperpigmentation, vitiligo, or suspicious lesions  Neuro -alert, oriented, normal speech, no focal findings or movement disorder noted, right hand tremors noticed at rest,bradykinesia  Musculoskeletal- FROM, no bony abnormalities, no point tenderness    Lab Review:  Results for orders placed or performed in visit on 06/09/20   CBC W/O DIFF   Result Value Ref Range    WBC 7.1 4.1 - 10.9 K/uL    RBC 4.34 4.20 - 6.30 M/uL    HGB 14.1 12.0 - 18.0 g/dL    HCT 42.6 37.0 - 51.0 %    MCH 32.5 (H) 26.0 - 32.0 pg    MCHC 33.1 30.0 - 36.0 g/dL    MCV 98.2 (H) 80.0 - 97.0 fL    RDW 13.7 %    PLATELET 551.7 819.2 - 440.0 K/uL        Documenation Review:  Reviewed records of Dr. Lizbeth Escobedo.  Patient follows up with him for his Parkinson's. Patient continues to take Sinemet. He had stopped taking amantadine as that was not helping his tremors. He continues physical therapy and yoga which helps. Imaging review:  Colonoscopy 2/7/2018 by Dr. Kathryn Mendoza. Patient has a family history of colon cancer, underwent colonoscopy which was unremarkable. Next colonoscopy due in 5 years. 2023. DEXA scan 2018. 10-year probability of major osteoporotic fracture 9.6%, 10-year probability of hip fracture 1.5%. Patient was osteopenic. Assessment/Plan:    Diagnoses and all orders for this visit:    1. Impacted cerumen of right ear    2. Parkinson disease ([address]  -     carbidopa-levodopa ER (Sinemet CR)  mg per tablet; Take 1 Tab by mouth daily. 3. Parkinson's disease (tremor, stiffness, slow motion, unstable posture) (HCC)  -     CBC W/O DIFF  -     METABOLIC PANEL, COMPREHENSIVE    4. MVP (mitral valve prolapse)    5. Herpes zoster without complication        Refer for ENT . Follow-up with neurologist.  Continue current meds. I will call with lab results and make further recommendations or adjustments if necessary. Encouraged weight-bearing exercises, calcium and vitamin D as recommended. ICD-10-CM ICD-9-CM    1. Impacted cerumen of right ear H61.21 380.4    2. Parkinson disease (HCC) G20 332.0 carbidopa-levodopa ER (Sinemet CR)  mg per tablet   3. Parkinson's disease (tremor, stiffness, slow motion, unstable posture) (HCC) G20 332.0 CBC W/O DIFF      METABOLIC PANEL, COMPREHENSIVE   4. MVP (mitral valve prolapse) I34.1 424.0    5. Herpes zoster without complication L61.1 311.2          Follow-up and Dispositions    · Return in about 6 months (around 12/9/2020). I have reviewed with the patient details of the assessment and plan and all questions were answered. Relevent patient education was performed. Verbal and/or written instructions (see AVS) provided.  The most recent lab findings were reviewed with the patient. Plan was discussed with patient who verbally expressed understanding. An After Visit Summary was printed and given to the patient.     Georgia Correia MD

## 2020-06-09 NOTE — PROGRESS NOTES
Mary Pham is a 76 y.o. male presenting for Tremors (6 mo fu)  . 1. Have you been to the ER, urgent care clinic since your last visit? Hospitalized since your last visit? No    2. Have you seen or consulted any other health care providers outside of the [address] [place] [address] since your last visit? Include any pap smears or colon screening. No    Fall Risk Assessment, last 12 mths 6/9/2020   Able to walk? Yes   Fall in past 12 months? No   Fall with injury? -   Number of falls in past 12 months -   Fall Risk Score -         Abuse Screening Questionnaire 6/9/2020   Do you ever feel afraid of your partner? N   Are you in a relationship with someone who physically or mentally threatens you? N   Is it safe for you to go home? Y       3 most recent PHQ Screens 6/9/2020   Little interest or pleasure in doing things Not at all   Feeling down, depressed, irritable, or hopeless Not at all   Total Score PHQ 2 0       There are no discontinued medications.

## 2020-06-15 DIAGNOSIS — G20 PARKINSON DISEASE (HCC): ICD-10-CM

## 2020-06-15 NOTE — TELEPHONE ENCOUNTER
Requested Prescriptions     Pending Prescriptions Disp Refills    carbidopa-levodopa ER (Sinemet CR)  mg per tablet 90 Tab 1     Sig: Take 1 Tab by mouth daily.        *Patient states he needs the regular Carbidope snd Levadopa

## 2020-06-16 RX ORDER — CARBIDOPA AND LEVODOPA 25; 100 MG/1; MG/1
1 TABLET, EXTENDED RELEASE ORAL DAILY
Qty: 90 TAB | Refills: 1 | Status: SHIPPED | OUTPATIENT
Start: 2020-06-16 | End: 2020-06-23 | Stop reason: DRUGHIGH

## 2020-06-23 ENCOUNTER — TELEPHONE (OUTPATIENT)
Dept: INTERNAL MEDICINE CLINIC | Age: 75
End: 2020-06-23

## 2020-06-23 DIAGNOSIS — G20 PARKINSON DISEASE (HCC): ICD-10-CM

## 2020-06-23 DIAGNOSIS — G20 PARKINSON'S DISEASE (TREMOR, STIFFNESS, SLOW MOTION, UNSTABLE POSTURE) (HCC): Primary | ICD-10-CM

## 2020-06-23 RX ORDER — CARBIDOPA AND LEVODOPA 25; 100 MG/1; MG/1
1 TABLET ORAL
Qty: 450 TAB | Refills: 0 | Status: SHIPPED | OUTPATIENT
Start: 2020-06-23 | End: 2020-09-21

## 2020-06-23 NOTE — TELEPHONE ENCOUNTER
Patient states he has an appointment with the neurologist in august. He would like if you could fill a Rx for plain Sinemet and he states he does take 5 tabs a day.

## 2020-06-23 NOTE — TELEPHONE ENCOUNTER
Baltazar Hayes MD  You 2 hours ago (11:45 AM)      Please call patient and let him know that his neurologist had recommended to continue Sinemet CR.     Also reviewed records of his neurologist/Dr. Regi Rosen.  And reviewed records from [facility]. Ayan Freeman had recommended him taking Sinemet up to 5 times in a day. Ariana Thompson, I will defer to his neurologist.     If still he wants the plain Sinemet and let me know and I will try and send in a prescription.  I recommend him calling his neurologist office and seeing what the recommendations are

## 2020-06-23 NOTE — TELEPHONE ENCOUNTER
Patient called asking if he can get Sinemet CR 25-100mg WITHOUT extended release sent to his pharmacy.    CB: [phone]

## 2020-06-24 NOTE — TELEPHONE ENCOUNTER
Ghada Abdi MD  You 16 hours ago (3:20 PM)      Acknowledged. Cochran Ion prescription sent to [facility] pharmacy.  Sinemet 5 times in a day.

## 2020-09-01 ENCOUNTER — OFFICE VISIT (OUTPATIENT)
Dept: INTERNAL MEDICINE CLINIC | Age: 75
End: 2020-09-01
Payer: MEDICARE

## 2020-09-01 VITALS
DIASTOLIC BLOOD PRESSURE: 76 MMHG | RESPIRATION RATE: 14 BRPM | HEART RATE: 51 BPM | TEMPERATURE: 97.6 F | OXYGEN SATURATION: 97 % | HEIGHT: 71 IN | SYSTOLIC BLOOD PRESSURE: 108 MMHG | BODY MASS INDEX: 24.78 KG/M2 | WEIGHT: 177 LBS

## 2020-09-01 DIAGNOSIS — I44.0 FIRST DEGREE HEART BLOCK BY ELECTROCARDIOGRAM: ICD-10-CM

## 2020-09-01 DIAGNOSIS — G20 PARKINSON DISEASE (HCC): ICD-10-CM

## 2020-09-01 DIAGNOSIS — M25.551 RIGHT HIP PAIN: Primary | ICD-10-CM

## 2020-09-01 PROCEDURE — G8432 DEP SCR NOT DOC, RNG: HCPCS | Performed by: INTERNAL MEDICINE

## 2020-09-01 PROCEDURE — G8536 NO DOC ELDER MAL SCRN: HCPCS | Performed by: INTERNAL MEDICINE

## 2020-09-01 PROCEDURE — 3017F COLORECTAL CA SCREEN DOC REV: CPT | Performed by: INTERNAL MEDICINE

## 2020-09-01 PROCEDURE — G8427 DOCREV CUR MEDS BY ELIG CLIN: HCPCS | Performed by: INTERNAL MEDICINE

## 2020-09-01 PROCEDURE — G8420 CALC BMI NORM PARAMETERS: HCPCS | Performed by: INTERNAL MEDICINE

## 2020-09-01 PROCEDURE — 1101F PT FALLS ASSESS-DOCD LE1/YR: CPT | Performed by: INTERNAL MEDICINE

## 2020-09-01 PROCEDURE — 99214 OFFICE O/P EST MOD 30 MIN: CPT | Performed by: INTERNAL MEDICINE

## 2020-09-01 NOTE — PROGRESS NOTES
This note will not be viewable in [address] Diogo Weiner is a 76 y.o. male and presents with Abnormal Lab Results (EKG)      Subjective:  Patient comes in today for acute care visit for an abnormal [place] which [place] [facility] Personally reviewed EKG which shows normal sinus rhythm with first-degree heart block. Patient specifically denies chest pain, shortness of breath, dyspnea on exertion, PND, ankle edema. He does not have a history of mitral valve prolapse and had an echo a few years back which was unremarkable per patient. Reports right hip pain started 2 weeks back which is been getting progressively worse. Denies any limping on the foot however is having pain on the right side which is traveling all the way down to his right foot. Denies numbness or tingling sensation. Reports it is associated with stiffness which is more in the morning. He is not tried anything for his symptoms. He denies any falls or injuries. He has a history of parkinsonism and now following at [facility] neurology under some study? .  He is on Sinemet. He still has some resting tremors in the hands more on the right as compared to the left. Denies any falls but does have bradykinesia and masked facies. He has a follow-up appointment with his neurologist in August 2020. Vitamin B12 deficiency on supplementation. Recap-Patient follows up with his neurologist Dr. Cara Skiff for his Parkinson's. He was initially on amantadine but that was discontinued as he was in helping him anyways. He reports taking Sinemet as prescribed. Reports some tremors in his right hands which are not worse in the past.  He tried Medrol Dosepak for his left-sided sciatica and his symptoms have been stable. Mitral valve prolapse on echocardiography, asymptomatic. Denies chest pain, PND, orthopnea, leg edema.   Has a history of recurrent skin cancers in the past.      Past Medical History:   Diagnosis Date    Mitral valve disorders(424.0) 11/19/2009    4/3/17 followed by PCP no cardiologist    Parkinson disease ([facility] [address]     Prostatism 11/19/2009    Skin cancer      Past Surgical History:   Procedure Laterality Date    COLONOSCOPY N/A 2/7/2018    COLONOSCOPY performed by Kelby Atkins MD at [facility] AM[facility] OR    HX APPENDECTOMY  childhood    HX COLONOSCOPY       No Known Allergies  Current Outpatient Medications   Medication Sig Dispense Refill    carbidopa-levodopa (Sinemet)  mg per tablet Take 1 Tab by mouth five (5) times daily for 90 days. Indications: a type of movement disorder called parkinsonism 450 Tab 0    cyanocobalamin (VITAMIN B12) 500 mcg tablet Take 500 mcg by mouth daily.  turmeric (CURCUMIN) by Does Not Apply route.  ubidecarenone (COQ-10 PO) Take  by mouth.  GLUTATHIONE 200 mg by Does Not Apply route three (3) times daily.  cholecalciferol, VITAMIN D3, (VITAMIN D3) 5,000 unit tab tablet Take 5,000 Units by mouth two (2) times a week. Social History     Socioeconomic History    Marital status:      Spouse name: Not on file    Number of children: Not on file    Years of education: Not on file    Highest education level: Not on file   Tobacco Use    Smoking status: Former Smoker    Smokeless tobacco: Never Used   Substance and Sexual Activity    Alcohol use: Yes     Comment: Rarely     Drug use: No    Sexual activity: Not Currently   Social History Narrative    ** Merged History Encounter **          Family History   Problem Relation Age of Onset    Cancer Mother         colon    Parkinson's Disease Father        Review of Systems  ROS is unremarkable except for ones highlighted in bold.    Constitutional: negative for fevers, chills, anorexia and weight loss  Eyes:   negative for visual disturbance and irritation  ENT:   negative for tinnitus,sore throat,nasal congestion,ear pain,hoarseness  Respiratory:  negative for cough, hemoptysis, dyspnea,wheezing  CV:   negative for chest pain, palpitations, lower extremity edema  GI:   negative for nausea, vomiting, diarrhea, abdominal pain,melena  Endo:               negative for polyuria,polydipsia,polyphagia,heat intolerance  Genitourinary: negative for frequency, dysuria and hematuria  Integumentary: negative for rash and pruritus  Hematologic:  negative for easy bruising and gum/nose bleeding  Musculoskel: negative for myalgias, arthralgias, back pain, muscle weakness, joint pain  Neurological:  negative for headaches, dizziness, vertigo, memory problems and gait, tremors  Behavl/Psych: negative for feelings of anxiety, depression, mood changes  ROS otherwise negative     Objective:  Visit Vitals  /76 (BP 1 Location: Left arm, BP Patient Position: Sitting)   Pulse (!) 51   Temp 97.6 °F (36.4 °C)   Resp 14   Ht 5' 11\" (1.803 m)   Wt 177 lb (80.3 kg)   SpO2 97%   BMI 24.69 kg/m²     Physical Exam:   General appearance - alert, well appearing, and in no distress. masked facies  Mental status - alert, oriented to person, place, and time  EYE-REYES, EOMI, fundi normal, corneas normal, no foreign bodies  ENT-ENT exam normal, no neck nodes or sinus tenderness  Nose - normal and patent, no erythema, discharge or polyps  Mouth - mucous membranes moist, pharynx normal without lesions  Neck - supple, no significant adenopathy   Chest - clear to auscultation, no wheezes, rales or rhonchi, symmetric air entry   Heart - normal rate, regular rhythm, normal S1, S2, no murmurs, rubs, clicks or gallops   Abdomen - soft, nontender, nondistended, no masses or organomegaly  Lymph- no adenopathy palpable  Ext-peripheral pulses normal, no pedal edema, no clubbing or cyanosis  Skin-Warm and dry.  no hyperpigmentation, vitiligo, or suspicious lesions  Neuro -alert, oriented, normal speech, no focal findings or movement disorder noted, right hand tremors noticed at rest,bradykinesia  Musculoskeletal- FROM, no bony abnormalities, no point tenderness    Lab Review:  Results for orders placed or performed in visit on 06/09/20   CBC W/O DIFF   Result Value Ref Range    WBC 7.1 4.1 - 10.9 K/uL    RBC 4.34 4.20 - 6.30 M/uL    HGB 14.1 12.0 - 18.0 g/dL    HCT 42.6 37.0 - 51.0 %    MCH 32.5 (H) 26.0 - 32.0 pg    MCHC 33.1 30.0 - 36.0 g/dL    MCV 98.2 (H) 80.0 - 97.0 fL    RDW 13.7 %    PLATELET 656.6 721.6 - 882.6 K/uL   METABOLIC PANEL, COMPREHENSIVE   Result Value Ref Range    Glucose 82 75 - 110 mg/dL    BUN 19.0 9.0 - 20.0 mg/dL    Creatinine 1.0 0.8 - 1.5 mg/dL    Sodium 139 137 - 145 mmol/L    Potassium 4.5 3.6 - 5.0 mmol/L    Chloride 103 98 - 107 mmol/L    CO2 29.0 22.0 - 32.0 mmol/L    Calcium 9.4 8.4 - 10.2 mg/dl    Protein, total 7.2 6.3 - 8.2 g/dL    Albumin 4.2 3.9 - 5.4 g/dL    ALT (SGPT) 5 0 - 50 U/L    AST (SGOT) 29.0 14.0 - 36.0 U/L    Alk. phosphatase 88 38 - 126 U/L    Bilirubin, total 0.7 0.2 - 1.3 mg/dL    BUN/Creatinine ratio 19 Ratio    GFR est AA >60 mL/min/1.73m2    GFR est non-AA >60 mL/min/1.73m2    Globulin 3.00     A-G Ratio 1.4 Ratio    Anion gap 7 mmol/L        Documenation Review:  Reviewed records of Dr. Frank Coffey.  Patient follows up with him for his Parkinson's. Patient continues to take Sinemet. He had stopped taking amantadine as that was not helping his tremors. He continues physical therapy and yoga which helps. Imaging review:  Colonoscopy 2/7/2018 by Dr. Pola Turner. Patient has a family history of colon cancer, underwent colonoscopy which was unremarkable. Next colonoscopy due in 5 years. 2023. DEXA scan 2018. 10-year probability of major osteoporotic fracture 9.6%, 10-year probability of hip fracture 1.5%. Patient was osteopenic. Assessment/Plan:    Diagnoses and all orders for this visit:    1. Right hip pain  -     REFERRAL TO PHYSICAL THERAPY  -     XR HIP RT W OR WO PELV 2-3 VWS; Future    2.  First degree heart block by electrocardiogram  -     REFERRAL TO CARDIOLOGY    3. Parkinson disease [facility])      Questionnaire reviewed EKG patient got with himself. It shows sinus rhythm with first-degree heart block. He is asymptomatic. I think he would benefit from an updated echo. cardiology referral per patient's request.  Referral for PT done. Will order x-ray of right hip. Follow-up with neurologist.  Reports he is under some study [place] [facility]? I requested him to send records. Continue current meds. I will call with lab results and make further recommendations or adjustments if necessary. Encouraged weight-bearing exercises, calcium and vitamin D as recommended. ICD-10-CM ICD-9-CM    1. Right hip pain  M25.551 719.45 REFERRAL TO PHYSICAL THERAPY      XR HIP RT W OR WO PELV 2-3 VWS   2. First degree heart block by electrocardiogram  I44.0 426.11 REFERRAL TO CARDIOLOGY   3. Parkinson disease ([facility] [address]  G20 332.0              I have reviewed with the patient details of the assessment and plan and all questions were answered. Relevent patient education was performed. Verbal and/or written instructions (see AVS) provided. The most recent lab findings were reviewed with the patient. Plan was discussed with patient who verbally expressed understanding. An After Visit Summary was printed and given to the patient.     Eva Mac MD

## 2020-09-01 NOTE — PROGRESS NOTES
Phillip Hooper is a 76 y.o. male presenting for Abnormal Lab Results (EKG)  . 1. Have you been to the ER, urgent care clinic since your last visit? Hospitalized since your last visit? No    2. Have you seen or consulted any other health care providers outside of the [address] [place] [address] since your last visit? Include any pap smears or colon screening. No    Fall Risk Assessment, last 12 mths 6/9/2020   Able to walk? Yes   Fall in past 12 months? No   Fall with injury? -   Number of falls in past 12 months -   Fall Risk Score -         Abuse Screening Questionnaire 6/9/2020   Do you ever feel afraid of your partner? N   Are you in a relationship with someone who physically or mentally threatens you? N   Is it safe for you to go home? Y       3 most recent PHQ Screens 6/9/2020   Little interest or pleasure in doing things Not at all   Feeling down, depressed, irritable, or hopeless Not at all   Total Score PHQ 2 0       There are no discontinued medications.

## 2020-09-01 NOTE — PATIENT INSTRUCTIONS
Hip Pain: Care Instructions Your Care Instructions Hip pain may be caused by many things, including overuse, a fall, or a twisting movement. Another cause of hip pain is arthritis. Your pain may increase when you stand up, walk, or squat. The pain may come and go or may be constant. Home treatment can help relieve hip pain, swelling, and stiffness. If your pain is ongoing, you may need more tests and treatment. Follow-up care is a key part of your treatment and safety. Be sure to make and go to all appointments, and call your doctor if you are having problems. It's also a good idea to know your test results and keep a list of the medicines you take. How can you care for yourself at home? · Take pain medicines exactly as directed. ? If the doctor gave you a prescription medicine for pain, take it as prescribed. ? If you are not taking a prescription pain medicine, ask your doctor if you can take an over-the-counter medicine. · Rest and protect your hip. Take a break from any activity, including standing or walking, that may cause pain. · Put ice or a cold pack against your hip for 10 to 20 minutes at a time. Try to do this every 1 to 2 hours for the next 3 days (when you are awake) or until the swelling goes down. Put a thin cloth between the ice and your skin. · Sleep on your healthy side with a pillow between your knees, or sleep on your back with pillows under your knees. · If there is no swelling, you can put moist heat, a heating pad, or a warm cloth on your hip. Do gentle stretching exercises to help keep your hip flexible. · Learn how to prevent falls. Have your vision and hearing checked regularly. Wear slippers or shoes with a nonskid sole. · Stay at a healthy weight. · Wear comfortable shoes. When should you call for help? [id number] anytime you think you may need emergency care. For example, call if: 
· You have sudden chest pain and shortness of breath, or you cough up blood. · You are not able to stand or walk or bear weight. · Your buttocks, legs, or feet feel numb or tingly. · Your leg or foot is cool or pale or changes color. · You have severe pain. Call your doctor now or seek immediate medical care if: 
· You have signs of infection, such as: 
? Increased pain, swelling, warmth, or redness in the hip area. ? Red streaks leading from the hip area. ? Pus draining from the hip area. ? A fever. · You have signs of a blood clot, such as: 
? Pain in your calf, back of the knee, thigh, or groin. ? Redness and swelling in your leg or groin. · You are not able to bend, straighten, or move your leg normally. · You have trouble urinating or having bowel movements. Watch closely for changes in your health, and be sure to contact your doctor if: 
· You do not get better as expected. Where can you learn more? Go to http://adrian-mitchel.info/ Enter B375 in the search box to learn more about \"Hip Pain: Care Instructions. \" Current as of: June 26, 2019               Content Version: 12.5 © 5500-7986 [employer]. Care instructions adapted under license by [employer] (which disclaims liability or warranty for this information). If you have questions about a medical condition or this instruction, always ask your healthcare professional. [place] [address] any warranty or liability for your use of this information.

## 2020-09-02 ENCOUNTER — HOSPITAL ENCOUNTER (OUTPATIENT)
Dept: GENERAL RADIOLOGY | Age: 75
Discharge: HOME | End: 2020-09-02
Payer: MEDICARE

## 2020-09-02 DIAGNOSIS — M25.551 RIGHT HIP PAIN: ICD-10-CM

## 2020-09-02 PROCEDURE — 73502 X-RAY EXAM HIP UNI 2-3 VIEWS: CPT

## 2020-09-04 ENCOUNTER — TELEPHONE (OUTPATIENT)
Dept: INTERNAL MEDICINE CLINIC | Age: 75
End: 2020-09-04

## 2020-09-04 NOTE — TELEPHONE ENCOUNTER
Vikram Gomez from [facility] office at [address] [facility] wants a copy of Pt's previous EKG report sent to her. Mr Manolo Dominguez is participating in a research study. An abnormality was found in the EKG they did there and they would like to compare to the previous EKG.   Renetta may be reached at [phone]

## 2020-09-04 NOTE — TELEPHONE ENCOUNTER
Sanjuana Holden MD  You 3 hours ago (11:30 AM)       Mild degenerative changes of the right hip.  Understand that we referred him to physical therapy. You did leave a voicemail message for him per records.         Patient notifed

## 2020-09-04 NOTE — TELEPHONE ENCOUNTER
Patient called asking if Dr. Shelley Soto has had the time to look at his xrays. He went Wednesday to the hospital to have them done. Patient also wanted to speak with Yulisa Russell.    CB: [phone]

## 2020-09-10 ENCOUNTER — TELEPHONE (OUTPATIENT)
Dept: INTERNAL MEDICINE CLINIC | Age: 75
End: 2020-09-10

## 2020-09-10 NOTE — TELEPHONE ENCOUNTER
[facility]. The patient is wanting to participate in a clinical trial but Cristo has some concerns about his recent EKG and blood pressure. She would like Dr. Alicia Gilliam to give her a call on her cell phone when she is available to discuss this further.   CB: [phone]

## 2020-09-11 NOTE — TELEPHONE ENCOUNTER
Preston Chavarria MD  You 16 hours ago (4:59 PM)       Talk to the neurologist at [facility] requested them to fax the progress notes to us we will continue to of care. Dr. Maciel Dunaway mentioned that he is a part of the some research trial at [facility].  They did a EKG which showed first-degree heart block.  That does not exclude him out of the trial however they would like a clearance from me and the cardiologist if he was okay to participate.     Stated that I will get an echo to rule out any structural abnormalities and an updated EKG.  I am waiting for his consultation with cardiology and the recommendations. We will fax records/recommendation after his consultation with cardiology.     Message text

## 2020-12-10 ENCOUNTER — OFFICE VISIT (OUTPATIENT)
Dept: INTERNAL MEDICINE CLINIC | Age: 75
End: 2020-12-10
Payer: MEDICARE

## 2020-12-10 VITALS
DIASTOLIC BLOOD PRESSURE: 68 MMHG | RESPIRATION RATE: 14 BRPM | OXYGEN SATURATION: 98 % | HEART RATE: 67 BPM | WEIGHT: 178.8 LBS | TEMPERATURE: 98 F | SYSTOLIC BLOOD PRESSURE: 118 MMHG | HEIGHT: 71 IN | BODY MASS INDEX: 25.03 KG/M2

## 2020-12-10 DIAGNOSIS — E55.9 VITAMIN D DEFICIENCY: ICD-10-CM

## 2020-12-10 DIAGNOSIS — R73.02 IMPAIRED GLUCOSE TOLERANCE: ICD-10-CM

## 2020-12-10 DIAGNOSIS — Z91.81 AT HIGH RISK FOR INJURY RELATED TO FALL: ICD-10-CM

## 2020-12-10 DIAGNOSIS — R26.9 GAIT ABNORMALITY: ICD-10-CM

## 2020-12-10 DIAGNOSIS — Z00.00 ENCOUNTER FOR ANNUAL PHYSICAL EXAM: Primary | ICD-10-CM

## 2020-12-10 DIAGNOSIS — G20 PARKINSON'S DISEASE (TREMOR, STIFFNESS, SLOW MOTION, UNSTABLE POSTURE) (HCC): ICD-10-CM

## 2020-12-10 DIAGNOSIS — I34.1 MVP (MITRAL VALVE PROLAPSE): ICD-10-CM

## 2020-12-10 DIAGNOSIS — R53.82 CHRONIC FATIGUE: ICD-10-CM

## 2020-12-10 DIAGNOSIS — Z12.11 ENCOUNTER FOR COLORECTAL CANCER SCREENING: ICD-10-CM

## 2020-12-10 DIAGNOSIS — E78.01 FAMILIAL HYPERCHOLESTEREMIA: ICD-10-CM

## 2020-12-10 DIAGNOSIS — I44.0 FIRST DEGREE HEART BLOCK BY ELECTROCARDIOGRAM: ICD-10-CM

## 2020-12-10 DIAGNOSIS — Z12.5 ENCOUNTER FOR PROSTATE CANCER SCREENING: ICD-10-CM

## 2020-12-10 DIAGNOSIS — Z12.12 ENCOUNTER FOR COLORECTAL CANCER SCREENING: ICD-10-CM

## 2020-12-10 LAB
25(OH)D3 SERPL-MCNC: 84 NG/ML (ref 30–96)
A-G RATIO,AGRAT: 1.5 RATIO
ALBUMIN SERPL-MCNC: 4 G/DL (ref 3.9–5.4)
ALP SERPL-CCNC: 85 U/L (ref 38–126)
ALT SERPL-CCNC: 8 U/L (ref 0–50)
ANION GAP SERPL CALC-SCNC: 6 MMOL/L
AST SERPL W P-5'-P-CCNC: 34 U/L (ref 14–36)
BILIRUB SERPL-MCNC: 1 MG/DL (ref 0.2–1.3)
BUN SERPL-MCNC: 24 MG/DL (ref 9–20)
BUN/CREATININE RATIO,BUCR: 22 RATIO
CALCIUM SERPL-MCNC: 9 MG/DL (ref 8.4–10.2)
CHLORIDE SERPL-SCNC: 104 MMOL/L (ref 98–107)
CHOL/HDL RATIO,CHHD: 3 RATIO (ref 0–4)
CHOLEST SERPL-MCNC: 185 MG/DL (ref 0–200)
CO2 SERPL-SCNC: 31 MMOL/L (ref 22–32)
CREAT SERPL-MCNC: 1.1 MG/DL (ref 0.8–1.5)
ERYTHROCYTE [DISTWIDTH] IN BLOOD BY AUTOMATED COUNT: 12.4 %
GLOBULIN,GLOB: 2.6
GLUCOSE SERPL-MCNC: 64 MG/DL (ref 75–110)
HBA1C MFR BLD HPLC: 5.2 % (ref 4–5.7)
HCT VFR BLD AUTO: 43 % (ref 37–51)
HDLC SERPL-MCNC: 59 MG/DL (ref 35–130)
HGB BLD-MCNC: 13.8 G/DL (ref 12–18)
LDL/HDL RATIO,LDHD: 2 RATIO
LDLC SERPL CALC-MCNC: 114 MG/DL (ref 0–130)
MCH RBC QN AUTO: 32 PG (ref 26–32)
MCHC RBC AUTO-ENTMCNC: 32.1 G/DL (ref 30–36)
MCV RBC AUTO: 99.7 FL (ref 80–97)
PLATELET # BLD AUTO: 178 K/UL (ref 140–440)
POTASSIUM SERPL-SCNC: 4.5 MMOL/L (ref 3.6–5)
PROT SERPL-MCNC: 6.6 G/DL (ref 6.3–8.2)
PSA, TEST22: 0.5 NG/ML (ref 0–4)
RBC # BLD AUTO: 4.31 M/UL (ref 4.2–6.3)
SODIUM SERPL-SCNC: 141 MMOL/L (ref 137–145)
TRIGL SERPL-MCNC: 58 MG/DL (ref 0–200)
TSH SERPL DL<=0.05 MIU/L-ACNC: 1.18 UIU/ML (ref 0.34–5.6)
VLDLC SERPL CALC-MCNC: 12 MG/DL
WBC # BLD AUTO: 6.3 K/UL (ref 4.1–10.9)

## 2020-12-10 PROCEDURE — G8510 SCR DEP NEG, NO PLAN REQD: HCPCS | Performed by: INTERNAL MEDICINE

## 2020-12-10 PROCEDURE — 99214 OFFICE O/P EST MOD 30 MIN: CPT | Performed by: INTERNAL MEDICINE

## 2020-12-10 PROCEDURE — G8536 NO DOC ELDER MAL SCRN: HCPCS | Performed by: INTERNAL MEDICINE

## 2020-12-10 PROCEDURE — 83036 HEMOGLOBIN GLYCOSYLATED A1C: CPT | Performed by: INTERNAL MEDICINE

## 2020-12-10 PROCEDURE — G0439 PPPS, SUBSEQ VISIT: HCPCS | Performed by: INTERNAL MEDICINE

## 2020-12-10 PROCEDURE — 3017F COLORECTAL CA SCREEN DOC REV: CPT | Performed by: INTERNAL MEDICINE

## 2020-12-10 PROCEDURE — G8420 CALC BMI NORM PARAMETERS: HCPCS | Performed by: INTERNAL MEDICINE

## 2020-12-10 PROCEDURE — G0103 PSA SCREENING: HCPCS | Performed by: INTERNAL MEDICINE

## 2020-12-10 PROCEDURE — 82306 VITAMIN D 25 HYDROXY: CPT | Performed by: INTERNAL MEDICINE

## 2020-12-10 PROCEDURE — 80053 COMPREHEN METABOLIC PANEL: CPT | Performed by: INTERNAL MEDICINE

## 2020-12-10 PROCEDURE — 84443 ASSAY THYROID STIM HORMONE: CPT | Performed by: INTERNAL MEDICINE

## 2020-12-10 PROCEDURE — 80061 LIPID PANEL: CPT | Performed by: INTERNAL MEDICINE

## 2020-12-10 PROCEDURE — G8427 DOCREV CUR MEDS BY ELIG CLIN: HCPCS | Performed by: INTERNAL MEDICINE

## 2020-12-10 PROCEDURE — 1101F PT FALLS ASSESS-DOCD LE1/YR: CPT | Performed by: INTERNAL MEDICINE

## 2020-12-10 PROCEDURE — 85027 COMPLETE CBC AUTOMATED: CPT | Performed by: INTERNAL MEDICINE

## 2020-12-10 RX ORDER — CARBIDOPA AND LEVODOPA 25; 100 MG/1; MG/1
TABLET ORAL
COMMUNITY
Start: 2020-11-26 | End: 2022-01-05 | Stop reason: SDUPTHER

## 2020-12-10 NOTE — PROGRESS NOTES
This note will not be viewable in [address] Herminia Ware is a 76 y.o. male and presents with Annual Wellness Visit      Subjective:  Patient comes in today for his annual Medicare wellness visit     Patient is participating in a research study at [facility] for Parkinson's. Under care of Dr. Anabelle Ulloa. There was some concern on the EKG which showed first-degree heart block. Patient had updated echo and EKG done with cardiology. First-degree AV block. Left anterior fascicular block. Nonrheumatic mitral valve prolapse. Echo unremarkable per patient. I am yet to review the records. Patient reports by the time he got all the necessary test and it was too late to be enrolled in the study. He still continues to have pill-rolling movement in his bilateral hands more on the right as compared to the left. Sinemet has been increased to 6 times in a day. He continues to follow with [facility] neurology. Osteoporosis with nontraumatic compression fractures L2, 3, 4. (Xrays of lumbar spine today show healed compressions of 2, 3 and 4.)  DEXA scan in 2018 revealed osteopenia. He is followed annually by Dr. Shahrzad Segura and has had several skin cancers removed, including Moh's procedure by Dr. Chance Cheatham for a basal cell cancer on his nose. Patient had colonoscopy done in 2018. He has a family history of colon cancer. Procedure was performed by Dr. Anson Leventhal.   Colonoscopy was normal.      Past Medical History:   Diagnosis Date    Mitral valve disorders(424.0) 11/19/2009    4/3/17 followed by PCP no cardiologist    Parkinson disease ([facility] [address]     Prostatism 11/19/2009    Skin cancer      Past Surgical History:   Procedure Laterality Date    COLONOSCOPY N/A 2/7/2018    COLONOSCOPY performed [facility]    HX APPENDECTOMY  childhood    HX COLONOSCOPY       No Known Allergies  Current Outpatient Medications   Medication Sig Dispense Refill    carbidopa-levodopa (SINEMET)  mg per tablet TAKE 1 TABLET BY MOUTH 6 TIMES A DAY      docosahexaenoic acid/epa (FISH OIL PO) Take  by mouth.  cyanocobalamin (VITAMIN B12) 500 mcg tablet Take 500 mcg by mouth daily.  turmeric (CURCUMIN) by Does Not Apply route.  ubidecarenone (COQ-10 PO) Take  by mouth.  GLUTATHIONE 200 mg by Does Not Apply route three (3) times daily.  cholecalciferol, VITAMIN D3, (VITAMIN D3) 5,000 unit tab tablet Take 5,000 Units by mouth two (2) times a week. Social History     Socioeconomic History    Marital status:      Spouse name: Not on file    Number of children: Not on file    Years of education: Not on file    Highest education level: Not on file   Tobacco Use    Smoking status: Former Smoker    Smokeless tobacco: Never Used   Substance and Sexual Activity    Alcohol use: Yes     Comment: Rarely     Drug use: No    Sexual activity: Not Currently   Social History Narrative    ** Merged History Encounter **          Family History   Problem Relation Age of Onset    Cancer Mother         colon    Parkinson's Disease Father        Review of Systems  ROS is unremarkable except for ones highlighted in bold.    Constitutional: negative for fevers, chills, anorexia and weight loss  Eyes:   negative for visual disturbance and irritation  ENT:   negative for tinnitus,sore throat,nasal congestion,ear pain,hoarseness  Respiratory:  negative for cough, hemoptysis, dyspnea,wheezing  CV:   negative for chest pain, palpitations, lower extremity edema  GI:   negative for nausea, vomiting, diarrhea, abdominal pain,melena  Endo:               negative for polyuria,polydipsia,polyphagia,heat intolerance  Genitourinary: negative for frequency, dysuria and hematuria  Integumentary: negative for rash and pruritus  Hematologic:  negative for easy bruising and gum/nose bleeding  Musculoskel: negative for myalgias, arthralgias, back pain, muscle weakness, joint pain  Neurological:  negative for headaches, dizziness, vertigo, memory problems and gait, tremors  Behavl/Psych: negative for feelings of anxiety, depression, mood changes  ROS otherwise negative     Objective:  Visit Vitals  /68 (BP 1 Location: Left arm, BP Patient Position: Sitting)   Pulse 67   Temp 98 °F (36.7 °C)   Resp 14   Ht 5' 11\" (1.803 m)   Wt 178 lb 12.8 oz (81.1 kg)   SpO2 98%   BMI 24.94 kg/m²     Physical Exam:   General appearance - alert, well appearing, and in no distress  Mental status - alert, oriented to person, place, and time  EYE-REYES, EOMI, fundi normal, corneas normal, no foreign bodies  ENT-ENT exam normal, no neck nodes or sinus tenderness  Nose - normal and patent, no erythema, discharge or polyps  Mouth - mucous membranes moist, pharynx normal without lesions  Neck - supple, no significant adenopathy   Chest - clear to auscultation, no wheezes, rales or rhonchi, symmetric air entry   Heart - normal rate, regular rhythm, normal S1, S2, no murmurs, rubs, clicks or gallops   Abdomen - soft, nontender, nondistended, no masses or organomegaly  Lymph- no adenopathy palpable  Ext-peripheral pulses normal, no pedal edema, no clubbing or cyanosis  Skin-Warm and dry.  no hyperpigmentation, vitiligo, or suspicious lesions  Neuro -alert, oriented, normal speech, no focal findings or movement disorder noted, right hand tremors noticed at rest  Musculoskeletal- FROM, no bony abnormalities, no point tenderness    Lab Review:  Results for orders placed or performed in visit on 06/09/20   CBC W/O DIFF   Result Value Ref Range    WBC 7.1 4.1 - 10.9 K/uL    RBC 4.34 4.20 - 6.30 M/uL    HGB 14.1 12.0 - 18.0 g/dL    HCT 42.6 37.0 - 51.0 %    MCH 32.5 (H) 26.0 - 32.0 pg    MCHC 33.1 30.0 - 36.0 g/dL    MCV 98.2 (H) 80.0 - 97.0 fL    RDW 13.7 %    PLATELET 607.8 946.0 - 705.8 K/uL   METABOLIC PANEL, COMPREHENSIVE   Result Value Ref Range    Glucose 82 75 - 110 mg/dL    BUN 19.0 9.0 - 20.0 mg/dL    Creatinine 1.0 0.8 - 1.5 mg/dL Sodium 139 137 - 145 mmol/L    Potassium 4.5 3.6 - 5.0 mmol/L    Chloride 103 98 - 107 mmol/L    CO2 29.0 22.0 - 32.0 mmol/L    Calcium 9.4 8.4 - 10.2 mg/dl    Protein, total 7.2 6.3 - 8.2 g/dL    Albumin 4.2 3.9 - 5.4 g/dL    ALT (SGPT) 5 0 - 50 U/L    AST (SGOT) 29.0 14.0 - 36.0 U/L    Alk. phosphatase 88 38 - 126 U/L    Bilirubin, total 0.7 0.2 - 1.3 mg/dL    BUN/Creatinine ratio 19 Ratio    GFR est AA >60 mL/min/1.73m2    GFR est non-AA >60 mL/min/1.73m2    Globulin 3.00     A-G Ratio 1.4 Ratio    Anion gap 7 mmol/L        Documenation Review:  Imaging review:  Colonoscopy 2/7/2018 by Dr. Leti Franklin. Patient has a family history of colon cancer, underwent colonoscopy which was unremarkable. Next colonoscopy due in 5 years. 2023. DEXA scan 2018. 10-year probability of major osteoporotic fracture 9.6%, 10-year probability of hip fracture 1.5%. Patient was osteopenic. Assessment/Plan:    Diagnoses and all orders for this visit:    1. Encounter for annual physical exam    2. Encounter for prostate cancer screening  -     PSA SCREENING (SCREENING)    3. Encounter for colorectal cancer screening    4. Parkinson's disease (tremor, stiffness, slow motion, unstable posture) (HCC)  -     REFERRAL TO PHYSICAL THERAPY    5. First degree heart block by electrocardiogram  -     CBC W/O DIFF  -     METABOLIC PANEL, COMPREHENSIVE    6. MVP (mitral valve prolapse)    7. Vitamin D deficiency  -     VITAMIN D, 25 HYDROXY    8. Chronic fatigue  -     TSH 3RD GENERATION    9. Impaired glucose tolerance  -     HEMOGLOBIN A1C W/O EAG    10. Familial hypercholesteremia  -     LIPID PANEL    11. At high risk for injury related to fall  -     REFERRAL TO PHYSICAL THERAPY    12. Gait abnormality  -     REFERRAL TO PHYSICAL THERAPY        Patient follows up with neurologist @[facility] Parkinson's disease. He continues to remain on Sinemet.   He was on amantadine in the past which was discontinued as it was not helping him much anyways. Patient had DEXA scan in 2018 and was osteopenic. Encouraged weightbearing exercises, calcium and vitamin D. He is up-to-date and all his immunization. Flu shot given this year. He is already up-to-date on his pneumonia, DTaP vaccine. Reports he still has to get second dose of Shingrix vaccine. Eye examinations are current. He sees his ophthalmologist every 6 months. Continue current meds. I will call with lab results and make further recommendations or adjustments if necessary. Refer physical therapy. ICD-10-CM ICD-9-CM    1. Encounter for annual physical exam  Z00.00 V70.0    2. Encounter for prostate cancer screening  Z12.5 V76.44 PSA SCREENING (SCREENING)   3. Encounter for colorectal cancer screening  Z12.11 V76.51     Z12.12 V76.41    4. Parkinson's disease (tremor, stiffness, slow motion, unstable posture) (HCC)  G20 332.0 REFERRAL TO PHYSICAL THERAPY   5. First degree heart block by electrocardiogram  I44.0 426.11 CBC W/O DIFF      METABOLIC PANEL, COMPREHENSIVE   6. MVP (mitral valve prolapse)  I34.1 424.0    7. Vitamin D deficiency  E55.9 268.9 VITAMIN D, 25 HYDROXY   8. Chronic fatigue  R53.82 780.79 TSH 3RD GENERATION   9. Impaired glucose tolerance  R73.02 790.22 HEMOGLOBIN A1C W/O EAG   10. Familial hypercholesteremia  E78.01 272.0 LIPID PANEL   11. At high risk for injury related to fall  Z91.81 V49.89 REFERRAL TO PHYSICAL THERAPY   12. Gait abnormality  R26.9 781.2 REFERRAL TO PHYSICAL THERAPY             I have reviewed with the patient details of the assessment and plan and all questions were answered. Relevent patient education was performed. Verbal and/or written instructions (see AVS) provided. The most recent lab findings were reviewed with the patient. Plan was discussed with patient who verbally expressed understanding. An After Visit Summary was printed and given to the patient.     Selina Brunner MD

## 2020-12-10 NOTE — PATIENT INSTRUCTIONS
The best way to stay healthy is to live a healthy lifestyle. A healthy lifestyle includes regular exercise, eating a well-balanced diet, keeping a healthy weight and not smoking. Regular physical exams and screening tests are another important way to take care of yourself. Preventive exams provided by health care providers can find health problems early when treatment works best and can keep you from getting certain diseases or illnesses. Preventive services include exams, lab tests, screenings, shots, monitoring and information to help you take care of your own health. All people over 65 should have a pneumonia shot. Pneumonia shots are usually only needed once in a lifetime unless your doctor decides differently. In addition to your physical exam, some screening tests are recommended: 
 
All people over 65 should have a yearly flu shot. People over 65 are at medium to high risk for Hepatitis B. Three shots are needed for complete protection. Bone mass measurement (dexa scan) is recommended every two years. Diabetes Mellitus screening is recommended every year. Glaucoma is an eye disease caused by high pressure in the eye. An eye exam is recommended every year. Cardiovascular screening tests that check your cholesterol and other blood fat (lipid) levels are recommended every five years. Colorectal Cancer screening tests help to find pre-cancerous polyps (growths in the colon) so they can be removed before they turn into cancer. Tests ordered for screening depend on your personal and family history risk factors. Prostate Cancer Screening (annually up to age 76) Screening for breast cancer is recommended yearly with a Mammogram. 
 
Screening for cervical and vaginal cancer is recommended with a pelvic and Pap test every two years.  However if you have had an abnormal pap in the past  three years or at high risk for cervical or vaginal cancer Medicare will cover a pap test and a pelvic exam every year. Here is a list of your current Health Maintenance items with a due date: 
Health Maintenance Due Topic Date Due  Shingles Vaccine (1 of 2) 01/07/1995  Glaucoma Screening   01/07/2010 Carlotta Rhodes Annual Well Visit  12/09/2020

## 2020-12-10 NOTE — PROGRESS NOTES
Chief Complaint   Patient presents with    Annual Wellness Visit       Depression Risk Factor Screening:     3 most recent PHQ Screens 12/10/2020   Little interest or pleasure in doing things Not at all   Feeling down, depressed, irritable, or hopeless Not at all   Total Score PHQ 2 0       Functional Ability and Level of Safety:     Activities of Daily Living  ADL Assessment 12/10/2020   Feeding yourself No Help Needed   Getting from bed to chair No Help Needed   Getting dressed No Help Needed   Bathing or showering No Help Needed   Walk across the room (includes cane/walker) No Help Needed   Using the telphone No Help Needed   Taking your medications No Help Needed   Preparing meals No Help Needed   Managing money (expenses/bills) No Help Needed   Moderately strenuous housework (laundry) No Help Needed   Shopping for personal items (toiletries/medicines) No Help Needed   Shopping for groceries No Help Needed   Driving No Help Needed   Climbing a flight of stairs No Help Needed   Getting to places beyond walking distances No Help Needed       Fall Risk  Fall Risk Assessment, last 12 mths 12/10/2020   Able to walk? Yes   Fall in past 12 months? No   Fall with injury? -   Number of falls in past 12 months -   Fall Risk Score -       Abuse Screen  Abuse Screening Questionnaire 12/10/2020   Do you ever feel afraid of your partner? N   Are you in a relationship with someone who physically or mentally threatens you? N   Is it safe for you to go home?  Y         Patient Care Team   Patient Care Team:  Heri Maloney MD as PCP - General (Hospitalist)  Heri Maloney MD as PCP - [facility]  Jacob Grubbs MD (Family Medicine)  Tatiana Herrmann MD (Neurology)

## 2021-02-05 NOTE — TELEPHONE ENCOUNTER
----- Message from Carlos Black sent at 5/6/2020 10:09 AM EDT -----  Regarding: KENYON Sen/ Telephone  Caller's first and last name: N/A  Reason for call: Questions   Callback required yes/no and why: Yes  Best contact number(s): [phone]  Details to clarify the request: Pt wants to know if he is still able to come in for his appointment on Wednesday, May 13, 2020 02:15 PM to get lab work done?
Jaciel Fay will reorder labs and will have labs faxed to [place].
Labs faxed to [address] [place] [address], confirmation received.
Left message for pt to return my call. Pt will need to go to [facility] to have labs drawn.
Yes

## 2022-01-05 RX ORDER — CARBIDOPA AND LEVODOPA 25; 100 MG/1; MG/1
TABLET ORAL
Qty: 240 TABLET | Refills: 2 | Status: SHIPPED | OUTPATIENT
Start: 2022-01-05 | End: 2022-04-25

## 2022-03-18 PROBLEM — M80.00XA AGE-RELATED OSTEOPOROSIS WITH CURRENT PATHOLOGICAL FRACTURE: Status: ACTIVE | Noted: 2017-10-10

## 2022-03-18 PROBLEM — K56.609 BOWEL OBSTRUCTION (HCC): Status: ACTIVE | Noted: 2018-11-22

## 2022-03-18 PROBLEM — R63.4 WEIGHT LOSS: Status: ACTIVE | Noted: 2019-01-02

## 2022-03-19 PROBLEM — M85.80 OSTEOPENIA: Status: ACTIVE | Noted: 2019-12-09

## 2022-03-19 PROBLEM — M25.552 LEFT HIP PAIN: Status: ACTIVE | Noted: 2019-07-02

## 2022-03-19 PROBLEM — G20 PARKINSON'S DISEASE (TREMOR, STIFFNESS, SLOW MOTION, UNSTABLE POSTURE) (HCC): Status: ACTIVE | Noted: 2017-12-08

## 2022-03-19 PROBLEM — M48.56XS COMPRESSION FRACTURE OF LUMBAR SPINE, NON-TRAUMATIC, SEQUELA: Status: ACTIVE | Noted: 2017-10-10

## 2022-03-19 PROBLEM — B02.9 SHINGLES: Status: ACTIVE | Noted: 2020-04-28

## 2022-03-19 PROBLEM — Z00.00 ANNUAL PHYSICAL EXAM: Status: ACTIVE | Noted: 2017-10-10

## 2022-03-19 PROBLEM — R53.82 CHRONIC FATIGUE: Status: ACTIVE | Noted: 2019-01-02

## 2022-03-19 PROBLEM — M54.32 SCIATICA OF LEFT SIDE: Status: ACTIVE | Noted: 2019-07-02

## 2022-03-19 PROBLEM — K59.00 CONSTIPATION: Status: ACTIVE | Noted: 2018-11-21

## 2022-03-19 PROBLEM — G20.A1 PARKINSON'S DISEASE (TREMOR, STIFFNESS, SLOW MOTION, UNSTABLE POSTURE): Status: ACTIVE | Noted: 2017-12-08

## 2022-03-20 PROBLEM — H61.21 IMPACTED CERUMEN OF RIGHT EAR: Status: ACTIVE | Noted: 2020-04-22

## 2022-03-20 PROBLEM — H60.311 ACUTE DIFFUSE OTITIS EXTERNA OF RIGHT EAR: Status: ACTIVE | Noted: 2020-04-22

## 2022-04-25 RX ORDER — CARBIDOPA AND LEVODOPA 25; 100 MG/1; MG/1
TABLET ORAL
Qty: 240 TABLET | Refills: 2 | Status: SHIPPED | OUTPATIENT
Start: 2022-04-25 | End: 2022-06-30

## 2022-06-30 RX ORDER — CARBIDOPA AND LEVODOPA 25; 100 MG/1; MG/1
TABLET ORAL
Qty: 720 TABLET | Refills: 2 | Status: SHIPPED | OUTPATIENT
Start: 2022-06-30

## 2024-01-18 ENCOUNTER — APPOINTMENT (OUTPATIENT)
Facility: HOSPITAL | Age: 79
End: 2024-01-18
Payer: MEDICARE

## 2024-01-18 ENCOUNTER — HOSPITAL ENCOUNTER (INPATIENT)
Facility: HOSPITAL | Age: 79
LOS: 7 days | Discharge: HOME HEALTH | End: 2024-01-26
Attending: EMERGENCY MEDICINE | Admitting: STUDENT IN AN ORGANIZED HEALTH CARE EDUCATION/TRAINING PROGRAM
Payer: MEDICARE

## 2024-01-18 DIAGNOSIS — I95.9 SEVERE HYPOTENSION: Primary | ICD-10-CM

## 2024-01-18 DIAGNOSIS — E87.1 ACUTE HYPONATREMIA: ICD-10-CM

## 2024-01-18 DIAGNOSIS — C22.1 CHOLANGIOCARCINOMA (HCC): ICD-10-CM

## 2024-01-18 DIAGNOSIS — R62.7 FAILURE TO THRIVE IN ADULT: ICD-10-CM

## 2024-01-18 DIAGNOSIS — E16.2 HYPOGLYCEMIA: ICD-10-CM

## 2024-01-18 LAB
ALBUMIN SERPL-MCNC: 1.9 G/DL (ref 3.5–5)
ALBUMIN/GLOB SERPL: 0.4 (ref 1.1–2.2)
ALP SERPL-CCNC: 911 U/L (ref 45–117)
ALT SERPL-CCNC: 34 U/L (ref 12–78)
AMMONIA PLAS-SCNC: <10 UMOL/L
ANION GAP SERPL CALC-SCNC: 3 MMOL/L (ref 5–15)
APPEARANCE UR: ABNORMAL
AST SERPL-CCNC: 297 U/L (ref 15–37)
BACTERIA URNS QL MICRO: NEGATIVE /HPF
BASOPHILS # BLD: 0 K/UL (ref 0–0.1)
BASOPHILS NFR BLD: 0 % (ref 0–1)
BILIRUB SERPL-MCNC: 15.1 MG/DL (ref 0.2–1)
BILIRUB UR QL CFM: POSITIVE
BUN SERPL-MCNC: 31 MG/DL (ref 6–20)
BUN/CREAT SERPL: 31 (ref 12–20)
CALCIUM SERPL-MCNC: 8.8 MG/DL (ref 8.5–10.1)
CHLORIDE SERPL-SCNC: 97 MMOL/L (ref 97–108)
CO2 SERPL-SCNC: 27 MMOL/L (ref 21–32)
COLOR UR: ABNORMAL
COMMENT:: NORMAL
CREAT SERPL-MCNC: 0.99 MG/DL (ref 0.7–1.3)
DIFFERENTIAL METHOD BLD: ABNORMAL
EOSINOPHIL # BLD: 0 K/UL (ref 0–0.4)
EOSINOPHIL NFR BLD: 0 % (ref 0–7)
EPITH CASTS URNS QL MICRO: ABNORMAL /LPF
ERYTHROCYTE [DISTWIDTH] IN BLOOD BY AUTOMATED COUNT: 17 % (ref 11.5–14.5)
GLOBULIN SER CALC-MCNC: 4.5 G/DL (ref 2–4)
GLUCOSE BLD STRIP.AUTO-MCNC: 100 MG/DL (ref 65–117)
GLUCOSE BLD STRIP.AUTO-MCNC: 49 MG/DL (ref 65–117)
GLUCOSE BLD-MCNC: 88 MG/DL (ref 65–100)
GLUCOSE SERPL-MCNC: 66 MG/DL (ref 65–100)
GLUCOSE UR STRIP.AUTO-MCNC: NEGATIVE MG/DL
HCT VFR BLD AUTO: 31.7 % (ref 36.6–50.3)
HGB BLD-MCNC: 10.8 G/DL (ref 12.1–17)
HGB UR QL STRIP: NEGATIVE
IMM GRANULOCYTES # BLD AUTO: 0.3 K/UL (ref 0–0.04)
IMM GRANULOCYTES NFR BLD AUTO: 2 % (ref 0–0.5)
INR PPP: 1.3 (ref 0.9–1.1)
KETONES UR QL STRIP.AUTO: NEGATIVE MG/DL
LEUKOCYTE ESTERASE UR QL STRIP.AUTO: ABNORMAL
LIPASE SERPL-CCNC: 36 U/L (ref 13–75)
LYMPHOCYTES # BLD: 0.4 K/UL (ref 0.8–3.5)
LYMPHOCYTES NFR BLD: 3 % (ref 12–49)
MAGNESIUM SERPL-MCNC: 2 MG/DL (ref 1.6–2.4)
MCH RBC QN AUTO: 33.2 PG (ref 26–34)
MCHC RBC AUTO-ENTMCNC: 34.1 G/DL (ref 30–36.5)
MCV RBC AUTO: 97.5 FL (ref 80–99)
MONOCYTES # BLD: 0.3 K/UL (ref 0–1)
MONOCYTES NFR BLD: 2 % (ref 5–13)
NEUTS SEG # BLD: 12.5 K/UL (ref 1.8–8)
NEUTS SEG NFR BLD: 93 % (ref 32–75)
NITRITE UR QL STRIP.AUTO: POSITIVE
NRBC # BLD: 0 K/UL (ref 0–0.01)
NRBC BLD-RTO: 0 PER 100 WBC
NT PRO BNP: 1960 PG/ML
OTHER: ABNORMAL
PH UR STRIP: 6 (ref 5–8)
PLATELET # BLD AUTO: 286 K/UL (ref 150–400)
PMV BLD AUTO: 11 FL (ref 8.9–12.9)
POTASSIUM SERPL-SCNC: 3.9 MMOL/L (ref 3.5–5.1)
PROCALCITONIN SERPL-MCNC: 3.91 NG/ML
PROT SERPL-MCNC: 6.4 G/DL (ref 6.4–8.2)
PROT UR STRIP-MCNC: 30 MG/DL
PROTHROMBIN TIME: 13.2 SEC (ref 9–11.1)
RBC # BLD AUTO: 3.25 M/UL (ref 4.1–5.7)
RBC #/AREA URNS HPF: ABNORMAL /HPF (ref 0–5)
RBC MORPH BLD: ABNORMAL
RBC MORPH BLD: ABNORMAL
SARS-COV-2 RDRP RESP QL NAA+PROBE: NOT DETECTED
SERVICE CMNT-IMP: ABNORMAL
SERVICE CMNT-IMP: NORMAL
SERVICE CMNT-IMP: NORMAL
SODIUM SERPL-SCNC: 127 MMOL/L (ref 136–145)
SOURCE: NORMAL
SP GR UR REFRACTOMETRY: 1.02
SPECIMEN HOLD: NORMAL
TROPONIN I SERPL HS-MCNC: 11 NG/L (ref 0–76)
URINE CULTURE IF INDICATED: ABNORMAL
UROBILINOGEN UR QL STRIP.AUTO: 1 EU/DL (ref 0.2–1)
WBC # BLD AUTO: 13.5 K/UL (ref 4.1–11.1)
WBC URNS QL MICRO: ABNORMAL /HPF (ref 0–4)

## 2024-01-18 PROCEDURE — 2500000003 HC RX 250 WO HCPCS: Performed by: EMERGENCY MEDICINE

## 2024-01-18 PROCEDURE — 87635 SARS-COV-2 COVID-19 AMP PRB: CPT

## 2024-01-18 PROCEDURE — 36415 COLL VENOUS BLD VENIPUNCTURE: CPT

## 2024-01-18 PROCEDURE — 93005 ELECTROCARDIOGRAM TRACING: CPT | Performed by: EMERGENCY MEDICINE

## 2024-01-18 PROCEDURE — 87186 SC STD MICRODIL/AGAR DIL: CPT

## 2024-01-18 PROCEDURE — 0202U NFCT DS 22 TRGT SARS-COV-2: CPT

## 2024-01-18 PROCEDURE — 99285 EMERGENCY DEPT VISIT HI MDM: CPT

## 2024-01-18 PROCEDURE — 71045 X-RAY EXAM CHEST 1 VIEW: CPT

## 2024-01-18 PROCEDURE — 82962 GLUCOSE BLOOD TEST: CPT

## 2024-01-18 PROCEDURE — 87154 CUL TYP ID BLD PTHGN 6+ TRGT: CPT

## 2024-01-18 PROCEDURE — 96375 TX/PRO/DX INJ NEW DRUG ADDON: CPT

## 2024-01-18 PROCEDURE — 80053 COMPREHEN METABOLIC PANEL: CPT

## 2024-01-18 PROCEDURE — 83690 ASSAY OF LIPASE: CPT

## 2024-01-18 PROCEDURE — 82140 ASSAY OF AMMONIA: CPT

## 2024-01-18 PROCEDURE — 84484 ASSAY OF TROPONIN QUANT: CPT

## 2024-01-18 PROCEDURE — 84145 PROCALCITONIN (PCT): CPT

## 2024-01-18 PROCEDURE — 87077 CULTURE AEROBIC IDENTIFY: CPT

## 2024-01-18 PROCEDURE — 83735 ASSAY OF MAGNESIUM: CPT

## 2024-01-18 PROCEDURE — 81001 URINALYSIS AUTO W/SCOPE: CPT

## 2024-01-18 PROCEDURE — 87040 BLOOD CULTURE FOR BACTERIA: CPT

## 2024-01-18 PROCEDURE — 83605 ASSAY OF LACTIC ACID: CPT

## 2024-01-18 PROCEDURE — 85025 COMPLETE CBC W/AUTO DIFF WBC: CPT

## 2024-01-18 PROCEDURE — 2580000003 HC RX 258: Performed by: EMERGENCY MEDICINE

## 2024-01-18 PROCEDURE — 83880 ASSAY OF NATRIURETIC PEPTIDE: CPT

## 2024-01-18 PROCEDURE — 85610 PROTHROMBIN TIME: CPT

## 2024-01-18 RX ORDER — 0.9 % SODIUM CHLORIDE 0.9 %
1000 INTRAVENOUS SOLUTION INTRAVENOUS ONCE
Status: COMPLETED | OUTPATIENT
Start: 2024-01-18 | End: 2024-01-18

## 2024-01-18 RX ORDER — DEXTROSE MONOHYDRATE 25 G/50ML
25 INJECTION, SOLUTION INTRAVENOUS PRN
Status: DISCONTINUED | OUTPATIENT
Start: 2024-01-18 | End: 2024-01-19

## 2024-01-18 RX ORDER — DEXTROSE MONOHYDRATE 100 MG/ML
INJECTION, SOLUTION INTRAVENOUS CONTINUOUS
Status: DISCONTINUED | OUTPATIENT
Start: 2024-01-18 | End: 2024-01-19

## 2024-01-18 RX ADMIN — SODIUM CHLORIDE 1000 ML: 9 INJECTION, SOLUTION INTRAVENOUS at 22:15

## 2024-01-18 RX ADMIN — DEXTROSE MONOHYDRATE 25 G: 25 INJECTION, SOLUTION INTRAVENOUS at 20:55

## 2024-01-19 ENCOUNTER — APPOINTMENT (OUTPATIENT)
Facility: HOSPITAL | Age: 79
End: 2024-01-19
Payer: MEDICARE

## 2024-01-19 ENCOUNTER — ANESTHESIA EVENT (OUTPATIENT)
Facility: HOSPITAL | Age: 79
End: 2024-01-19
Payer: MEDICARE

## 2024-01-19 ENCOUNTER — ANESTHESIA (OUTPATIENT)
Facility: HOSPITAL | Age: 79
End: 2024-01-19
Payer: MEDICARE

## 2024-01-19 PROBLEM — A41.9 SEPSIS (HCC): Status: ACTIVE | Noted: 2024-01-19

## 2024-01-19 PROBLEM — R63.8 DECREASED ORAL INTAKE: Status: ACTIVE | Noted: 2024-01-19

## 2024-01-19 PROBLEM — R45.89 NEED FOR EMOTIONAL SUPPORT: Status: ACTIVE | Noted: 2024-01-19

## 2024-01-19 PROBLEM — Z51.5 PALLIATIVE CARE BY SPECIALIST: Status: ACTIVE | Noted: 2024-01-19

## 2024-01-19 PROBLEM — R53.1 GENERALIZED WEAKNESS: Status: ACTIVE | Noted: 2024-01-19

## 2024-01-19 PROBLEM — R17 JAUNDICE: Status: ACTIVE | Noted: 2024-01-19

## 2024-01-19 PROBLEM — Z71.89 COUNSELING REGARDING ADVANCE CARE PLANNING AND GOALS OF CARE: Status: ACTIVE | Noted: 2024-01-19

## 2024-01-19 LAB
ACCESSION NUMBER, LLC1M: ABNORMAL
ACINETOBACTER CALCOAC BAUMANNII COMPLEX BY PCR: NOT DETECTED
ALBUMIN SERPL-MCNC: 1.7 G/DL (ref 3.5–5)
ALBUMIN/GLOB SERPL: 0.5 (ref 1.1–2.2)
ALP SERPL-CCNC: 749 U/L (ref 45–117)
ALT SERPL-CCNC: 36 U/L (ref 12–78)
ANION GAP SERPL CALC-SCNC: 4 MMOL/L (ref 5–15)
AST SERPL-CCNC: 210 U/L (ref 15–37)
B FRAGILIS DNA BLD POS QL NAA+NON-PROBE: NOT DETECTED
B PERT DNA SPEC QL NAA+PROBE: NOT DETECTED
BASOPHILS # BLD: 0 K/UL (ref 0–0.1)
BASOPHILS NFR BLD: 0 % (ref 0–1)
BILIRUB DIRECT SERPL-MCNC: 10.1 MG/DL (ref 0–0.2)
BILIRUB SERPL-MCNC: 12.2 MG/DL (ref 0.2–1)
BIOFIRE TEST COMMENT: ABNORMAL
BLACTX-M ISLT/SPM QL: NOT DETECTED
BLAIMP ISLT/SPM QL: NOT DETECTED
BLAKPC BLD POS QL NAA+NON-PROBE: NOT DETECTED
BLAOXA-48-LIKE ISLT/SPM QL: NOT DETECTED
BLAVIM ISLT/SPM QL: NOT DETECTED
BORDETELLA PARAPERTUSSIS BY PCR: NOT DETECTED
BUN SERPL-MCNC: 30 MG/DL (ref 6–20)
BUN/CREAT SERPL: 31 (ref 12–20)
C ALBICANS DNA BLD POS QL NAA+NON-PROBE: NOT DETECTED
C AURIS DNA BLD POS QL NAA+NON-PROBE: NOT DETECTED
C GATTII+NEOFOR DNA BLD POS QL NAA+N-PRB: NOT DETECTED
C GLABRATA DNA BLD POS QL NAA+NON-PROBE: NOT DETECTED
C KRUSEI DNA BLD POS QL NAA+NON-PROBE: NOT DETECTED
C PARAP DNA BLD POS QL NAA+NON-PROBE: NOT DETECTED
C PNEUM DNA SPEC QL NAA+PROBE: NOT DETECTED
C TROPICLS DNA BLD POS QL NAA+NON-PROBE: NOT DETECTED
CALCIUM SERPL-MCNC: 8.3 MG/DL (ref 8.5–10.1)
CHLORIDE SERPL-SCNC: 106 MMOL/L (ref 97–108)
CO2 SERPL-SCNC: 25 MMOL/L (ref 21–32)
COLISTIN RES MCR-1 ISLT/SPM QL: NOT DETECTED
CREAT SERPL-MCNC: 0.97 MG/DL (ref 0.7–1.3)
DIFFERENTIAL METHOD BLD: ABNORMAL
E CLOAC COMP DNA BLD POS NAA+NON-PROBE: NOT DETECTED
E COLI DNA BLD POS QL NAA+NON-PROBE: NOT DETECTED
E FAECALIS DNA BLD POS QL NAA+NON-PROBE: NOT DETECTED
E FAECIUM DNA BLD POS QL NAA+NON-PROBE: NOT DETECTED
EKG ATRIAL RATE: 79 BPM
EKG DIAGNOSIS: NORMAL
EKG P-R INTERVAL: 248 MS
EKG Q-T INTERVAL: 382 MS
EKG QRS DURATION: 110 MS
EKG QTC CALCULATION (BAZETT): 438 MS
EKG R AXIS: -40 DEGREES
EKG T AXIS: 14 DEGREES
EKG VENTRICULAR RATE: 79 BPM
ENTEROBACTERALES DNA BLD POS NAA+N-PRB: DETECTED
EOSINOPHIL # BLD: 0 K/UL (ref 0–0.4)
EOSINOPHIL NFR BLD: 0 % (ref 0–7)
ERYTHROCYTE [DISTWIDTH] IN BLOOD BY AUTOMATED COUNT: 17.1 % (ref 11.5–14.5)
FLUAV SUBTYP SPEC NAA+PROBE: NOT DETECTED
FLUBV RNA SPEC QL NAA+PROBE: NOT DETECTED
GLOBULIN SER CALC-MCNC: 3.5 G/DL (ref 2–4)
GLUCOSE SERPL-MCNC: 96 MG/DL (ref 65–100)
GP B STREP DNA BLD POS QL NAA+NON-PROBE: NOT DETECTED
HADV DNA SPEC QL NAA+PROBE: NOT DETECTED
HAEM INFLU DNA BLD POS QL NAA+NON-PROBE: NOT DETECTED
HCOV 229E RNA SPEC QL NAA+PROBE: NOT DETECTED
HCOV HKU1 RNA SPEC QL NAA+PROBE: NOT DETECTED
HCOV NL63 RNA SPEC QL NAA+PROBE: NOT DETECTED
HCOV OC43 RNA SPEC QL NAA+PROBE: NOT DETECTED
HCT VFR BLD AUTO: 24.1 % (ref 36.6–50.3)
HGB BLD-MCNC: 8.3 G/DL (ref 12.1–17)
HMPV RNA SPEC QL NAA+PROBE: NOT DETECTED
HPIV1 RNA SPEC QL NAA+PROBE: NOT DETECTED
HPIV2 RNA SPEC QL NAA+PROBE: NOT DETECTED
HPIV3 RNA SPEC QL NAA+PROBE: NOT DETECTED
HPIV4 RNA SPEC QL NAA+PROBE: NOT DETECTED
IMM GRANULOCYTES # BLD AUTO: 0.4 K/UL (ref 0–0.04)
IMM GRANULOCYTES NFR BLD AUTO: 2 % (ref 0–0.5)
K OXYTOCA DNA BLD POS QL NAA+NON-PROBE: DETECTED
KLEBSIELLA SP DNA BLD POS QL NAA+NON-PRB: NOT DETECTED
KLEBSIELLA SP DNA BLD POS QL NAA+NON-PRB: NOT DETECTED
L MONOCYTOG DNA BLD POS QL NAA+NON-PROBE: NOT DETECTED
LACTATE BLD-SCNC: 1.22 MMOL/L (ref 0.4–2)
LYMPHOCYTES # BLD: 0.9 K/UL (ref 0.8–3.5)
LYMPHOCYTES NFR BLD: 4 % (ref 12–49)
M PNEUMO DNA SPEC QL NAA+PROBE: NOT DETECTED
MCH RBC QN AUTO: 33.9 PG (ref 26–34)
MCHC RBC AUTO-ENTMCNC: 34.4 G/DL (ref 30–36.5)
MCV RBC AUTO: 98.4 FL (ref 80–99)
MONOCYTES # BLD: 1.3 K/UL (ref 0–1)
MONOCYTES NFR BLD: 6 % (ref 5–13)
N MEN DNA BLD POS QL NAA+NON-PROBE: NOT DETECTED
NEUTS SEG # BLD: 18.5 K/UL (ref 1.8–8)
NEUTS SEG NFR BLD: 88 % (ref 32–75)
NRBC # BLD: 0 K/UL (ref 0–0.01)
NRBC BLD-RTO: 0 PER 100 WBC
P AERUGINOSA DNA BLD POS NAA+NON-PROBE: NOT DETECTED
PLATELET # BLD AUTO: 226 K/UL (ref 150–400)
PMV BLD AUTO: 10.8 FL (ref 8.9–12.9)
POTASSIUM SERPL-SCNC: 4 MMOL/L (ref 3.5–5.1)
PROT SERPL-MCNC: 5.2 G/DL (ref 6.4–8.2)
PROTEUS SP DNA BLD POS QL NAA+NON-PROBE: NOT DETECTED
RBC # BLD AUTO: 2.45 M/UL (ref 4.1–5.7)
RESISTANT GENE NDM BY PCR: NOT DETECTED
RESISTANT GENE TARGETS: ABNORMAL
RSV RNA SPEC QL NAA+PROBE: NOT DETECTED
RV+EV RNA SPEC QL NAA+PROBE: NOT DETECTED
S AUREUS DNA BLD POS QL NAA+NON-PROBE: NOT DETECTED
S AUREUS+CONS DNA BLD POS NAA+NON-PROBE: NOT DETECTED
S EPIDERMIDIS DNA BLD POS QL NAA+NON-PRB: NOT DETECTED
S LUGDUNENSIS DNA BLD POS QL NAA+NON-PRB: NOT DETECTED
S MALTOPHILIA DNA BLD POS QL NAA+NON-PRB: NOT DETECTED
S MARCESCENS DNA BLD POS NAA+NON-PROBE: NOT DETECTED
S PNEUM DNA BLD POS QL NAA+NON-PROBE: NOT DETECTED
S PYO DNA BLD POS QL NAA+NON-PROBE: NOT DETECTED
SALMONELLA DNA BLD POS QL NAA+NON-PROBE: NOT DETECTED
SARS-COV-2 RNA RESP QL NAA+PROBE: NOT DETECTED
SODIUM SERPL-SCNC: 135 MMOL/L (ref 136–145)
STREPTOCOCCUS DNA BLD POS NAA+NON-PROBE: NOT DETECTED
WBC # BLD AUTO: 21.1 K/UL (ref 4.1–11.1)

## 2024-01-19 PROCEDURE — 36415 COLL VENOUS BLD VENIPUNCTURE: CPT

## 2024-01-19 PROCEDURE — 71260 CT THORAX DX C+: CPT

## 2024-01-19 PROCEDURE — 2709999900 HC NON-CHARGEABLE SUPPLY: Performed by: INTERNAL MEDICINE

## 2024-01-19 PROCEDURE — 96367 TX/PROPH/DG ADDL SEQ IV INF: CPT

## 2024-01-19 PROCEDURE — 2580000003 HC RX 258: Performed by: STUDENT IN AN ORGANIZED HEALTH CARE EDUCATION/TRAINING PROGRAM

## 2024-01-19 PROCEDURE — 3600000002 HC SURGERY LEVEL 2 BASE: Performed by: INTERNAL MEDICINE

## 2024-01-19 PROCEDURE — 2580000003 HC RX 258: Performed by: INTERNAL MEDICINE

## 2024-01-19 PROCEDURE — P9047 ALBUMIN (HUMAN), 25%, 50ML: HCPCS | Performed by: EMERGENCY MEDICINE

## 2024-01-19 PROCEDURE — 96365 THER/PROPH/DIAG IV INF INIT: CPT

## 2024-01-19 PROCEDURE — 3700000000 HC ANESTHESIA ATTENDED CARE: Performed by: INTERNAL MEDICINE

## 2024-01-19 PROCEDURE — 83605 ASSAY OF LACTIC ACID: CPT

## 2024-01-19 PROCEDURE — 6360000004 HC RX CONTRAST MEDICATION: Performed by: RADIOLOGY

## 2024-01-19 PROCEDURE — 80076 HEPATIC FUNCTION PANEL: CPT

## 2024-01-19 PROCEDURE — 97166 OT EVAL MOD COMPLEX 45 MIN: CPT | Performed by: OCCUPATIONAL THERAPIST

## 2024-01-19 PROCEDURE — 3700000001 HC ADD 15 MINUTES (ANESTHESIA): Performed by: INTERNAL MEDICINE

## 2024-01-19 PROCEDURE — 6360000002 HC RX W HCPCS: Performed by: STUDENT IN AN ORGANIZED HEALTH CARE EDUCATION/TRAINING PROGRAM

## 2024-01-19 PROCEDURE — 97535 SELF CARE MNGMENT TRAINING: CPT | Performed by: OCCUPATIONAL THERAPIST

## 2024-01-19 PROCEDURE — 2580000003 HC RX 258: Performed by: NURSE ANESTHETIST, CERTIFIED REGISTERED

## 2024-01-19 PROCEDURE — 6370000000 HC RX 637 (ALT 250 FOR IP): Performed by: NURSE PRACTITIONER

## 2024-01-19 PROCEDURE — 80048 BASIC METABOLIC PNL TOTAL CA: CPT

## 2024-01-19 PROCEDURE — 2060000000 HC ICU INTERMEDIATE R&B

## 2024-01-19 PROCEDURE — 97530 THERAPEUTIC ACTIVITIES: CPT

## 2024-01-19 PROCEDURE — 6370000000 HC RX 637 (ALT 250 FOR IP): Performed by: EMERGENCY MEDICINE

## 2024-01-19 PROCEDURE — 2580000003 HC RX 258: Performed by: EMERGENCY MEDICINE

## 2024-01-19 PROCEDURE — 2500000003 HC RX 250 WO HCPCS: Performed by: NURSE ANESTHETIST, CERTIFIED REGISTERED

## 2024-01-19 PROCEDURE — 97162 PT EVAL MOD COMPLEX 30 MIN: CPT

## 2024-01-19 PROCEDURE — 0F793ZZ DILATION OF COMMON BILE DUCT, PERCUTANEOUS APPROACH: ICD-10-PCS | Performed by: INTERNAL MEDICINE

## 2024-01-19 PROCEDURE — 99223 1ST HOSP IP/OBS HIGH 75: CPT | Performed by: FAMILY MEDICINE

## 2024-01-19 PROCEDURE — 7100000000 HC PACU RECOVERY - FIRST 15 MIN: Performed by: INTERNAL MEDICINE

## 2024-01-19 PROCEDURE — 6360000002 HC RX W HCPCS: Performed by: NURSE ANESTHETIST, CERTIFIED REGISTERED

## 2024-01-19 PROCEDURE — 74177 CT ABD & PELVIS W/CONTRAST: CPT

## 2024-01-19 PROCEDURE — 6370000000 HC RX 637 (ALT 250 FOR IP): Performed by: STUDENT IN AN ORGANIZED HEALTH CARE EDUCATION/TRAINING PROGRAM

## 2024-01-19 PROCEDURE — 0FPB8DZ REMOVAL OF INTRALUMINAL DEVICE FROM HEPATOBILIARY DUCT, VIA NATURAL OR ARTIFICIAL OPENING ENDOSCOPIC: ICD-10-PCS | Performed by: INTERNAL MEDICINE

## 2024-01-19 PROCEDURE — 6370000000 HC RX 637 (ALT 250 FOR IP): Performed by: INTERNAL MEDICINE

## 2024-01-19 PROCEDURE — 7100000001 HC PACU RECOVERY - ADDTL 15 MIN: Performed by: INTERNAL MEDICINE

## 2024-01-19 PROCEDURE — 0F9930Z DRAINAGE OF COMMON BILE DUCT WITH DRAINAGE DEVICE, PERCUTANEOUS APPROACH: ICD-10-PCS | Performed by: INTERNAL MEDICINE

## 2024-01-19 PROCEDURE — 85025 COMPLETE CBC W/AUTO DIFF WBC: CPT

## 2024-01-19 PROCEDURE — BF101ZZ FLUOROSCOPY OF BILE DUCTS USING LOW OSMOLAR CONTRAST: ICD-10-PCS | Performed by: INTERNAL MEDICINE

## 2024-01-19 PROCEDURE — 6360000002 HC RX W HCPCS: Performed by: EMERGENCY MEDICINE

## 2024-01-19 PROCEDURE — 3600000012 HC SURGERY LEVEL 2 ADDTL 15MIN: Performed by: INTERNAL MEDICINE

## 2024-01-19 RX ORDER — GAUZE BANDAGE 2" X 2"
100 BANDAGE TOPICAL DAILY
Status: DISCONTINUED | OUTPATIENT
Start: 2024-01-19 | End: 2024-01-26 | Stop reason: HOSPADM

## 2024-01-19 RX ORDER — LATANOPROST 50 UG/ML
1 SOLUTION/ DROPS OPHTHALMIC NIGHTLY
COMMUNITY

## 2024-01-19 RX ORDER — FENTANYL CITRATE 50 UG/ML
25 INJECTION, SOLUTION INTRAMUSCULAR; INTRAVENOUS
Status: DISCONTINUED | OUTPATIENT
Start: 2024-01-19 | End: 2024-01-26 | Stop reason: HOSPADM

## 2024-01-19 RX ORDER — SODIUM CHLORIDE 0.9 % (FLUSH) 0.9 %
5-40 SYRINGE (ML) INJECTION EVERY 12 HOURS SCHEDULED
OUTPATIENT
Start: 2024-01-19

## 2024-01-19 RX ORDER — SODIUM CHLORIDE 9 MG/ML
INJECTION, SOLUTION INTRAVENOUS PRN
Status: DISCONTINUED | OUTPATIENT
Start: 2024-01-19 | End: 2024-01-26 | Stop reason: HOSPADM

## 2024-01-19 RX ORDER — SODIUM CHLORIDE 0.9 % (FLUSH) 0.9 %
5-40 SYRINGE (ML) INJECTION PRN
OUTPATIENT
Start: 2024-01-19

## 2024-01-19 RX ORDER — LANOLIN ALCOHOL/MO/W.PET/CERES
3 CREAM (GRAM) TOPICAL NIGHTLY PRN
Status: DISCONTINUED | OUTPATIENT
Start: 2024-01-19 | End: 2024-01-26 | Stop reason: HOSPADM

## 2024-01-19 RX ORDER — CHLORAL HYDRATE 500 MG
CAPSULE ORAL DAILY
COMMUNITY

## 2024-01-19 RX ORDER — CYANOCOBALAMIN/FOLIC AC/VIT B6 100-0.8-5
2 TABLET ORAL DAILY
COMMUNITY

## 2024-01-19 RX ORDER — SODIUM CHLORIDE 0.9 % (FLUSH) 0.9 %
5-40 SYRINGE (ML) INJECTION PRN
Status: DISCONTINUED | OUTPATIENT
Start: 2024-01-19 | End: 2024-01-26 | Stop reason: HOSPADM

## 2024-01-19 RX ORDER — MIDODRINE HYDROCHLORIDE 5 MG/1
2.5 TABLET ORAL ONCE
Status: COMPLETED | OUTPATIENT
Start: 2024-01-19 | End: 2024-01-19

## 2024-01-19 RX ORDER — ACETAMINOPHEN 325 MG/1
650 TABLET ORAL EVERY 6 HOURS PRN
Status: DISCONTINUED | OUTPATIENT
Start: 2024-01-19 | End: 2024-01-26 | Stop reason: HOSPADM

## 2024-01-19 RX ORDER — POLYETHYLENE GLYCOL 3350 17 G/17G
17 POWDER, FOR SOLUTION ORAL DAILY PRN
Status: DISCONTINUED | OUTPATIENT
Start: 2024-01-19 | End: 2024-01-26 | Stop reason: HOSPADM

## 2024-01-19 RX ORDER — SODIUM CHLORIDE 0.9 % (FLUSH) 0.9 %
5-40 SYRINGE (ML) INJECTION EVERY 12 HOURS SCHEDULED
Status: CANCELLED | OUTPATIENT
Start: 2024-01-19

## 2024-01-19 RX ORDER — ACETAMINOPHEN 650 MG/1
650 SUPPOSITORY RECTAL EVERY 6 HOURS PRN
Status: DISCONTINUED | OUTPATIENT
Start: 2024-01-19 | End: 2024-01-26 | Stop reason: HOSPADM

## 2024-01-19 RX ORDER — FLUCONAZOLE 100 MG/1
100 TABLET ORAL DAILY
Status: DISCONTINUED | OUTPATIENT
Start: 2024-01-19 | End: 2024-01-26 | Stop reason: HOSPADM

## 2024-01-19 RX ORDER — DEXTROSE AND SODIUM CHLORIDE 5; .45 G/100ML; G/100ML
INJECTION, SOLUTION INTRAVENOUS CONTINUOUS
Status: ACTIVE | OUTPATIENT
Start: 2024-01-19 | End: 2024-01-21

## 2024-01-19 RX ORDER — FOLIC ACID 1 MG/1
1 TABLET ORAL DAILY
Status: DISCONTINUED | OUTPATIENT
Start: 2024-01-19 | End: 2024-01-26 | Stop reason: HOSPADM

## 2024-01-19 RX ORDER — PROPOFOL 10 MG/ML
INJECTION, EMULSION INTRAVENOUS PRN
Status: DISCONTINUED | OUTPATIENT
Start: 2024-01-19 | End: 2024-01-19 | Stop reason: SDUPTHER

## 2024-01-19 RX ORDER — ALBUMIN (HUMAN) 12.5 G/50ML
25 SOLUTION INTRAVENOUS EVERY 6 HOURS PRN
Status: DISCONTINUED | OUTPATIENT
Start: 2024-01-19 | End: 2024-01-26 | Stop reason: HOSPADM

## 2024-01-19 RX ORDER — ONDANSETRON 2 MG/ML
4 INJECTION INTRAMUSCULAR; INTRAVENOUS EVERY 6 HOURS PRN
Status: DISCONTINUED | OUTPATIENT
Start: 2024-01-19 | End: 2024-01-26 | Stop reason: HOSPADM

## 2024-01-19 RX ORDER — LATANOPROST 50 UG/ML
1 SOLUTION/ DROPS OPHTHALMIC NIGHTLY
Status: DISCONTINUED | OUTPATIENT
Start: 2024-01-19 | End: 2024-01-26 | Stop reason: HOSPADM

## 2024-01-19 RX ORDER — MIDODRINE HYDROCHLORIDE 5 MG/1
5 TABLET ORAL
Status: DISCONTINUED | OUTPATIENT
Start: 2024-01-19 | End: 2024-01-26 | Stop reason: HOSPADM

## 2024-01-19 RX ORDER — ONDANSETRON 4 MG/1
4 TABLET, ORALLY DISINTEGRATING ORAL EVERY 8 HOURS PRN
Status: DISCONTINUED | OUTPATIENT
Start: 2024-01-19 | End: 2024-01-26 | Stop reason: HOSPADM

## 2024-01-19 RX ORDER — SODIUM CHLORIDE, SODIUM LACTATE, POTASSIUM CHLORIDE, CALCIUM CHLORIDE 600; 310; 30; 20 MG/100ML; MG/100ML; MG/100ML; MG/100ML
INJECTION, SOLUTION INTRAVENOUS CONTINUOUS PRN
Status: DISCONTINUED | OUTPATIENT
Start: 2024-01-19 | End: 2024-01-19 | Stop reason: SDUPTHER

## 2024-01-19 RX ORDER — PHENYLEPHRINE HCL IN 0.9% NACL 0.4MG/10ML
SYRINGE (ML) INTRAVENOUS PRN
Status: DISCONTINUED | OUTPATIENT
Start: 2024-01-19 | End: 2024-01-19 | Stop reason: SDUPTHER

## 2024-01-19 RX ORDER — ALBUMIN (HUMAN) 12.5 G/50ML
25 SOLUTION INTRAVENOUS ONCE
Status: COMPLETED | OUTPATIENT
Start: 2024-01-19 | End: 2024-01-19

## 2024-01-19 RX ORDER — SODIUM CHLORIDE 0.9 % (FLUSH) 0.9 %
5-40 SYRINGE (ML) INJECTION EVERY 12 HOURS SCHEDULED
Status: DISCONTINUED | OUTPATIENT
Start: 2024-01-19 | End: 2024-01-26 | Stop reason: HOSPADM

## 2024-01-19 RX ORDER — 0.9 % SODIUM CHLORIDE 0.9 %
1000 INTRAVENOUS SOLUTION INTRAVENOUS ONCE
Status: COMPLETED | OUTPATIENT
Start: 2024-01-19 | End: 2024-01-19

## 2024-01-19 RX ORDER — SODIUM CHLORIDE 0.9 % (FLUSH) 0.9 %
5-40 SYRINGE (ML) INJECTION PRN
Status: CANCELLED | OUTPATIENT
Start: 2024-01-19

## 2024-01-19 RX ORDER — SODIUM CHLORIDE 9 MG/ML
25 INJECTION, SOLUTION INTRAVENOUS PRN
OUTPATIENT
Start: 2024-01-19

## 2024-01-19 RX ORDER — M-VIT,TX,IRON,MINS/CALC/FOLIC 27MG-0.4MG
1 TABLET ORAL DAILY
Status: DISCONTINUED | OUTPATIENT
Start: 2024-01-19 | End: 2024-01-26 | Stop reason: HOSPADM

## 2024-01-19 RX ORDER — LIDOCAINE HYDROCHLORIDE 20 MG/ML
INJECTION, SOLUTION EPIDURAL; INFILTRATION; INTRACAUDAL; PERINEURAL PRN
Status: DISCONTINUED | OUTPATIENT
Start: 2024-01-19 | End: 2024-01-19 | Stop reason: SDUPTHER

## 2024-01-19 RX ORDER — MIDODRINE HYDROCHLORIDE 5 MG/1
2.5 TABLET ORAL
Status: COMPLETED | OUTPATIENT
Start: 2024-01-19 | End: 2024-01-19

## 2024-01-19 RX ORDER — SODIUM CHLORIDE, SODIUM LACTATE, POTASSIUM CHLORIDE, CALCIUM CHLORIDE 600; 310; 30; 20 MG/100ML; MG/100ML; MG/100ML; MG/100ML
INJECTION, SOLUTION INTRAVENOUS CONTINUOUS
Status: DISCONTINUED | OUTPATIENT
Start: 2024-01-19 | End: 2024-01-19 | Stop reason: HOSPADM

## 2024-01-19 RX ORDER — MIDODRINE HYDROCHLORIDE 2.5 MG/1
2.5 TABLET ORAL DAILY PRN
Status: ON HOLD | COMMUNITY
End: 2024-01-26

## 2024-01-19 RX ORDER — SODIUM CHLORIDE 9 MG/ML
25 INJECTION, SOLUTION INTRAVENOUS PRN
Status: CANCELLED | OUTPATIENT
Start: 2024-01-19

## 2024-01-19 RX ADMIN — PIPERACILLIN AND TAZOBACTAM 4500 MG: 4; .5 INJECTION, POWDER, LYOPHILIZED, FOR SOLUTION INTRAVENOUS at 00:56

## 2024-01-19 RX ADMIN — DEXTROSE AND SODIUM CHLORIDE: 5; 450 INJECTION, SOLUTION INTRAVENOUS at 21:55

## 2024-01-19 RX ADMIN — SODIUM CHLORIDE, PRESERVATIVE FREE 10 ML: 5 INJECTION INTRAVENOUS at 09:05

## 2024-01-19 RX ADMIN — ACETAMINOPHEN 650 MG: 325 TABLET ORAL at 18:33

## 2024-01-19 RX ADMIN — ALBUMIN (HUMAN) 25 G: 0.25 INJECTION, SOLUTION INTRAVENOUS at 01:49

## 2024-01-19 RX ADMIN — MIDODRINE HYDROCHLORIDE 5 MG: 5 TABLET ORAL at 18:41

## 2024-01-19 RX ADMIN — VANCOMYCIN HYDROCHLORIDE 750 MG: 750 INJECTION, POWDER, LYOPHILIZED, FOR SOLUTION INTRAVENOUS at 21:46

## 2024-01-19 RX ADMIN — Medication 1750 MG: at 02:22

## 2024-01-19 RX ADMIN — SODIUM CHLORIDE 1000 ML: 9 INJECTION, SOLUTION INTRAVENOUS at 00:44

## 2024-01-19 RX ADMIN — SODIUM CHLORIDE, POTASSIUM CHLORIDE, SODIUM LACTATE AND CALCIUM CHLORIDE: 600; 310; 30; 20 INJECTION, SOLUTION INTRAVENOUS at 14:45

## 2024-01-19 RX ADMIN — SODIUM CHLORIDE, SODIUM LACTATE, POTASSIUM CHLORIDE, AND CALCIUM CHLORIDE: 600; 310; 30; 20 INJECTION, SOLUTION INTRAVENOUS at 15:50

## 2024-01-19 RX ADMIN — MIDODRINE HYDROCHLORIDE 2.5 MG: 5 TABLET ORAL at 00:44

## 2024-01-19 RX ADMIN — PROPOFOL 30 MG: 10 INJECTION, EMULSION INTRAVENOUS at 15:58

## 2024-01-19 RX ADMIN — MIDODRINE HYDROCHLORIDE 2.5 MG: 5 TABLET ORAL at 01:49

## 2024-01-19 RX ADMIN — LIDOCAINE HYDROCHLORIDE 100 MG: 20 INJECTION, SOLUTION EPIDURAL; INFILTRATION; INTRACAUDAL; PERINEURAL at 15:58

## 2024-01-19 RX ADMIN — PROPOFOL 50 MCG/KG/MIN: 10 INJECTION, EMULSION INTRAVENOUS at 15:59

## 2024-01-19 RX ADMIN — PIPERACILLIN AND TAZOBACTAM 3375 MG: 3; .375 INJECTION, POWDER, FOR SOLUTION INTRAVENOUS at 10:42

## 2024-01-19 RX ADMIN — CARBIDOPA AND LEVODOPA 1 TABLET: 25; 100 TABLET ORAL at 18:49

## 2024-01-19 RX ADMIN — IOPAMIDOL 100 ML: 755 INJECTION, SOLUTION INTRAVENOUS at 01:12

## 2024-01-19 RX ADMIN — Medication 200 MCG: at 16:05

## 2024-01-19 RX ADMIN — Medication 200 MCG: at 16:12

## 2024-01-19 RX ADMIN — PIPERACILLIN AND TAZOBACTAM 3375 MG: 3; .375 INJECTION, POWDER, FOR SOLUTION INTRAVENOUS at 18:48

## 2024-01-19 RX ADMIN — Medication 1 AMPULE: at 21:32

## 2024-01-19 RX ADMIN — SODIUM CHLORIDE, PRESERVATIVE FREE 10 ML: 5 INJECTION INTRAVENOUS at 21:30

## 2024-01-19 RX ADMIN — DEXTROSE AND SODIUM CHLORIDE: 5; 450 INJECTION, SOLUTION INTRAVENOUS at 05:30

## 2024-01-19 RX ADMIN — FLUCONAZOLE 100 MG: 100 TABLET ORAL at 18:45

## 2024-01-19 ASSESSMENT — PAIN SCALES - GENERAL
PAINLEVEL_OUTOF10: 0
PAINLEVEL_OUTOF10: 2

## 2024-01-19 ASSESSMENT — PAIN - FUNCTIONAL ASSESSMENT: PAIN_FUNCTIONAL_ASSESSMENT: 0-10

## 2024-01-19 ASSESSMENT — PAIN DESCRIPTION - LOCATION: LOCATION: GENERALIZED

## 2024-01-19 NOTE — FLOWSHEET NOTE
01/19/24 1715   Handoff   Communication Given Transfer Handoff   Handoff Given To Sixto LEAVITT   Handoff Received From Florencio LEAVITT   Handoff Communication Telephone;At bedside   Time Handoff Given 1723     Patient son updated on status and new room number.   Patient transferred on NC 3L. Monitor/ RN/   Patient belongings present.

## 2024-01-19 NOTE — ANESTHESIA PRE PROCEDURE
Department of Anesthesiology  Preprocedure Note       Name:  Derick Jane Jr   Age:  79 y.o.  :  1945                                          MRN:  [id number]         Date:  2024      Surgeon: Surgeon(s):  Josh Malhotra MD    Procedure: Procedure(s):  EGD ESOPHAGOGASTRODUODENOSCOPY    Medications prior to admission:   Prior to Admission medications    Medication Sig Start Date End Date Taking? Authorizing Provider   apixaban (ELIQUIS) 5 MG TABS tablet Take by mouth 2 times daily   Yes Anuj Heard MD   latanoprost (XALATAN) 0.005 % ophthalmic solution Place 1 drop into both eyes nightly   Yes ProviderAnuj MD   midodrine (PROAMATINE) 2.5 MG tablet Take 1 tablet by mouth daily as needed (hypotension)   Yes ProviderAnuj MD   Coenzyme Q10 (COQ-10) 100 MG CAPS Take 200 mg by mouth daily   Yes Provider, MD Anuj   Folic Acid-Vit B6-Vit B12 (HOMOCYSTEINE FORMULA) 0.8-50-0.1 MG TABS Take 2 tablets by mouth daily   Yes ProviderAnuj MD   Ginkgo Biloba (GINKOBA PO) Take 320 mg by mouth daily   Yes Provider, MD Anuj   GLUTATHIONE PO Take 200 mg by mouth daily   Yes ProviderAnuj MD   Omega-3 Fatty Acids (FISH OIL) 1000 MG capsule Take by mouth daily   Yes ProviderAnuj MD   Resveratrol 100 MG CAPS Take by mouth   Yes ProviderAnuj MD   carbidopa-levodopa (SINEMET)  MG per tablet Take 1 tablet by mouth every 4 hours 22  Yes Automatic Reconciliation, Ar   vitamin D3 (CHOLECALCIFEROL) 125 MCG (5000 UT) TABS tablet Take by mouth Twice a Week   Yes Automatic Reconciliation, Ar   cyanocobalamin 500 MCG tablet Take by mouth daily   Yes Automatic Reconciliation, Ar       Current medications:    Current Facility-Administered Medications   Medication Dose Route Frequency Provider Last Rate Last Admin    [Held by provider] apixaban (ELIQUIS) tablet 5 mg  5 mg Oral BID Gavino Peña DO        carbidopa-levodopa (SINEMET)

## 2024-01-19 NOTE — H&P
Hospitalist Admission Note    NAME:  Derick Jane Jr   :  1945   MRN:  [id number]     Date/Time:  2024 4:16 AM    Patient PCP: Brian Nur MD    ______________________________________________________________________  Given the patient's current clinical presentation, I have a high level of concern for decompensation if discharged from the emergency department.  Complex decision making was performed, which includes reviewing the patient's available past medical records, laboratory results, and x-ray films.       My assessment of this patient's clinical condition and my plan of care is as follows.    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  Anasarca secondary to above  Precipitous functional decline  Trend hepatic function panel  Gastroenterology consulted, greatly appreciate their expertise  Oncology consulted, greatly appreciate their expertise  Palliative care consulted, greatly appreciate their expertise    Summary of oncologic workup from [facility]:  Admitted -  On admission , ALT 47, alkaline phosphatase 1129, total bilirubin 18 (direct bilirubin 13) lipase WNL  Ultrasound and CT abdomen pelvis demonstrating intrahepatic biliary ductal dilatation with abrupt tapering of common hepatic duct worrisome for extrinsic compression  MRCP redemonstrated intrahepatic biliary ductal dilatation with concern for

## 2024-01-19 NOTE — PLAN OF CARE
amb 15' and 45' with a rolling walker with SBA/CGA. He is now min A to roll, max A of 2 to complete coming to sit at edge of bed and mod to max A of 2 to come to stand. Today was able to side step x3 steps toward HOB with mod A -max A of 2. He is exhibiting significant posterior lean in sitting and standing. He also exhibits significant R cervical tightness and tends to rest with neck in slight R cervical flexion.  Pt stable on room air. Did show orthostatic drop in BP upon standing, see below.     01/19/24 0930 01/19/24 0945 01/19/24 0953   Vital Signs   Pulse  --   --   --    /72 113/75 (!) 82/59   MAP (Calculated) 89 88 67   Patient Position Supine Sitting Standing   Oxygen Therapy   SpO2  --   --   --    O2 Device  --   --   --       01/19/24 0958   Vital Signs   Pulse 60   BP (!) 151/83   MAP (Calculated) 106   Patient Position Supine   Oxygen Therapy   SpO2 95 %   O2 Device None (Room air)       Based on the impairments listed above and due to this significant decline, his medical complexity, history of Parkinson's, noted orthostatic hypotension during eval and pt's good ability to follow commands and respond to therapeutic interventions, would recommend the medical oversight, consistency and intensity of inpatient rehab.          Patient will benefit from skilled intervention to address the above impairments.    Functional Outcome Measure:  The patient scored 11/24 on the [facility] outcome measure which is indicative of need for continued therapy at d/c.           PLAN :  Recommendations and Planned Interventions:   bed mobility training, transfer training, gait training, therapeutic exercises, neuromuscular re-education, patient and family training/education, and therapeutic activities    Frequency/Duration: Patient will be followed by physical therapy to address goals, PT Plan of Care: 4 times/week to address goals.    Recommendation for discharge: (in order for the patient to meet his/her long term  functional (intermittent tremor)    Range Of Motion:  AROM: Generally decreased, functional  PROM: Generally decreased, functional    Functional Mobility:  Bed Mobility:     Bed Mobility Training  Bed Mobility Training: Yes  Interventions: Manual cues;Safety awareness training;Tactile cues;Verbal cues  Rolling: Minimum assistance (with use of rails)  Supine to Sit: Maximum assistance;Assist X2  Sit to Supine: Assist X2;Maximum assistance;Total assistance  Scooting: Maximum assistance  Transfers:     Transfer Training  Transfer Training: Yes  Interventions: Manual cues;Safety awareness training;Tactile cues;Verbal cues  Sit to Stand: Moderate assistance;Maximum assistance;Assist X2  Stand to Sit: Moderate assistance;Maximum assistance;Assist X2  Bed to Chair: Moderate assistance;Maximum assistance;Assist X2 (side step toward HOB)  Balance:               Balance  Sitting: Impaired  Sitting - Static: Fair (occasional);Poor (constant support) (post lean)  Sitting - Dynamic: Poor (constant support) (post lean)  Standing: Impaired  Standing - Static: Constant support;Poor (post lean)  Standing - Dynamic: Constant support;Poor (posterior lean)                                                                                                                                                                                                                                       [facility] AM-PAC®      Basic Mobility Inpatient Short Form (6-Clicks) Version 2  How much HELP from another person do you currently need... (If the patient hasn't done an activity recently, how much help from another person do you think they would need if they tried?) Total A Lot A Little None   1.  Turning from your back to your side while in a flat bed without using bedrails? []  1 []  2 [x]  3  []  4   2.  Moving from lying on your back to sitting on the side of a flat bed without using bedrails? []  1 [x]  2 []  3  []  4   3.  Moving to and from

## 2024-01-19 NOTE — PROGRESS NOTES
Vitals signs during therapy Evaluation this date.   01/19/24 0930 01/19/24 0945 01/19/24 0953   Vital Signs   Pulse  --   --   --    /72 113/75 (!) 82/59   MAP (Calculated) 89 88 67   Patient Position Supine Sitting Standing   Oxygen Therapy   SpO2  --   --   --    O2 Device  --   --   --       01/19/24 0958   Vital Signs   Pulse 60   BP (!) 151/83   MAP (Calculated) 106   Patient Position Supine   Oxygen Therapy   SpO2 95 %   O2 Device None (Room air)

## 2024-01-19 NOTE — ED NOTES
Bedside and Verbal shift change report given to DAGMAR Rincon (oncoming nurse) by DAGMAR Vergara (offgoing nurse). Report included the following information Nurse Handoff Report, ED Encounter Summary, ED SBAR, Adult Overview, Intake/Output, MAR, Recent Results, and Med Rec Status.

## 2024-01-19 NOTE — PROGRESS NOTES
Physical Therapy    Pt seen for eval. Pt is functioning well below his level of function stated in chart when he was at [facility] 1/8/24 when he was supervision for bed mobility, SBA for transfers with walker and was able to amb 15' and 45' with a rolling walker with SBA/CGA. He is now min A to roll, max A of 2 to complete coming to sit at edge of bed and mod to max A of 2 to come to stand. Was able to side step x3 steps toward HOB with mod A -max A of 2. He is exhibiting significant posterior lean in sitting and standing.  Due to this significant decline, his medical complexity, history of Parkinson's, noted orthostatic hypotension during eval and pt's good ability to follow commands and respond to therapeutic interventions, would recommend the medical oversight, consistency and intensity of inpatient rehab.   Full report to follow.    eKila Conteh PT

## 2024-01-19 NOTE — ED NOTES
Report given to OR for scheduled EGD at 1500.  Will plan pick pt up at 1400 if not transferred to room by that time.

## 2024-01-19 NOTE — PERIOP NOTE
Patient is alert and oriented. At times speech is difficult to understand. Per family this is baseline due to parkinsons dz. Patient able to signs own consents, signature varies from one to another depending on tremors in upper extremities.   Patient denies pain. Skin jaundice including sclera. Voiding via male purewick device . Dark brown/tea colored.     #18 left a/c patent . No redness, tenderness at site. LR infusing without difficulty

## 2024-01-19 NOTE — PLAN OF CARE
requiring assistance X2 for bed mobility, standing and sidestepping.  Pt is very stiff overall, including R neck and B shoulder areas.  Pt's VS were monitored throughout--he was orthostatic in standing and returned to supine with assist X2.     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  Pt had recently been hospitalized,  and was at home PTA, with a full time caregiver assisting him, and his wife.     Since pt has hx of PD and complex medical condition/history, feel pt would be well suited for an intensive inpatient rehab program where he will participate in  therapies and have his complex medical condition managed.       Based on the impairments listed above recommend inpatient rehab program.    Functional Outcome Measure:  The patient scored 25/100 on the Barthel Index outcome measure which is indicative of dependent adl performance .         PLAN :  Recommendations and Planned Interventions:   self care training, therapeutic activities, functional mobility training, balance training, therapeutic exercise, neuromuscular re-education, endurance activities, patient education, home safety training, and family training/education    Frequency/Duration:  4X/week    Recommendation for discharge: (in order for the patient to meet his/her long term goals): Therapy 3 hours/day 5-7 days/week    Other factors to consider for discharge: high risk for falls, not safe to be alone, concern for safely navigating or managing the home environment, and complex medical history, orthostatic hypotension    IF patient discharges home will need the following DME:  tbd based on pt's progress       SUBJECTIVE:   Patient stated, “she can't help me.”   (pt's wife unable to assist--pt has caregiver)  OBJECTIVE DATA SUMMARY:     Past Medical History:   Diagnosis Date    Mitral valve disorders(424.0) 11/19/2009    4/3/17 followed by PCP no cardiologist    Parkinson disease (HCC)      Prostatism 11/19/2009    Skin cancer      Past Surgical History:   Procedure Laterality Date    APPENDECTOMY  childhood    COLONOSCOPY N/A 2/7/2018    COLONOSCOPY performed by Elver Reich MD at [facility] AMBULATORY OR    COLONOSCOPY            Expanded or extensive additional review of patient history:   Lives With: Spouse, Other (comment) (he states caregivers have been set up but unsure about amount of time)  Type of Home: House  Home Layout: Two level (14 steps to second floor bedroom with hand rail)  Home Access: Stairs to enter with rails              Bathroom Equipment: Commode     Home Equipment: Walker, rolling, Wheelchair-manual  Has the patient had two or more falls in the past year or any fall with injury in the past year?: Yes        Hand Dominance: left     EXAMINATION OF PERFORMANCE DEFICITS:    Cognitive/Behavioral Status:  Orientation  Overall Orientation Status: Within Functional Limits  Orientation Level: Oriented X4  Cognition  Overall Cognitive Status: Exceptions  Arousal/Alertness: Delayed responses to stimuli  Following Commands: Follows one step commands consistently;Follows one step commands with increased time  Attention Span:  (initially cues to keep eyes open)  Memory:  (able to provide PLOF information/history)  Insights: Fully aware of deficits  Initiation: Requires cues for some  Sequencing: Requires cues for some    Skin: appears intact    Edema: none observed     Hearing:    Functional, reports no hearing aids    Vision/Perceptual:    Vision - Basic Assessment  Patient Visual Report: No visual complaint reported. (states that he wears reading glasses)        Perception  Overall Perceptual Status:  (appears intact)    Range of Motion:   AROM: Generally decreased, functional  PROM: Generally decreased, functional      Strength:  Strength: Generally decreased, functional      Coordination:  Coordination: Generally decreased, functional (slowed occasional jerking,)  Coordination:

## 2024-01-19 NOTE — FLOWSHEET NOTE
01/19/24 1610   Handoff   Communication Given Transfer Handoff   Handoff Given To Florencio LEAVITT   Handoff Received From Lulu CR/ Audelia LEAVITT   Handoff Communication Face to Face;At bedside   Time Handoff Given 1610

## 2024-01-19 NOTE — PROGRESS NOTES
Critical Care Note         Brief HPI:  This is a pleasant 79-year-old  male with history significant for Parkinson's, PE on Eliquis and recent hospitalization at [facility] from 12/29/23-1/8/24 being worked up for acute liver injury with concerns for cholangiocarcinoma.  Patient very poor historian and majority information obtained from EMR/medical records.  In short, patient presented to [facility] with chief complaint of progressive weakness, decreased appetite, shortness of breath and hypertension.  Per ER documentation patient's wife apparently found patient to have a multiple episodes of severe shaking, however does have shaking at baseline from Parkinson's but apparently seemed a lot worse.  Upon further questioning with patient he states that he had chills today that got worse and he felt feverish, and stated that he had been getting weaker and has not been able to eat with some shortness of breath.  Patient states that he is referred to oncology at [facility] and his appointment is scheduled for later this month and was recently discharged as stated previous.  Patient also states that he has not been able to take his medications today and questionable on when he has taken his medications last including midodrine.  While in emergency room patient's systolic blood pressure in the 70s to 80s and heart rate in the 60s and appears normal sinus rhythm.  Upon arrival to emergency room full laboratory workup was complete and significant for sodium of 127, blood glucose of 49, albumin 1.9, alkaline phosphatase 911, , bilirubin 15, WBCs 13.5, H&H 10/31, INR 1.3, procalcitonin 3.91, BNP 1960, Ammonia <10 and CT Chest/Abd/Pelvis with findings of small pleural effusions, small amount of ascites, and diffuse body wall edema, biliary duct dilatation, multiple vertebral compression fractures, no urgent acute abnormality in the chest, abdomen, or pelvis.  Critical care was consulted for

## 2024-01-19 NOTE — ANESTHESIA POSTPROCEDURE EVALUATION
Department of Anesthesiology  Postprocedure Note    Patient: Dreick Jane Jr  MRN: [id number]  YOB: 1945  Date of evaluation: 1/19/2024    Procedure Summary       Date: 01/19/24 Room / Location: [facility] MA[facility]  / [facility]    Anesthesia Start: 1550 Anesthesia Stop: 1614    Procedure: EGD ESOPHAGOGASTRODUODENOSCOPY (Upper GI Region) Diagnosis:       Acute cholangitis      (Acute cholangitis [K83.09])    Providers: Josh Malhotra MD Responsible Provider: Reagan Alba MD    Anesthesia Type: MAC ASA Status: 3 - Emergent            Anesthesia Type: MAC    Jose Phase I: Jose Score: 8    Jose Phase II:      Anesthesia Post Evaluation    Patient location during evaluation: PACU  Patient participation: complete - patient participated  Level of consciousness: sleepy but conscious and responsive to verbal stimuli  Airway patency: patent  Nausea & Vomiting: no vomiting and no nausea  Cardiovascular status: blood pressure returned to baseline and hemodynamically stable  Respiratory status: acceptable  Hydration status: stable    No notable events documented.

## 2024-01-19 NOTE — ED PROVIDER NOTES
[facility] EMERGENCY DEPT  EMERGENCY DEPARTMENT ENCOUNTER         Pt Name: Derick Jane Jr  MRN: [id number]  Birthdate 1945  Date of evaluation: 1/18/2024  Provider: Yoly Fish MD   PCP: Brian Nur MD  Note Started: 1:09 AM 1/18/24     CHIEF COMPLAINT       Chief Complaint   Patient presents with    Hypertension     Patient arrives via EMS from home for hypertension and not feeling well x1 day. Per caregiver patient's BP was 231/197, 173/68 with EMS. Patient arrives on 2L for shortness of breath however SPO2 normal with room air while triaged, states no longer feeling short of breath. Recent PE, jaundice xweeks per EMS. VSS, GCS 15        HISTORY OF PRESENT ILLNESS: 1 or more elements      History From: Patient, Patient's Wife, and Patient's Son  HPI Limitations: None     Derick Jane Jr is a 79 y.o. male who presents to the ER from home accompanied by family with progressive weakness, now inability to stand, low appetite, no po intake in 2 days. His wife observed today that he had episodes of severe shakes. History of parkinsons disease with baseline tremor but the shakes seemed a lot worse. Lasted about a minute before resolving. He was awake during these episodes.  Patient was recently hospitalized at [facility] for jaundice and wt loss. Workup showed acute liver injury, cholestatic pattern, conjugated hyperbilirubinemia, imaging noted mass compressing biliary tree, concerning for cholangiocarcinoma. He was referred to oncology at [facility] and has an appointment scheduled for later this month.    During that hospitalization he was also diagnosed incidentally with a small PE. Started on eliquis.  He was also tested for covid and found positive. Completed Remdesivir while in hospital.  Since discharge he has had progressive weakness and appetite loss. Over last few days the wife reports that caring for him at home has been very challenging due to weakness. She also reports more labored breathing although patient

## 2024-01-19 NOTE — PROGRESS NOTES
Overnight admission.  Briefly patient is here with sepsis, hyperbilirubinemia in the setting of possible cholangiocarcinoma, anasarca, hypoglycemia, precipitous functional decline, advanced Parkinson's disease, autonomic instability with hypotension, hyponatremia, pulmonary embolism on Eliquis, recent COVID-19 infection, glaucoma.  Appreciate GI input and plan is for ERCP and removal of the biliary stents that are not functional at this point.  I also touch base with IR Dr. Tavarez and the plan is to hold Eliquis from today and do percutaneous transhepatic biliary drain is on Monday.   Oncology and palliative consult also pending.  DPOA is celso Parry [phone].  Will continue to follow-up with the patient.

## 2024-01-19 NOTE — CONSULTS
Palliative Medicine  Patient Name: Derick Jane Jr  YOB: 1945  MRN: [id number]  Age: 79 y.o.  Gender: male    Date of Initial Consult: 1/18/2024  Date of Service: 1/19/2024  Time: 12:16 PM  Provider: Lidia Lara MD  Hospital Day: 2  Admit Date: 1/18/2024  Referring Provider: Dr. Fish and Dr. Peña       Reasons for Consultation:  Goals of Care    HISTORY OF PRESENT ILLNESS (HPI):   Derick Jane Jr is a 79 y.o. male with Parkinson's disease, recent PE, recent hospitalization at [facility] and found to have suspected cholangiocarcinoma s/p two biliary stents who was admitted on 1/18/2024 from home with a diagnosis of sepsis and hyperbilirubinemia. Per GI he presents with findings compatible with acute cholangitis. He is receiving IV antibiotics. He initially had hypotension, but this has improved. GI is planning to remove the migrated CBD stents today with EGD followed by PTC with IR placed stenting in the next few days after eliquis washout. Oncology has been consulted.    1/19/2024 CT A/P:  IMPRESSION:  1. CT of the chest demonstrates bilateral effusions right greater than left and  bibasilar atelectasis.  2. CT of the abdomen and pelvis demonstrates intrahepatic ductal dilatation  which can be followed to the liver hilum where there is soft tissue density  which may represent hilar cholangiocarcinoma. A biliary stent is distal to the  level of obstruction. Results called to the nurse practitioner and GI.  3. There is small amount of ascites. There is anasarca.  4. Numerous compression fractures thoracic lumbar spine. Comparison with prior  studies would be helpful.    Psychosocial: Patient is  to wife Zulema. He has one son Sohan who is an . Patient has 24/7 caregiver support. 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 Sohan says his dad is high energy and also pushes forward to do even more than is expected of him.    PALLIATIVE DIAGNOSES:   palliative a few weeks ago at [facility]. States he will certainly call us if they need support. Says he knows we \"are going into a comfort versus treatment decision soon.\" He recognizes his dad is very sick and says that at this time his dad wants to try treatments that may help improve his time and quality. If his condition worsens or there are no further treatment options recommended they may consider a more comfort focused approach. Sohan says his dad is high energy and also pushes forward to do even more than is expected of him, so he believes heis dad wants to move forward with current treatment plans as recommended by GI.  Sohan says his dad has an AMD and that he and his mom/patient's wife are co-mPOA. He will bring a copy for our records.  Sohan is aware his dad said he wanted to be DNR and states that is consistent with their previous discussions. Discussed they can consider signing a DDNR form which can travel with him to help protect him after hospitalization.  Sohan has our Palliative number and states he will call us if they have any questions or if we can be of support. I let him know that after today we will return on Monday.  Thank you for asking our team to participate in the care of Derick Jane .  Please call with any palliative questions or concerns.  Palliative Care Team is available via perfect serve or via phone.    Referrals to:   [] Outpatient Palliative Care  [] Home Based Palliative Care  [] Home Based Primary Care  [] Hospice       ADVANCE CARE PLANNING:   [] [facility] Interdisciplinary Team has updated the ACP Navigator with Health Care Decision Maker and Patient Capacity      Primary Decision Maker: Sohan Jane D - Child - [phone]    Secondary Decision Maker: Zulema Jane - Spouse - [phone]       Current Code Status: DNR     Goals of Care:         Please refer to Palliative Medicine ACP notes for further details.    PALLIATIVE ASSESSMENT:      Palliative

## 2024-01-19 NOTE — ED NOTES
Pt sheets, gown and pants saturated in urine. Pt cleaned up with new gown, soiled pants removed, and new sheets with chucks pad. Redness and sheet markings noticed on back. No broken skin. Pt place on male purewik at this time.

## 2024-01-19 NOTE — PROGRESS NOTES
Pharmacy Antimicrobial Kinetic Dosing    Indication for Antimicrobials: sepsis     Current Regimen of Each Antimicrobial:  Zosyn 3.375 gm IV q8h; Start Date ; Day # 1  Vancomycin pharmacy to dose; Start Date ; Day # 1    Goal Level: Vancomycin -600    Date Dose & Interval Measured (mcg/mL) Predicted AUC                       Significant Cultures:    blood: pending    Labs:  Recent Labs     Units 24  2235 24  2046   CREATININE MG/DL  --  0.99   BUN MG/DL  --  31*   PROCAL ng/mL 3.91  --    WBC K/uL  --  13.5*     Temp (24hrs), Av.2 °F (36.8 °C), Min:97.4 °F (36.3 °C), Max:99 °F (37.2 °C)    Conditions for Dosing Consideration: None    Creatinine Clearance (mL/min): Estimated Creatinine Clearance: 61 mL/min (based on SCr of 0.99 mg/dL).     Impression/Plan:   Zosyn 3.375 gm IV q8h per extended infusion beta lactam protocol   Vancomycin 1750 mg loading dose, then 750 mg IV q12h (predicted , predicted trough 14.7mcg/ml)  Antimicrobial stop date tbd     Pharmacy will follow daily and adjust medications as appropriate for renal function and/or serum levels.    Thank you,  Aylin Rashid, [facility]

## 2024-01-19 NOTE — ED NOTES
Assumed care of pt at this time. Pt resting in ER stretcher with no complaints. Pt on monitor x3 with call bell in reach.     0815: Pt resting in ER stretcher and in no distress. Pt remains on monitor x3 with call bell in reach. Hospitalist at bedside at this time.     0915: Pt resting in stretcher with no complaints. Pt remains on monitor x3 with call bell in reach.     1015: Pt resting comfortably in stretcher. Pt on monitor x3 with call bell in reach.     1045: Pt family at bedside     1115: Pt resting in ED stretcher. Pt remains on monitor x3 with call bell in reach. Pt family at bedside.     1230: Pt resting in ED stretcher with no complaints. Pt on monitor x3 with call bell in reach.     1330: Pt resting in ED stretcher and on monitor x3 with call bell in reach.

## 2024-01-19 NOTE — ED NOTES
Patient and provider Casey LUNDBERG discussed at length about code status including but not excluded to CPR/Intubation in event of cardiac arrest. Patient with mental capacity stated he would like to be DNR status, this RN witnessed patient's statement at bedside with Casey LUNDBERG.

## 2024-01-19 NOTE — PROGRESS NOTES
Speech Pathology Contact Note:    Orders received and appreciated for SLP evaluation. Pt currently NPO for EGD this afternoon. SLP will follow up as patient appropriate. Thanks!     Mckenzie Randolph CCC-SLP

## 2024-01-19 NOTE — OP NOTE
[place] Office: [phone]      Esophagogastroduodenoscopy Procedure Note      Derick Jane Jr  1945  [id number]    Indication:  CBD migrated stents seen on CTAP      : Agustín Malhotra MD    Referring Provider:  Brian Nur MD    Sedation:  MAC anesthesia    Procedure Details:  After detailed informed consent was obtained for the procedure, with all risks and benefits of procedure explained the patient was taken to the endoscopy suite and placed in the left lateral decubitus position.  Following sequential administration of sedation as per above, the endoscope was inserted into the mouth and advanced under direct vision to second portion of the duodenum.  A careful inspection was made as the gastroscope was withdrawn, including a retroflexed view of the proximal stomach; findings and interventions are described below.      Findings:     Oropharynx:  Phlegm noted.    Esophagus:  Moderate candida esophagitis in proximal and mid esophagus noted.  Swallowed phlegm seen.   The squamo-columnar junction is at 40 cm where the Z-line was noted.     Stomach:   The gastric mucosa has erythema in the body and antrum.   The angularis is normal as well.    Duodenum:   The bulb and post bulbar mucosa is normal in appearance.   2 migrated bilary stents seen in D2.  Both stents were removed using a rat tooth forceps easily.  Stents (plastic, 7 Fr) appear obstructed on review with sludge/debris.    The duodenal folds are normal.     Therapies:  removal of foriegn body/ bilary stents) using rat tooth    Specimen: NA           Complications:   None were encountered during the procedure.    EBL:  None.          Recommendations:     -Start PO liquids only.  -Add Diflucan PO.  -Stenting of biliary system as per IR. We defer timing of procedure to them.  -Continue IV abx and treat sepsis.  -I left a long VM for his son, Sal, on the phone.      Thank you for entrusting me with this patient's care. Please do not  hesitate to contact me with any questions or if I can be of assistance with any of your other patients' GI needs.      Kym Malhotra MD  1/19/2024  4:05 PM

## 2024-01-19 NOTE — CONSULTS
Shanon Amaya, NP-C                       [phone] cell              Monday-Thursday 7:30 am-4:30 pm                     Friday 7:30 am-12:00 pm     Gastroenterology Consultation Note      Admit Date: 1/18/2024  Consult Date: 1/19/2024   I greatly appreciate your asking me to see Derick Jane , thank you very much for the opportunity to participate in his care.    Narrative Assessment and Plan   GI consultation for cholangitis. 78 y/o male with recent diagnosis of cholangiocarcinoma s/p ERCP on 1/2/2024 at [facility] with stents placed. Brought to ER with worsening weakness and anorexia. WBC 21. LFTs very elevated although comparable to last values at [facility]. Temp 99. CT shows migration of stents to below obstruction so he is again obstructed. He takes Eliquis for new PE, last dose yesterday afternoon.     Impression:  Cholangiocarcinoma  Cholangitis  Leukocytosis  Elevated LFTs  PE on AC  Parkinson's disease    Mr. Jane asked me to call his son Sohan at [phone] and speak with him regarding ERCP and high risks given he is taking Eliquis. I was unable to reach his son but Dr. Malhotra was able to reach him. Dr. Malhotra discussed extensively the high risk of mortality associated with ERCP when on blood thinners and that there was increased risks of bleeding after the procedure but he is obstructed and has cholangitis. His son and his wife want to proceed with ERCP this afternoon. I notified endoscopy and he will be added to the schedule today. Please keep NPO and I held the Eliquis.  Follow CBC and LFTs  Continue abx    Subjective:     Chief Complaint: Weakness    History of Present Illness:  GI consult for possible cholangitis. 78 y/o male with history of cholangiocarcinoma recently diagnosed at [facility] while hospitalized there from 12/29-1/9 . He underwent ERCP at [facility] with placement of stents on 1/2/2024. Brought to hospital due to weakness and  136 - 145 mmol/L    Potassium 4.0 3.5 - 5.1 mmol/L    Chloride 106 97 - 108 mmol/L    CO2 25 21 - 32 mmol/L    Anion Gap 4 (L) 5 - 15 mmol/L    Glucose 96 65 - 100 mg/dL    BUN 30 (H) 6 - 20 MG/DL    Creatinine 0.97 0.70 - 1.30 MG/DL    Bun/Cre Ratio 31 (H) 12 - 20      Est, Glom Filt Rate >60 >60 ml/min/1.73m2    Calcium 8.3 (L) 8.5 - 10.1 MG/DL   Hepatic Function Panel    Collection Time: 01/19/24  5:26 AM   Result Value Ref Range    Total Protein 5.2 (L) 6.4 - 8.2 g/dL    Albumin 1.7 (L) 3.5 - 5.0 g/dL    Globulin 3.5 2.0 - 4.0 g/dL    Albumin/Globulin Ratio 0.5 (L) 1.1 - 2.2      Total Bilirubin 12.2 (H) 0.2 - 1.0 MG/DL    Bilirubin, Direct 10.1 (H) 0.0 - 0.2 MG/DL    Alk Phosphatase 749 (H) 45 - 117 U/L     (H) 15 - 37 U/L    ALT 36 12 - 78 U/L   CBC with Auto Differential    Collection Time: 01/19/24  5:26 AM   Result Value Ref Range    WBC 21.1 (H) 4.1 - 11.1 K/uL    RBC 2.45 (L) 4.10 - 5.70 M/uL    Hemoglobin 8.3 (L) 12.1 - 17.0 g/dL    Hematocrit 24.1 (L) 36.6 - 50.3 %    MCV 98.4 80.0 - 99.0 FL    MCH 33.9 26.0 - 34.0 PG    MCHC 34.4 30.0 - 36.5 g/dL    RDW 17.1 (H) 11.5 - 14.5 %    Platelets 226 150 - 400 K/uL    MPV 10.8 8.9 - 12.9 FL    Nucleated RBCs 0.0 0  WBC    nRBC 0.00 0.00 - 0.01 K/uL    Neutrophils % 88 (H) 32 - 75 %    Lymphocytes % 4 (L) 12 - 49 %    Monocytes % 6 5 - 13 %    Eosinophils % 0 0 - 7 %    Basophils % 0 0 - 1 %    Immature Granulocytes 2 (H) 0.0 - 0.5 %    Neutrophils Absolute 18.5 (H) 1.8 - 8.0 K/UL    Lymphocytes Absolute 0.9 0.8 - 3.5 K/UL    Monocytes Absolute 1.3 (H) 0.0 - 1.0 K/UL    Eosinophils Absolute 0.0 0.0 - 0.4 K/UL    Basophils Absolute 0.0 0.0 - 0.1 K/UL    Absolute Immature Granulocyte 0.4 (H) 0.00 - 0.04 K/UL    Differential Type AUTOMATED           Assessment/Plan:     Principal Problem:    Sepsis (HCC)  Resolved Problems:    * No resolved hospital problems. *       See above narrative for full detail.    Shanon Amaya, SABINO - NP

## 2024-01-20 LAB
ALBUMIN SERPL-MCNC: 1.6 G/DL (ref 3.5–5)
ALBUMIN/GLOB SERPL: 0.4 (ref 1.1–2.2)
ALP SERPL-CCNC: 667 U/L (ref 45–117)
ALT SERPL-CCNC: 80 U/L (ref 12–78)
ANION GAP SERPL CALC-SCNC: 4 MMOL/L (ref 5–15)
AST SERPL-CCNC: 213 U/L (ref 15–37)
BILIRUB SERPL-MCNC: 11.4 MG/DL (ref 0.2–1)
BUN SERPL-MCNC: 27 MG/DL (ref 6–20)
BUN/CREAT SERPL: 32 (ref 12–20)
CALCIUM SERPL-MCNC: 8.7 MG/DL (ref 8.5–10.1)
CHLORIDE SERPL-SCNC: 106 MMOL/L (ref 97–108)
CO2 SERPL-SCNC: 25 MMOL/L (ref 21–32)
CREAT SERPL-MCNC: 0.84 MG/DL (ref 0.7–1.3)
ERYTHROCYTE [DISTWIDTH] IN BLOOD BY AUTOMATED COUNT: 17.1 % (ref 11.5–14.5)
GLOBULIN SER CALC-MCNC: 3.6 G/DL (ref 2–4)
GLUCOSE SERPL-MCNC: 97 MG/DL (ref 65–100)
HCT VFR BLD AUTO: 28 % (ref 36.6–50.3)
HGB BLD-MCNC: 9.6 G/DL (ref 12.1–17)
LACTATE SERPL-SCNC: 1.3 MMOL/L (ref 0.4–2)
MAGNESIUM SERPL-MCNC: 2 MG/DL (ref 1.6–2.4)
MCH RBC QN AUTO: 34 PG (ref 26–34)
MCHC RBC AUTO-ENTMCNC: 34.3 G/DL (ref 30–36.5)
MCV RBC AUTO: 99.3 FL (ref 80–99)
NRBC # BLD: 0 K/UL (ref 0–0.01)
NRBC BLD-RTO: 0 PER 100 WBC
PHOSPHATE SERPL-MCNC: 3 MG/DL (ref 2.6–4.7)
PLATELET # BLD AUTO: 262 K/UL (ref 150–400)
PMV BLD AUTO: 11.3 FL (ref 8.9–12.9)
POTASSIUM SERPL-SCNC: 4 MMOL/L (ref 3.5–5.1)
PROT SERPL-MCNC: 5.2 G/DL (ref 6.4–8.2)
RBC # BLD AUTO: 2.82 M/UL (ref 4.1–5.7)
SODIUM SERPL-SCNC: 135 MMOL/L (ref 136–145)
WBC # BLD AUTO: 16.6 K/UL (ref 4.1–11.1)

## 2024-01-20 PROCEDURE — 83735 ASSAY OF MAGNESIUM: CPT

## 2024-01-20 PROCEDURE — 6370000000 HC RX 637 (ALT 250 FOR IP): Performed by: STUDENT IN AN ORGANIZED HEALTH CARE EDUCATION/TRAINING PROGRAM

## 2024-01-20 PROCEDURE — 6370000000 HC RX 637 (ALT 250 FOR IP): Performed by: INTERNAL MEDICINE

## 2024-01-20 PROCEDURE — 2580000003 HC RX 258: Performed by: STUDENT IN AN ORGANIZED HEALTH CARE EDUCATION/TRAINING PROGRAM

## 2024-01-20 PROCEDURE — 85027 COMPLETE CBC AUTOMATED: CPT

## 2024-01-20 PROCEDURE — 84100 ASSAY OF PHOSPHORUS: CPT

## 2024-01-20 PROCEDURE — 80053 COMPREHEN METABOLIC PANEL: CPT

## 2024-01-20 PROCEDURE — 92610 EVALUATE SWALLOWING FUNCTION: CPT

## 2024-01-20 PROCEDURE — 6360000002 HC RX W HCPCS: Performed by: STUDENT IN AN ORGANIZED HEALTH CARE EDUCATION/TRAINING PROGRAM

## 2024-01-20 PROCEDURE — 83605 ASSAY OF LACTIC ACID: CPT

## 2024-01-20 PROCEDURE — 36415 COLL VENOUS BLD VENIPUNCTURE: CPT

## 2024-01-20 PROCEDURE — 2060000000 HC ICU INTERMEDIATE R&B

## 2024-01-20 RX ADMIN — LATANOPROST 1 DROP: 50 SOLUTION OPHTHALMIC at 23:56

## 2024-01-20 RX ADMIN — Medication 1 AMPULE: at 08:00

## 2024-01-20 RX ADMIN — CARBIDOPA AND LEVODOPA 1 TABLET: 25; 100 TABLET ORAL at 16:30

## 2024-01-20 RX ADMIN — Medication 1 AMPULE: at 23:56

## 2024-01-20 RX ADMIN — PIPERACILLIN AND TAZOBACTAM 3375 MG: 3; .375 INJECTION, POWDER, FOR SOLUTION INTRAVENOUS at 04:02

## 2024-01-20 RX ADMIN — DEXTROSE AND SODIUM CHLORIDE: 5; 450 INJECTION, SOLUTION INTRAVENOUS at 08:04

## 2024-01-20 RX ADMIN — FLUCONAZOLE 100 MG: 100 TABLET ORAL at 07:39

## 2024-01-20 RX ADMIN — LATANOPROST 1 DROP: 50 SOLUTION OPHTHALMIC at 03:59

## 2024-01-20 RX ADMIN — Medication 1 TABLET: at 07:39

## 2024-01-20 RX ADMIN — DEXTROSE AND SODIUM CHLORIDE: 5; 450 INJECTION, SOLUTION INTRAVENOUS at 18:47

## 2024-01-20 RX ADMIN — CARBIDOPA AND LEVODOPA 1 TABLET: 25; 100 TABLET ORAL at 12:27

## 2024-01-20 RX ADMIN — CARBIDOPA AND LEVODOPA 1 TABLET: 25; 100 TABLET ORAL at 03:52

## 2024-01-20 RX ADMIN — FOLIC ACID 1 MG: 1 TABLET ORAL at 07:39

## 2024-01-20 RX ADMIN — PIPERACILLIN AND TAZOBACTAM 3375 MG: 3; .375 INJECTION, POWDER, FOR SOLUTION INTRAVENOUS at 12:33

## 2024-01-20 RX ADMIN — MIDODRINE HYDROCHLORIDE 5 MG: 5 TABLET ORAL at 07:40

## 2024-01-20 RX ADMIN — Medication 100 MG: at 07:39

## 2024-01-20 RX ADMIN — CARBIDOPA AND LEVODOPA 1 TABLET: 25; 100 TABLET ORAL at 07:40

## 2024-01-20 RX ADMIN — PIPERACILLIN AND TAZOBACTAM 3375 MG: 3; .375 INJECTION, POWDER, FOR SOLUTION INTRAVENOUS at 18:49

## 2024-01-20 RX ADMIN — SODIUM CHLORIDE, PRESERVATIVE FREE 10 ML: 5 INJECTION INTRAVENOUS at 23:56

## 2024-01-20 ASSESSMENT — PAIN SCALES - GENERAL
PAINLEVEL_OUTOF10: 0
PAINLEVEL_OUTOF10: 0

## 2024-01-20 NOTE — PROGRESS NOTES
Hospitalist Progress Note    NAME:   Derick Jane Jr   : 1945   MRN: [id number]     Date/Time: 2024 8:20 AM  Patient PCP: Brian Nur MD    Estimated discharge date: >2 days  Barriers: GI clearance, IR guided biliary stent placement , repeat blood cultures, PT OT recommending SNF      Assessment / Plan:  Sepsis  Klebsiella oxytoca bacteremia  Possible ascending cholangitis  Candidal esophagitis  Leukocytosis  Admit to stepdown unit  Continue empiric vancomycin and Zosyn  -Pharmacy consulted for vancomycin dosing  Follow cultures  De-escalate antibiotics as appropriate  Status post IV fluids  : Patient started on oral Diflucan for candidal esophagitis from last night.  Blood culture is growing Klebsiella oxytoca.  Continue with IV Zosyn.  DC vancomycin.  Will repeat blood cultures tomorrow morning.  White count is trending down at 16.6.     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  Abnormal LFT  Anasarca secondary to above  Precipitous functional decline  Trend hepatic function panel  Gastroenterology consulted, greatly appreciate their expertise  Oncology consulted, greatly appreciate their expertise  Palliative care consulted, greatly appreciate their expertise     Summary of oncologic workup from [facility]:  Admitted -  On admission , ALT 47, alkaline phosphatase 1129, total bilirubin 18 (direct bilirubin 13) lipase WNL  Ultrasound and CT abdomen pelvis demonstrating intrahepatic biliary ductal dilatation with abrupt tapering of common hepatic duct worrisome for extrinsic compression  MRCP redemonstrated intrahepatic biliary ductal dilatation with concern for underlying mass  Labs: Hep C negative, hep B nonimmune-vaccinated at [facility], CEA 9, AFP WNL, CA 19-9 1426  ERCP performed  demonstrating biliary strictures-malignant in appearance, stents were placed the hepatic duct and bifurcation were dilatated and sphincterotomy was performed  [place] samples

## 2024-01-20 NOTE — PLAN OF CARE
Speech LAnguage Pathology EVALUATION    Patient: Derick Jane Jr (79 y.o. male)  Date: 1/20/2024  Primary Diagnosis: Acute hyponatremia [E87.1]  Hypoglycemia [E16.2]  Cholangiocarcinoma (HCC) [C22.1]  Failure to thrive in adult [R62.7]  Sepsis (HCC) [A41.9]  Procedure(s) (LRB):  EGD ESOPHAGOGASTRODUODENOSCOPY (N/A) 1 Day Post-Op   Precautions:  Fall Risk, Bed Alarm                  ASSESSMENT :  Based on the objective data described below, the patient presents with mild oropharyngeal dysphagia. Orally he is mildly slow to masticate and manipulate the bolus but is wfl. Throat clearing noted once after thins and with PD he is at risk for silent aspiration. He reports he has had no pneumonia in the past and denies dysphagia. Would recommend easy to chew diet /thins. He prefers no straw so cup drinking is preferable for him.   He was fully oriented and appropriate in conversation. He is followed for his Parkinson's at [facility].   We will follow up and complete imaging if appropriate.     Patient will benefit from skilled intervention to address the above impairments.     PLAN :  Recommendations and Planned Interventions:  Diet: Easy to chew  Thins   Meds whole   Imaging as indicated      Acute SLP Services: Yes, SLP will continue to follow per plan of care.    Discharge Recommendations: Continue to assess pending progress     SUBJECTIVE:   Patient stated, he has not had pneumonia.     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.0) 11/19/2009    4/3/17 followed by PCP no cardiologist    Parkinson disease     Prostatism 11/19/2009    Skin cancer      Past Surgical History:   Procedure Laterality Date    APPENDECTOMY  childhood    COLONOSCOPY N/A 2/7/2018    COLONOSCOPY performed by Elver Reich MD at [facility] AMBULAT[facility] OR    COLONOSCOPY       Prior Level of Function/Home Situation:   Social/Functional History  Lives With: Spouse, Other (comment) (he states caregivers have been set  recommended diet changes were discussed with: Registered nurse    Patient/family have participated as able in goal setting and plan of care    Thank you,  Gladys Kaminski, SLP  Minutes: 20   Problem: SLP Adult - Impaired Swallowing  Goal: By Discharge: Advance to least restrictive diet without signs or symptoms of aspiration for planned discharge setting.  See evaluation for individualized goals.  Description: 1/20/2024  Speech path goals  1. Patient will tolerate least restrictive diet with no overt s/s of aspiration.   Outcome: Progressing

## 2024-01-20 NOTE — PROGRESS NOTES
Comprehensive Nutrition Assessment    Type and Reason for Visit:  Initial, Consult    Nutrition Recommendations/Plan:   Easy to chew diet per SLP  Ensure compact TID  Please document % meals and supplements consumed in flowsheet I/O's under intake      Malnutrition Assessment:  Malnutrition Status:  At risk for malnutrition (Comment) (01/20/24 2524)        Nutrition Assessment:     Consult received for weight loss. Pt medically noted for sepsis, bacteremia, esophagitis, new dx cholangiocarcinoma, precipitous functional decline, Parkinson's disease, autonomic instability with frequent hypotension, hyponatremia improving, recent COVID. Recent discharge from [facility]. MST not completed on admission and no weight hx available in the EMR. Pt seen by SLP this morning and cleared for an easy to chew diet with thin liquids. He is at risk for aspiration as Parkinson's progresses. Will add supplements and follow up.     No PO intake documented yet.     Wt Readings from Last 5 Encounters:   01/18/24 71.6 kg (157 lb 13.6 oz)   ]    Nutrition Related Findings:    Labs: Na 135, elevated LFTs, Hgb 9.6.   Meds: diflucan, folic acid, zosyn, MVI, thiamine, D5 1/2NS.   BM PTA.   Wound Type: None       Current Nutrition Intake & Therapies:    Average Meal Intake: Unable to assess  Average Supplements Intake: None Ordered  ADULT DIET; Easy to Chew  ADULT ORAL NUTRITION SUPPLEMENT; Breakfast, Lunch, Dinner; Standard 4 oz Oral Supplement    Anthropometric Measures:  Height: 182.9 cm (6')  Ideal Body Weight (IBW): 178 lbs (81 kg)       Current Body Weight: 71.6 kg (157 lb 13.6 oz), 88.7 % IBW. Weight Source: Not Specified  Current BMI (kg/m2): 21.4        Weight Adjustment For: No Adjustment                 BMI Categories: Underweight (BMI less than 22) age over 65    Estimated Daily Nutrient Needs:  Energy Requirements Based On: Formula  Weight Used for Energy Requirements: Current  Energy (kcal/day): 1910 kcals (BMR x 1.3AF)  Weight Used for

## 2024-01-20 NOTE — PROGRESS NOTES
Oncology consult. Preliminary chart review note.      79-year-old male with recent diagnosis of cholangiocarcinoma with biliary obstruction. He presented with obstructive jaundice despite prior stent placement. Imaging showed stents migrated.    He is currently following with surgical oncology at [facility].     At present, the only way to improve his hyperbilirubinemia would be with a IR guided percutaneous drain. His total bili watts would need to be less than two in order to be a candidate for chemotherapy.    Patient will be fully evaluated tomorrow.    Please call with any questions or concerns.     Agree with plans for IR guided biliary drain on Monday.      Yes

## 2024-01-20 NOTE — PROGRESS NOTES
GI Progress Note ([facility] for [facility])  NAME:Derick Jane Jr :1945 MRN:[id number]   ATTG: Dr. Roger  PCP: Brian Nur MD  Date/Time:  2024 10:17 AM     Primary GI: [facility]    Reason for following: biliary obstruction    Assessment:   Intrahepatic/hilar cholangiocarcinoma (diagnosed 24 at [facility]) and associated biliary obstruction, sp prior biliary stenting with migration, stents remvoed via EGD   Hyperbilirubinemia  Candida esophagitis  Sepsis 2/2 cholangitis (klebsiella bacteremia from  cx)  Parkinson's, chronic anticoagulation for hx of PE    Plan:   Serial LFTs  Cotninue fluconazole  Biliary stenting with IR, likely Monday  Continue to hold eliquis  Continue empiric IV abx (now zosyn)    Subjective:   Discussed with RN events overnight. No issues.  Patient conversant this AM. States he wants his wife to be his decision maker. Then, we talked about current health issues and plan of care.  Reviewed EGD with removal of stents. He understood completely and seemed to recall well the events these past few days. He's not sure about the IR drainage, and wants to discuss the risks and benefits with IR before he agrees to this. He's not sure he'll be ready without thinking about it for awhile, so he requests they talk to him and let him consider rather than do it right beforehand. Given his relative stability, I'm sure this is fine. I told him if it were to become emergent or more urgent even, they may have to meet him and discuss it and move forward more quickly. We generally reviewed the IR procedure, but not in detail. I also told him they don't come in on the weekends to have this discussion, so it would likely occur Monday.     WBC 16K, from 12K yesterday.  Tbili 11.4, from 12.2 yest  Alk phos 667    Review of Systems:  Symptom Y/N Comments  Symptom Y/N Comments   Fever/Chills n   Chest Pain n    Cough n   Headaches n    Sputum n   Joint Pain n    SOB/SHAH n   Pruritis/Rash n    Tolerating

## 2024-01-21 LAB
ALBUMIN SERPL-MCNC: 1.5 G/DL (ref 3.5–5)
ALBUMIN/GLOB SERPL: 0.4 (ref 1.1–2.2)
ALP SERPL-CCNC: 648 U/L (ref 45–117)
ALT SERPL-CCNC: 45 U/L (ref 12–78)
ANION GAP SERPL CALC-SCNC: 6 MMOL/L (ref 5–15)
AST SERPL-CCNC: 200 U/L (ref 15–37)
BACTERIA SPEC CULT: ABNORMAL
BILIRUB SERPL-MCNC: 8.4 MG/DL (ref 0.2–1)
BUN SERPL-MCNC: 20 MG/DL (ref 6–20)
BUN/CREAT SERPL: 25 (ref 12–20)
CALCIUM SERPL-MCNC: 8.5 MG/DL (ref 8.5–10.1)
CHLORIDE SERPL-SCNC: 108 MMOL/L (ref 97–108)
CO2 SERPL-SCNC: 24 MMOL/L (ref 21–32)
CREAT SERPL-MCNC: 0.79 MG/DL (ref 0.7–1.3)
ERYTHROCYTE [DISTWIDTH] IN BLOOD BY AUTOMATED COUNT: 16.5 % (ref 11.5–14.5)
GLOBULIN SER CALC-MCNC: 3.5 G/DL (ref 2–4)
GLUCOSE SERPL-MCNC: 94 MG/DL (ref 65–100)
HCT VFR BLD AUTO: 25.2 % (ref 36.6–50.3)
HGB BLD-MCNC: 8.6 G/DL (ref 12.1–17)
MAGNESIUM SERPL-MCNC: 1.9 MG/DL (ref 1.6–2.4)
MCH RBC QN AUTO: 33.3 PG (ref 26–34)
MCHC RBC AUTO-ENTMCNC: 34.1 G/DL (ref 30–36.5)
MCV RBC AUTO: 97.7 FL (ref 80–99)
NRBC # BLD: 0 K/UL (ref 0–0.01)
NRBC BLD-RTO: 0 PER 100 WBC
PHOSPHATE SERPL-MCNC: 2.7 MG/DL (ref 2.6–4.7)
PLATELET # BLD AUTO: 246 K/UL (ref 150–400)
PMV BLD AUTO: 10.9 FL (ref 8.9–12.9)
POTASSIUM SERPL-SCNC: 3.7 MMOL/L (ref 3.5–5.1)
PROT SERPL-MCNC: 5 G/DL (ref 6.4–8.2)
RBC # BLD AUTO: 2.58 M/UL (ref 4.1–5.7)
SERVICE CMNT-IMP: ABNORMAL
SERVICE CMNT-IMP: ABNORMAL
SODIUM SERPL-SCNC: 138 MMOL/L (ref 136–145)
WBC # BLD AUTO: 14.7 K/UL (ref 4.1–11.1)

## 2024-01-21 PROCEDURE — 85027 COMPLETE CBC AUTOMATED: CPT

## 2024-01-21 PROCEDURE — 2580000003 HC RX 258: Performed by: STUDENT IN AN ORGANIZED HEALTH CARE EDUCATION/TRAINING PROGRAM

## 2024-01-21 PROCEDURE — 6360000002 HC RX W HCPCS: Performed by: STUDENT IN AN ORGANIZED HEALTH CARE EDUCATION/TRAINING PROGRAM

## 2024-01-21 PROCEDURE — 99222 1ST HOSP IP/OBS MODERATE 55: CPT | Performed by: STUDENT IN AN ORGANIZED HEALTH CARE EDUCATION/TRAINING PROGRAM

## 2024-01-21 PROCEDURE — 6370000000 HC RX 637 (ALT 250 FOR IP): Performed by: INTERNAL MEDICINE

## 2024-01-21 PROCEDURE — 2060000000 HC ICU INTERMEDIATE R&B

## 2024-01-21 PROCEDURE — 36415 COLL VENOUS BLD VENIPUNCTURE: CPT

## 2024-01-21 PROCEDURE — 83735 ASSAY OF MAGNESIUM: CPT

## 2024-01-21 PROCEDURE — 87040 BLOOD CULTURE FOR BACTERIA: CPT

## 2024-01-21 PROCEDURE — 84100 ASSAY OF PHOSPHORUS: CPT

## 2024-01-21 PROCEDURE — 80053 COMPREHEN METABOLIC PANEL: CPT

## 2024-01-21 PROCEDURE — 2700000000 HC OXYGEN THERAPY PER DAY

## 2024-01-21 PROCEDURE — 6370000000 HC RX 637 (ALT 250 FOR IP): Performed by: STUDENT IN AN ORGANIZED HEALTH CARE EDUCATION/TRAINING PROGRAM

## 2024-01-21 RX ADMIN — SODIUM CHLORIDE, PRESERVATIVE FREE 10 ML: 5 INJECTION INTRAVENOUS at 22:29

## 2024-01-21 RX ADMIN — CARBIDOPA AND LEVODOPA 1 TABLET: 25; 100 TABLET ORAL at 18:10

## 2024-01-21 RX ADMIN — PIPERACILLIN AND TAZOBACTAM 3375 MG: 3; .375 INJECTION, POWDER, FOR SOLUTION INTRAVENOUS at 12:27

## 2024-01-21 RX ADMIN — FOLIC ACID 1 MG: 1 TABLET ORAL at 09:36

## 2024-01-21 RX ADMIN — MIDODRINE HYDROCHLORIDE 5 MG: 5 TABLET ORAL at 12:24

## 2024-01-21 RX ADMIN — CARBIDOPA AND LEVODOPA 1 TABLET: 25; 100 TABLET ORAL at 01:04

## 2024-01-21 RX ADMIN — CARBIDOPA AND LEVODOPA 1 TABLET: 25; 100 TABLET ORAL at 09:45

## 2024-01-21 RX ADMIN — Medication 100 MG: at 09:36

## 2024-01-21 RX ADMIN — Medication 1 AMPULE: at 09:38

## 2024-01-21 RX ADMIN — CARBIDOPA AND LEVODOPA 1 TABLET: 25; 100 TABLET ORAL at 22:29

## 2024-01-21 RX ADMIN — ACETAMINOPHEN 650 MG: 325 TABLET ORAL at 09:36

## 2024-01-21 RX ADMIN — PIPERACILLIN AND TAZOBACTAM 3375 MG: 3; .375 INJECTION, POWDER, FOR SOLUTION INTRAVENOUS at 03:30

## 2024-01-21 RX ADMIN — PIPERACILLIN AND TAZOBACTAM 3375 MG: 3; .375 INJECTION, POWDER, FOR SOLUTION INTRAVENOUS at 18:12

## 2024-01-21 RX ADMIN — LATANOPROST 1 DROP: 50 SOLUTION OPHTHALMIC at 22:32

## 2024-01-21 RX ADMIN — CARBIDOPA AND LEVODOPA 1 TABLET: 25; 100 TABLET ORAL at 12:23

## 2024-01-21 RX ADMIN — Medication 1 TABLET: at 09:36

## 2024-01-21 RX ADMIN — FLUCONAZOLE 100 MG: 100 TABLET ORAL at 09:36

## 2024-01-21 ASSESSMENT — PAIN SCALES - GENERAL
PAINLEVEL_OUTOF10: 0
PAINLEVEL_OUTOF10: 0

## 2024-01-21 NOTE — PROGRESS NOTES
GI progress note    Chart reviewed, and no change to plan of care.   Primary GI team will resume care tomorrow

## 2024-01-21 NOTE — PROGRESS NOTES
Hospitalist Progress Note    NAME:   Derick Jane Jr   : 1945   MRN: [id number]     Date/Time: 2024 8:22 AM  Patient PCP: Brian Nur MD    Estimated discharge date: >2 days  Barriers: GI clearance, IR guided biliary stent placement , repeat blood cultures, PT OT recommending SNF      Assessment / Plan:  Sepsis  Klebsiella oxytoca bacteremia  Possible ascending cholangitis  Candidal esophagitis  Leukocytosis  Admit to stepdown unit  Continue empiric vancomycin and Zosyn  -Pharmacy consulted for vancomycin dosing  Follow cultures  De-escalate antibiotics as appropriate  Status post IV fluids  : Patient started on oral Diflucan for candidal esophagitis from last night.  Blood culture is growing Klebsiella oxytoca.  Continue with IV Zosyn.  DC vancomycin.  Will repeat blood cultures tomorrow morning.  White count is trending down at 16.6.  : He repeat blood cultures have been drawn this morning.  White count is trending down at 14.7.  The     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  Abnormal LFT  Anasarca secondary to above  Precipitous functional decline  Trend hepatic function panel  Gastroenterology consulted, greatly appreciate their expertise  Oncology consulted, greatly appreciate their expertise  Palliative care consulted, greatly appreciate their expertise     Summary of oncologic workup from [facility]:  Admitted -  On admission , ALT 47, alkaline phosphatase 1129, total bilirubin 18 (direct bilirubin 13) lipase WNL  Ultrasound and CT abdomen pelvis demonstrating intrahepatic biliary ductal dilatation with abrupt tapering of common hepatic duct worrisome for extrinsic compression  MRCP redemonstrated intrahepatic biliary ductal dilatation with concern for underlying mass  Labs: Hep C negative, hep B nonimmune-vaccinated at [facility], CEA 9, AFP WNL, CA 19-9 1426  ERCP performed  demonstrating biliary strictures-malignant in appearance, stents

## 2024-01-21 NOTE — CONSULTS
[facility] [place] at [facility]  [address] [place] [id number]  W: [phone] F: [phone]    Reason for Visit:   Derick Jane Jr is a 79 y.o. male who is seen in consultation at the request of Dr. Roger for evaluation of recent diagnosis of cholangiocarcinoma.      Hematology / Oncology Treatment Information:     Hematological/Oncological Diagnosis: Cholangiocarcinoma     Date of Diagnosis: 1/4/24    Oncology/Hematology Treatment Course:   Treatment course:   1) ERCP with biliary stenting on 1/2/24   2) Repeat CT on 1/18/24 showed persistent intrahepatic ductal dilation concerning for hilar cholangiocarcinoma.       Pathology and Molecular Testing:     Interpretation     Suspicious for adenocarcinoma. (See comment).     Comment: The clinical history of a malignant-appearing biliary stricture is noted. The ThinPrep slides reveal numerous cohesive groups of disorganized atypical ductal cells with enlarged, irregular nuclei with some variation in nuclear size, open chromatin, prominent nucleoli, and moderate amounts of cytoplasm. Singly scattered enlarged, atypical cells with occasional binucleation are also noted in the background. Overall, the morphologic findings are suspicious for adenocarcinoma. Correlation with concurrent pancreaticobiliary FISH studies is recommended.         Initial Presentation  (HPI):     79-year-old male with recent diagnosis of cholangiocarcinoma with biliary obstruction. He presented with obstructive jaundice despite prior stent placement. Imaging showed stents migrated.    Significant medical morbidities including Parkinson's Disease. Baseline ECOG is 2-3.      He is currently following with surgical oncology at [facility].     At present, the only way to improve his hyperbilirubinemia would be with a IR guided percutaneous drain. His total bili watts would need to be less than two in order to be a candidate for

## 2024-01-22 ENCOUNTER — APPOINTMENT (OUTPATIENT)
Facility: HOSPITAL | Age: 79
End: 2024-01-22
Payer: MEDICARE

## 2024-01-22 ENCOUNTER — ANESTHESIA (OUTPATIENT)
Facility: HOSPITAL | Age: 79
End: 2024-01-22
Payer: MEDICARE

## 2024-01-22 ENCOUNTER — ANESTHESIA EVENT (OUTPATIENT)
Facility: HOSPITAL | Age: 79
End: 2024-01-22
Payer: MEDICARE

## 2024-01-22 LAB
ALBUMIN SERPL-MCNC: 1.4 G/DL (ref 3.5–5)
ALBUMIN/GLOB SERPL: 0.4 (ref 1.1–2.2)
ALP SERPL-CCNC: 782 U/L (ref 45–117)
ALT SERPL-CCNC: 46 U/L (ref 12–78)
ANION GAP SERPL CALC-SCNC: 3 MMOL/L (ref 5–15)
AST SERPL-CCNC: 252 U/L (ref 15–37)
BILIRUB SERPL-MCNC: 8.7 MG/DL (ref 0.2–1)
BUN SERPL-MCNC: 23 MG/DL (ref 6–20)
BUN/CREAT SERPL: 28 (ref 12–20)
CALCIUM SERPL-MCNC: 8.6 MG/DL (ref 8.5–10.1)
CHLORIDE SERPL-SCNC: 109 MMOL/L (ref 97–108)
CO2 SERPL-SCNC: 26 MMOL/L (ref 21–32)
CREAT SERPL-MCNC: 0.81 MG/DL (ref 0.7–1.3)
ERYTHROCYTE [DISTWIDTH] IN BLOOD BY AUTOMATED COUNT: 16.2 % (ref 11.5–14.5)
GLOBULIN SER CALC-MCNC: 3.7 G/DL (ref 2–4)
GLUCOSE SERPL-MCNC: 74 MG/DL (ref 65–100)
HCT VFR BLD AUTO: 28.3 % (ref 36.6–50.3)
HGB BLD-MCNC: 9.6 G/DL (ref 12.1–17)
LACTATE BLD-SCNC: 2.78 MMOL/L (ref 0.4–2)
MAGNESIUM SERPL-MCNC: 1.9 MG/DL (ref 1.6–2.4)
MCH RBC QN AUTO: 33.7 PG (ref 26–34)
MCHC RBC AUTO-ENTMCNC: 33.9 G/DL (ref 30–36.5)
MCV RBC AUTO: 99.3 FL (ref 80–99)
NRBC # BLD: 0 K/UL (ref 0–0.01)
NRBC BLD-RTO: 0 PER 100 WBC
PHOSPHATE SERPL-MCNC: 2.7 MG/DL (ref 2.6–4.7)
PLATELET # BLD AUTO: 251 K/UL (ref 150–400)
PMV BLD AUTO: 10.7 FL (ref 8.9–12.9)
POTASSIUM SERPL-SCNC: 3.9 MMOL/L (ref 3.5–5.1)
PROT SERPL-MCNC: 5.1 G/DL (ref 6.4–8.2)
RBC # BLD AUTO: 2.85 M/UL (ref 4.1–5.7)
SODIUM SERPL-SCNC: 138 MMOL/L (ref 136–145)
WBC # BLD AUTO: 13.2 K/UL (ref 4.1–11.1)

## 2024-01-22 PROCEDURE — 3700000000 HC ANESTHESIA ATTENDED CARE

## 2024-01-22 PROCEDURE — 85027 COMPLETE CBC AUTOMATED: CPT

## 2024-01-22 PROCEDURE — 2580000003 HC RX 258: Performed by: NURSE ANESTHETIST, CERTIFIED REGISTERED

## 2024-01-22 PROCEDURE — 6360000002 HC RX W HCPCS: Performed by: NURSE ANESTHETIST, CERTIFIED REGISTERED

## 2024-01-22 PROCEDURE — 6370000000 HC RX 637 (ALT 250 FOR IP): Performed by: STUDENT IN AN ORGANIZED HEALTH CARE EDUCATION/TRAINING PROGRAM

## 2024-01-22 PROCEDURE — 6360000004 HC RX CONTRAST MEDICATION: Performed by: STUDENT IN AN ORGANIZED HEALTH CARE EDUCATION/TRAINING PROGRAM

## 2024-01-22 PROCEDURE — 6360000002 HC RX W HCPCS: Performed by: STUDENT IN AN ORGANIZED HEALTH CARE EDUCATION/TRAINING PROGRAM

## 2024-01-22 PROCEDURE — C1769 GUIDE WIRE: HCPCS

## 2024-01-22 PROCEDURE — 83735 ASSAY OF MAGNESIUM: CPT

## 2024-01-22 PROCEDURE — 6370000000 HC RX 637 (ALT 250 FOR IP): Performed by: INTERNAL MEDICINE

## 2024-01-22 PROCEDURE — 84100 ASSAY OF PHOSPHORUS: CPT

## 2024-01-22 PROCEDURE — 2580000003 HC RX 258: Performed by: STUDENT IN AN ORGANIZED HEALTH CARE EDUCATION/TRAINING PROGRAM

## 2024-01-22 PROCEDURE — 2500000003 HC RX 250 WO HCPCS: Performed by: NURSE ANESTHETIST, CERTIFIED REGISTERED

## 2024-01-22 PROCEDURE — 87075 CULTR BACTERIA EXCEPT BLOOD: CPT

## 2024-01-22 PROCEDURE — 80053 COMPREHEN METABOLIC PANEL: CPT

## 2024-01-22 PROCEDURE — 87070 CULTURE OTHR SPECIMN AEROBIC: CPT

## 2024-01-22 PROCEDURE — 2060000000 HC ICU INTERMEDIATE R&B

## 2024-01-22 PROCEDURE — 7100000000 HC PACU RECOVERY - FIRST 15 MIN

## 2024-01-22 PROCEDURE — 87205 SMEAR GRAM STAIN: CPT

## 2024-01-22 PROCEDURE — 7100000001 HC PACU RECOVERY - ADDTL 15 MIN

## 2024-01-22 PROCEDURE — 3700000001 HC ADD 15 MINUTES (ANESTHESIA)

## 2024-01-22 PROCEDURE — 36415 COLL VENOUS BLD VENIPUNCTURE: CPT

## 2024-01-22 RX ORDER — ONDANSETRON 2 MG/ML
INJECTION INTRAMUSCULAR; INTRAVENOUS PRN
Status: DISCONTINUED | OUTPATIENT
Start: 2024-01-22 | End: 2024-01-22 | Stop reason: SDUPTHER

## 2024-01-22 RX ORDER — HEPARIN SODIUM 200 [USP'U]/100ML
800 INJECTION, SOLUTION INTRAVENOUS ONCE
Status: DISCONTINUED | OUTPATIENT
Start: 2024-01-22 | End: 2024-01-26 | Stop reason: HOSPADM

## 2024-01-22 RX ORDER — ROCURONIUM BROMIDE 10 MG/ML
INJECTION, SOLUTION INTRAVENOUS PRN
Status: DISCONTINUED | OUTPATIENT
Start: 2024-01-22 | End: 2024-01-22 | Stop reason: SDUPTHER

## 2024-01-22 RX ORDER — DEXAMETHASONE SODIUM PHOSPHATE 4 MG/ML
INJECTION, SOLUTION INTRA-ARTICULAR; INTRALESIONAL; INTRAMUSCULAR; INTRAVENOUS; SOFT TISSUE PRN
Status: DISCONTINUED | OUTPATIENT
Start: 2024-01-22 | End: 2024-01-22 | Stop reason: SDUPTHER

## 2024-01-22 RX ORDER — LIDOCAINE HYDROCHLORIDE 20 MG/ML
20 INJECTION, SOLUTION INFILTRATION; PERINEURAL ONCE
Status: DISCONTINUED | OUTPATIENT
Start: 2024-01-22 | End: 2024-01-26 | Stop reason: HOSPADM

## 2024-01-22 RX ORDER — SUCCINYLCHOLINE CHLORIDE 20 MG/ML
INJECTION INTRAMUSCULAR; INTRAVENOUS PRN
Status: DISCONTINUED | OUTPATIENT
Start: 2024-01-22 | End: 2024-01-22 | Stop reason: SDUPTHER

## 2024-01-22 RX ORDER — SODIUM CHLORIDE, SODIUM LACTATE, POTASSIUM CHLORIDE, CALCIUM CHLORIDE 600; 310; 30; 20 MG/100ML; MG/100ML; MG/100ML; MG/100ML
INJECTION, SOLUTION INTRAVENOUS CONTINUOUS PRN
Status: DISCONTINUED | OUTPATIENT
Start: 2024-01-22 | End: 2024-01-22 | Stop reason: SDUPTHER

## 2024-01-22 RX ORDER — FENTANYL CITRATE 50 UG/ML
INJECTION, SOLUTION INTRAMUSCULAR; INTRAVENOUS PRN
Status: DISCONTINUED | OUTPATIENT
Start: 2024-01-22 | End: 2024-01-22 | Stop reason: SDUPTHER

## 2024-01-22 RX ADMIN — PROPOFOL 100 MG: 10 INJECTION, EMULSION INTRAVENOUS at 12:16

## 2024-01-22 RX ADMIN — SODIUM CHLORIDE, PRESERVATIVE FREE 10 ML: 5 INJECTION INTRAVENOUS at 21:31

## 2024-01-22 RX ADMIN — FENTANYL CITRATE 25 MCG: 50 INJECTION, SOLUTION INTRAMUSCULAR; INTRAVENOUS at 13:02

## 2024-01-22 RX ADMIN — SODIUM CHLORIDE, PRESERVATIVE FREE 10 ML: 5 INJECTION INTRAVENOUS at 09:34

## 2024-01-22 RX ADMIN — IOPAMIDOL 40 ML: 755 INJECTION, SOLUTION INTRAVENOUS at 13:25

## 2024-01-22 RX ADMIN — FENTANYL CITRATE 25 MCG: 50 INJECTION, SOLUTION INTRAMUSCULAR; INTRAVENOUS at 12:59

## 2024-01-22 RX ADMIN — CARBIDOPA AND LEVODOPA 1 TABLET: 25; 100 TABLET ORAL at 21:23

## 2024-01-22 RX ADMIN — PHENYLEPHRINE HYDROCHLORIDE 40 MCG/MIN: 10 INJECTION INTRAVENOUS at 12:18

## 2024-01-22 RX ADMIN — ACETAMINOPHEN 650 MG: 325 TABLET ORAL at 18:47

## 2024-01-22 RX ADMIN — Medication 1 AMPULE: at 05:16

## 2024-01-22 RX ADMIN — PIPERACILLIN AND TAZOBACTAM 3375 MG: 3; .375 INJECTION, POWDER, FOR SOLUTION INTRAVENOUS at 15:40

## 2024-01-22 RX ADMIN — Medication 1 AMPULE: at 21:31

## 2024-01-22 RX ADMIN — PIPERACILLIN AND TAZOBACTAM 3375 MG: 3; .375 INJECTION, POWDER, FOR SOLUTION INTRAVENOUS at 04:00

## 2024-01-22 RX ADMIN — CARBIDOPA AND LEVODOPA 1 TABLET: 25; 100 TABLET ORAL at 05:09

## 2024-01-22 RX ADMIN — SODIUM CHLORIDE, POTASSIUM CHLORIDE, SODIUM LACTATE AND CALCIUM CHLORIDE: 600; 310; 30; 20 INJECTION, SOLUTION INTRAVENOUS at 12:02

## 2024-01-22 RX ADMIN — SUCCINYLCHOLINE CHLORIDE 120 MG: 20 INJECTION, SOLUTION INTRAMUSCULAR; INTRAVENOUS at 12:16

## 2024-01-22 RX ADMIN — ROCURONIUM BROMIDE 5 MG: 10 INJECTION INTRAVENOUS at 12:16

## 2024-01-22 RX ADMIN — LIDOCAINE HYDROCHLORIDE 60 MG: 20 INJECTION, SOLUTION EPIDURAL; INFILTRATION; INTRACAUDAL; PERINEURAL at 12:16

## 2024-01-22 RX ADMIN — Medication 1 AMPULE: at 09:33

## 2024-01-22 RX ADMIN — CARBIDOPA AND LEVODOPA 1 TABLET: 25; 100 TABLET ORAL at 09:32

## 2024-01-22 RX ADMIN — ONDANSETRON HYDROCHLORIDE 4 MG: 2 INJECTION, SOLUTION INTRAMUSCULAR; INTRAVENOUS at 12:54

## 2024-01-22 RX ADMIN — FENTANYL CITRATE 25 MCG: 50 INJECTION, SOLUTION INTRAMUSCULAR; INTRAVENOUS at 12:16

## 2024-01-22 RX ADMIN — DEXAMETHASONE SODIUM PHOSPHATE 8 MG: 4 INJECTION, SOLUTION INTRAMUSCULAR; INTRAVENOUS at 12:20

## 2024-01-22 ASSESSMENT — PAIN SCALES - GENERAL
PAINLEVEL_OUTOF10: 0
PAINLEVEL_OUTOF10: 0
PAINLEVEL_OUTOF10: 3
PAINLEVEL_OUTOF10: 0

## 2024-01-22 ASSESSMENT — PAIN DESCRIPTION - LOCATION: LOCATION: ABDOMEN

## 2024-01-22 NOTE — PROGRESS NOTES
Shanon Amaya, NP-C                       [phone] cell              Monday-Thursday 7:30 am-4:30 pm                     Friday 7:30 am-12:00 pm         GI PROGRESS NOTE        NAME:   Derick Jane Jr       :    1945       MRN:    [id number]     Assessment/Plan     GI consultation for cholangitis. 80 y/o male with recent diagnosis of cholangiocarcinoma s/p ERCP on 2024 at [facility] with stents placed. Brought to ER with worsening weakness and anorexia. WBC 21. LFTs very elevated although comparable to last values at [facility]. Temp 99. CT shows migration of stents to below obstruction so he is again obstructed. He takes Eliquis for new PE, last dose yesterday afternoon.      Impression:  Cholangiocarcinoma  Cholangitis  Leukocytosis  Elevated LFTs  PE on AC  Parkinson's disease    2024: EGD 2024 by Dr. Malhotra:  Findings:      Oropharynx:  Phlegm noted.     Esophagus:  Moderate candida esophagitis in proximal and mid esophagus noted.  Swallowed phlegm seen.   The squamo-columnar junction is at 40 cm where the Z-line was noted.      Stomach:   The gastric mucosa has erythema in the body and antrum.   The angularis is normal as well.     Duodenum:   The bulb and post bulbar mucosa is normal in appearance.   2 migrated bilary stents seen in D2.  Both stents were removed using a rat tooth forceps easily.  Stents (plastic, 7 Fr) appear obstructed on review with sludge/debris.     The duodenal folds are normal.      Therapies:  removal of foriegn body/ bilary stents) using rat tooth    Added fluconazole, deferred to IR stenting of biliary system. PM consult 2024 and it appears he was able to provide much more history than he could provide me the day of my consult. Oncology consult yesterday with plans for percutaneous drain by IR with additional tissue biopsies. Bcx+ enterobacter and klebsiella. Hungry this   4.1 - 11.1 K/uL    RBC 2.85 (L) 4.10 - 5.70 M/uL    Hemoglobin 9.6 (L) 12.1 - 17.0 g/dL    Hematocrit 28.3 (L) 36.6 - 50.3 %    MCV 99.3 (H) 80.0 - 99.0 FL    MCH 33.7 26.0 - 34.0 PG    MCHC 33.9 30.0 - 36.5 g/dL    RDW 16.2 (H) 11.5 - 14.5 %    Platelets 251 150 - 400 K/uL    MPV 10.7 8.9 - 12.9 FL    Nucleated RBCs 0.0 0  WBC    nRBC 0.00 0.00 - 0.01 K/uL   Comprehensive Metabolic Panel    Collection Time: 01/22/24  5:08 AM   Result Value Ref Range    Sodium 138 136 - 145 mmol/L    Potassium 3.9 3.5 - 5.1 mmol/L    Chloride 109 (H) 97 - 108 mmol/L    CO2 26 21 - 32 mmol/L    Anion Gap 3 (L) 5 - 15 mmol/L    Glucose 74 65 - 100 mg/dL    BUN 23 (H) 6 - 20 MG/DL    Creatinine 0.81 0.70 - 1.30 MG/DL    Bun/Cre Ratio 28 (H) 12 - 20      Est, Glom Filt Rate >60 >60 ml/min/1.73m2    Calcium 8.6 8.5 - 10.1 MG/DL    Total Bilirubin 8.7 (H) 0.2 - 1.0 MG/DL    ALT 46 12 - 78 U/L     (H) 15 - 37 U/L    Alk Phosphatase 782 (H) 45 - 117 U/L    Total Protein 5.1 (L) 6.4 - 8.2 g/dL    Albumin 1.4 (L) 3.5 - 5.0 g/dL    Globulin 3.7 2.0 - 4.0 g/dL    Albumin/Globulin Ratio 0.4 (L) 1.1 - 2.2     Magnesium    Collection Time: 01/22/24  5:08 AM   Result Value Ref Range    Magnesium 1.9 1.6 - 2.4 mg/dL   Phosphorus    Collection Time: 01/22/24  5:08 AM   Result Value Ref Range    Phosphorus 2.7 2.6 - 4.7 MG/DL       Imaging No GI imaging this morning      Medications Reviewed    PMH/SH reviewed - no change compared to H&P  Date/Time:  1/22/2024

## 2024-01-22 NOTE — ANESTHESIA POSTPROCEDURE EVALUATION
Department of Anesthesiology  Postprocedure Note    Patient: Derick Jane Jr  MRN: [id number]  YOB: 1945  Date of evaluation: 1/22/2024    Procedure Summary       Date: 01/22/24 Room / Location: [facility] ANGIO IR    Anesthesia Start: 1202 Anesthesia Stop: 1333    Procedure: IR PLMT ACCESS LAURA TREE SM BWL Diagnosis: (BL PTC)    Scheduled Providers: Krishan Lujan APRN - CRNA; Gregorio Brock MD Responsible Provider: Abel Guallpa MD    Anesthesia Type: General ASA Status: 3            Anesthesia Type: General    Jose Phase I: Jose Score: 8    Jose Phase II:      Anesthesia Post Evaluation    Patient location during evaluation: PACU  Patient participation: complete - patient participated  Level of consciousness: sleepy but conscious  Airway patency: patent  Nausea & Vomiting: no nausea and no vomiting  Cardiovascular status: hemodynamically stable  Respiratory status: acceptable and nasal cannula  Hydration status: stable  Multimodal analgesia pain management approach    No notable events documented.

## 2024-01-22 NOTE — PROGRESS NOTES
Hospitalist Progress Note    NAME:   Derick Jane Jr   : 1945   MRN: [id number]     Date/Time: 2024 7:47 AM  Patient PCP: Brian Nur MD    Estimated discharge date: >2 days  Barriers: GI clearance, IR guided biliary stent placement , repeat blood cultures, PT OT recommending SNF      Assessment / Plan:  Sepsis  Klebsiella oxytoca bacteremia  Possible ascending cholangitis  Candidal esophagitis  Leukocytosis  Admit to stepdown unit  Continue empiric vancomycin and Zosyn  -Pharmacy consulted for vancomycin dosing  Follow cultures  De-escalate antibiotics as appropriate  Status post IV fluids  : Patient started on oral Diflucan for candidal esophagitis from last night.  Blood culture is growing Klebsiella oxytoca.  Continue with IV Zosyn.  DC vancomycin.  Will repeat blood cultures tomorrow morning.  White count is trending down at 16.6.  : He repeat blood cultures have been drawn this morning.  White count is trending down at 14.7.   : White count trending down at 13.2.  Repeat blood cultures are negative so far.     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  Abnormal LFT  Anasarca secondary to above  Precipitous functional decline  Trend hepatic function panel  Gastroenterology consulted, greatly appreciate their expertise  Oncology consulted, greatly appreciate their expertise  Palliative care consulted, greatly appreciate their expertise     Summary of oncologic workup from [facility]:  Admitted -  On admission , ALT 47, alkaline phosphatase 1129, total bilirubin 18 (direct bilirubin 13) lipase WNL  Ultrasound and CT abdomen pelvis demonstrating intrahepatic biliary ductal dilatation with abrupt tapering of common hepatic duct worrisome for extrinsic compression  MRCP redemonstrated intrahepatic biliary ductal dilatation with concern for underlying mass  Labs: Hep C negative, hep B nonimmune-vaccinated at [facility], CEA 9, AFP WNL, CA 19-9 1426  ERCP

## 2024-01-22 NOTE — CARE COORDINATION
Care Management Initial Assessment       RUR:15%  Readmission? No  1st IM letter given? Yes -     1st  letter given: No     01/22/24 0932   Service Assessment   Patient Orientation Alert and Oriented   Cognition Alert   History Provided By Patient   Primary Caregiver Other (Comment)  (suspect wife is managing care, has hospital bed, wheelchair, walker)   Support Systems Spouse/Significant Other;Friends/Neighbors   Patient's Healthcare Decision Maker is: Patient Declined (Legal Next of Kin Remains as Decision Maker)   PCP Verified by CM Yes  (states he is in practice with Dr. Nur)   Last Visit to PCP Within last 3 months   Prior Functional Level Assistance with the following:   Current Functional Level Assistance with the following:;Bathing;Dressing;Toileting;Mobility   Can patient return to prior living arrangement Unknown at present   Ability to make needs known: Fair   Financial Resources Medicare   Social/Functional History   Lives With Spouse   Type of Home House   Home Layout Two level   Home Access   (3 steps to enter)   Home Equipment Walker, standard;Wheelchair-manual  (Hospital bed)   Receives Help From Family   ADL Assistance Needs assistance   Ambulation Assistance Needs assistance   Transfer Assistance Needs assistance   Occupation Retired     CM met with patient, friend, introduced self, explained role and offered assistance  Patient appears very weak and frail and it seemed to take energy to answer questions  He lives with wife and has multiple DME  He states he has been in and out of the hospital including [facility]  He is not a     CM will assess for DC needs. Unsure if patient has resources at home for care. Will need to contact wife.    4:57 pm Talked with wife, son and PCA  Patient has [employer] for skilled home needs. He has a PCA 16 hours a day  He has hospital bed and DME  Family inclined to take him home but I did give them a SNF list to look at tonight.  Will work on  DC planning.    English Stefano LEAVITT CM   [id number]    English Stefano LEAVITT CM [phone]

## 2024-01-22 NOTE — ANESTHESIA POST-OP
TRANSFER - OUT REPORT:    Verbal report given to Juliann LEAVITT (name) on Derick Jane Jr  being transferred to 2153(unit) for routine post-op       Report consisted of patient’s Situation, Background, Assessment and   Recommendations(SBAR).     Information from the following report(s) Intake/Output, MAR, Recent Results, and Cardiac Rhythm SR/SB  was reviewed with the receiving nurse.    Opportunity for questions and clarification was provided.      Patient transported with:   Monitor  O2 @ 4 liters  Registered Nurse  Tech

## 2024-01-22 NOTE — PROGRESS NOTES
0700 Bedside shift report received from DAGMAR Urena. Report included the following information Nurse Handoff Report, MAR, Telemetry status, Recent Results, and plan of care. This RN verbalized understanding of plan of care with opportunity for clarification and questions. Care of patient assumed at this time. Dual skin check performed at this time.     1003 Patient transferred to IR for procedure at this time.     1427 TRANSFER - IN REPORT:    Verbal report received from DAGMAR Galindo on Derick Jane Jr  being received from PACU for routine post-op      Report consisted of patient's Situation, Background, Assessment and   Recommendations(SBAR).     Information from the following report(s) Nurse Handoff Report was reviewed with the receiving nurse.    Opportunity for questions and clarification was provided.      Assessment completed upon patient's arrival to unit and care assumed.     1448 Patient returned from IR at this time. Reassessment completed, see flowsheet.    1711 Patient given sips of water by caregiver at this time. Mouth care completed.      1743 Patient continues to be sleeping, arousing to voice for brief periods; respirations even, unlabored, on room air.       1900 Bedside shift report given to  DAGMAR Qiu. Report included the following information Nurse Handoff Report, MAR, Telemetry status, Recent Results, and plan of care. Oncoming RN verbalized understanding of plan of care with opportunity for clarification and questions. Dual skin check performed at this time.       I have overseen Keisha Kapoor RN from 7am-7pm. During this time I have directly observed her patient care skills; medical knowledge and reasoning; and communication/ professionalism. I agree with the assessments, medication administration, and flow sheet charting provided by Keisha Kapoor RN.

## 2024-01-22 NOTE — PROGRESS NOTES
TRANSFER - OUT REPORT:    Verbal report given to DAGMAR Gray on Derick Jane Jr  being transferred to PACU for routine progression of patient care       Report consisted of patient's Situation, Background, Assessment and   Recommendations(SBAR).     Information from the following report(s) Nurse Handoff Report was reviewed with the receiving nurse.           Lines:   Peripheral IV 01/18/24 Left Antecubital (Active)   Site Assessment Clean, dry & intact 01/22/24 1330   Line Status Flushed 01/22/24 1330   Line Care Connections checked and tightened 01/22/24 1330   Phlebitis Assessment No symptoms 01/22/24 1330   Infiltration Assessment 0 01/22/24 1330   Alcohol Cap Used No 01/22/24 1330   Dressing Status Clean, dry & intact 01/22/24 1330   Dressing Type Transparent 01/22/24 1330        Opportunity for questions and clarification was provided.      Patient transported with:  Monitor

## 2024-01-22 NOTE — CARE COORDINATION
Advance Care Planning     General Advance Care Planning (ACP) Conversation    Date of Conversation: 1/18/2024  Conducted with: Patient with Decision Making Capacity    Healthcare Decision Maker:    Primary Decision Maker: BuckZulema - Spouse - [phone]    Primary Decision Maker: Sohan Jane D - Child - [phone]  Click here to complete Healthcare Decision Makers including selection of the Healthcare Decision Maker Relationship (ie \"Primary\").   Today we documented Decision Maker(s) consistent with Legal Next of Kin hierarchy.    Content/Action Overview:  Has NO ACP documents/care preferences - information provided, considering goals and options    Patient states he does not agree with the DNR status. I let Dr. Riley know of this and he is now a full code.          Length of Voluntary ACP Conversation in minutes:  <16 minutes (Non-Billable)    ENGLISH H HOSAY

## 2024-01-22 NOTE — PROGRESS NOTES
Speech Pathology:    Chart reviewed in preparation for dysphagia treatment.  Patient NPO for procedure this date and is currently off floor.  SLP intervention deferred.  Note easy to chew diet, thin liquids were recommended.  Will continue to follow.  Thank you.    Michelle Crow, SLP

## 2024-01-22 NOTE — PROCEDURES
Interventional Radiology  Procedure Note        1/22/2024 3:38 PM    Patient: Derick Jane Jr     Informed consent obtained    Diagnosis: central biliary obstruction    Procedure(s):   - Transhepatic cholangiogram confirms obstruction of the upper CBD at the confluence of the right and left hepatic ducts  - The obstructed right sided moieties are confluent  - Right and left percutaneous transhepatic biliary drains, both internal/extermal 10Fr drains into duodenum  - External bag drainage    Estimated blood loss: less than 5cc    Anesthesia: general anesthesia    Specimens removed:  10cc mcwilliams bile from left drain    Complications: None    Implants: None    Primary Physician: Hipolito Estes MD    Recommendations: Can do capping trial starting tomorrow; followup with GI For potential bilateral internal stents; not a good candidate for percutaneous biliary stents due to the high level of obstruction, would require two separate 10mm stents    Full dictated report to follow    Hipolito Estes MD  Interventional Radiology  [facility]  3:38 PM, 1/22/2024

## 2024-01-22 NOTE — PROGRESS NOTES
Occupational Therapy  Medical record reviewed.  Pt has recently returned to room after being off the floor for biliary obstruction procedure.  Nurse concurs to defer OT at this time.  Will continue to follow.

## 2024-01-22 NOTE — PLAN OF CARE
Problem: Pain  Goal: Verbalizes/displays adequate comfort level or baseline comfort level  Outcome: Progressing     Problem: Safety - Adult  Goal: Free from fall injury  Outcome: Progressing     Problem: Discharge Planning  Goal: Discharge to home or other facility with appropriate resources  Outcome: Progressing  Flowsheets (Taken 1/22/2024 1879)  Discharge to home or other facility with appropriate resources:   Arrange for needed discharge resources and transportation as appropriate   Identify barriers to discharge with patient and caregiver   Identify discharge learning needs (meds, wound care, etc)   Refer to discharge planning if patient needs post-hospital services based on physician order or complex needs related to functional status, cognitive ability or social support system     Problem: Skin/Tissue Integrity  Goal: Absence of new skin breakdown  Description: 1.  Monitor for areas of redness and/or skin breakdown  2.  Assess vascular access sites hourly  3.  Every 4-6 hours minimum:  Change oxygen saturation probe site  4.  Every 4-6 hours:  If on nasal continuous positive airway pressure, respiratory therapy assess nares and determine need for appliance change or resting period.  Outcome: Progressing

## 2024-01-22 NOTE — ANESTHESIA PRE PROCEDURE
Department of Anesthesiology  Preprocedure Note       Name:  Derick Jane Jr   Age:  79 y.o.  :  1945                                          MRN:  [id number]         Date:  2024      Surgeon: * No surgeons listed *    Procedure: * No procedures listed *    Medications prior to admission:   Prior to Admission medications    Medication Sig Start Date End Date Taking? Authorizing Provider   apixaban (ELIQUIS) 5 MG TABS tablet Take by mouth 2 times daily   Yes Anuj Heard MD   latanoprost (XALATAN) 0.005 % ophthalmic solution Place 1 drop into both eyes nightly   Yes Anuj Heard MD   midodrine (PROAMATINE) 2.5 MG tablet Take 1 tablet by mouth daily as needed (hypotension)   Yes Anuj Heard MD   Coenzyme Q10 (COQ-10) 100 MG CAPS Take 200 mg by mouth daily   Yes Anuj Heard MD   Folic Acid-Vit B6-Vit B12 (HOMOCYSTEINE FORMULA) 0.8-50-0.1 MG TABS Take 2 tablets by mouth daily   Yes Anuj Heard MD   Ginkgo Biloba (GINKOBA PO) Take 320 mg by mouth daily   Yes ProviderAnuj MD   GLUTATHIONE PO Take 200 mg by mouth daily   Yes Anuj Heard MD   Omega-3 Fatty Acids (FISH OIL) 1000 MG capsule Take by mouth daily   Yes Anuj Heard MD   Resveratrol 100 MG CAPS Take by mouth   Yes Anuj Heard MD   carbidopa-levodopa (SINEMET)  MG per tablet Take 1 tablet by mouth every 4 hours 22  Yes Automatic Reconciliation, Ar   vitamin D3 (CHOLECALCIFEROL) 125 MCG (5000 UT) TABS tablet Take by mouth Twice a Week   Yes Automatic Reconciliation, Ar   cyanocobalamin 500 MCG tablet Take by mouth daily   Yes Automatic Reconciliation, Ar       Current medications:    Current Facility-Administered Medications   Medication Dose Route Frequency Provider Last Rate Last Admin   • heparin 2 units/mL solution in 0.9% sodium chloride  800 mL Irrigation Once Hipolito Estes MD       • lidocaine 2 % injection 20 mL  20 mL IntraDERmal Once Meera

## 2024-01-22 NOTE — H&P
Radiology History and Physical    Patient: Derick Jane Jr 79 y.o. male       Chief Complaint: Hypertension (Patient arrives via EMS from home for hypertension and not feeling well x1 day. Per caregiver patient's BP was 231/197, 173/68 with EMS. Patient arrives on 2L for shortness of breath however SPO2 normal with room air while triaged, states no longer feeling short of breath. Recent PE, jaundice xweeks per EMS. 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) 11/19/2009    4/3/17 followed by PCP no cardiologist    Parkinson disease     Prostatism 11/19/2009    Skin cancer      Family History   Problem Relation Age of Onset    Cancer Mother         colon    Parkinson's Disease Father      Social History     Socioeconomic History    Marital status:      Spouse name: Not on file    Number of children: Not on file    Years of education: Not on file    Highest education level: Not on file   Occupational History    Not on file   Tobacco Use    Smoking status: Former    Smokeless tobacco: Never   Substance and Sexual Activity    Alcohol use: Yes    Drug use: No    Sexual activity: Not on file   Other Topics Concern    Not on file   Social History Narrative         ** Merged History Encounter **     Social Determinants of Health     Financial Resource Strain: Not on file   Food Insecurity: No Food Insecurity (1/21/2024)    Hunger Vital Sign     Worried About Running Out of Food in the Last Year: Never true     Ran Out of Food in the Last Year: Never true   Transportation Needs: No Transportation Needs (1/21/2024)    PRAPARE - Transportation     Lack of Transportation (Medical): No     Lack of Transportation (Non-Medical): No   Physical Activity: Not on file   Stress: Not on file   Social Connections: Not on file   Intimate Partner Violence:  Daily    alcohol 62% (NOZIN) nasal  1 ampule  1 ampule Nasal BID        Physical Exam:  Blood pressure 128/79, pulse 64, temperature 98.8 °F (37.1 °C), temperature source Oral, resp. rate 18, height 1.829 m (6'), weight 71.6 kg (157 lb 13.6 oz), SpO2 98 %.  GENERAL: cooperative, responds appropriately, but somewhat sluggish. Jaundice, icterus.  LUNG: Nonlabored respiration on room air  HEART: regular rate and rhythm    ASA 3  Mallampati 3    Alerts:      Laboratory:      Recent Labs     01/22/24  0508   HGB 9.6*   HCT 28.3*   WBC 13.2*      BUN 23*   K 3.9         Plan of Care/Planned Procedure:  Risks, benefits, and alternatives reviewed with patient's wife and she agrees to proceed with the procedure.     Percutaneous cholangiogram with biliary drain placement, probably multiple biliary drains due to multiple separate obstructed moieties.    General anesthesia    Hipolito Estes MD  Interventional Radiology  [facility]  11:14 AM, 1/22/2024

## 2024-01-22 NOTE — FLOWSHEET NOTE
01/22/24 1410   Handoff   Communication Given Periop Handoff/Relief   Handoff phase Phase I relief   Handoff Given To Mateo La RN   Handoff Received From Marina Gonzalez RN   Handoff Communication Face to Face;At bedside   Time Handoff Given 1410

## 2024-01-23 LAB
ALBUMIN SERPL-MCNC: 1.5 G/DL (ref 3.5–5)
ALBUMIN/GLOB SERPL: 0.4 (ref 1.1–2.2)
ALP SERPL-CCNC: 749 U/L (ref 45–117)
ALT SERPL-CCNC: 42 U/L (ref 12–78)
ANION GAP SERPL CALC-SCNC: 4 MMOL/L (ref 5–15)
AST SERPL-CCNC: 194 U/L (ref 15–37)
BILIRUB SERPL-MCNC: 7.4 MG/DL (ref 0.2–1)
BUN SERPL-MCNC: 31 MG/DL (ref 6–20)
BUN/CREAT SERPL: 34 (ref 12–20)
CALCIUM SERPL-MCNC: 8.7 MG/DL (ref 8.5–10.1)
CHLORIDE SERPL-SCNC: 106 MMOL/L (ref 97–108)
CO2 SERPL-SCNC: 24 MMOL/L (ref 21–32)
CREAT SERPL-MCNC: 0.9 MG/DL (ref 0.7–1.3)
ERYTHROCYTE [DISTWIDTH] IN BLOOD BY AUTOMATED COUNT: 16.4 % (ref 11.5–14.5)
GLOBULIN SER CALC-MCNC: 3.9 G/DL (ref 2–4)
GLUCOSE SERPL-MCNC: 114 MG/DL (ref 65–100)
HCT VFR BLD AUTO: 30.4 % (ref 36.6–50.3)
HGB BLD-MCNC: 10.2 G/DL (ref 12.1–17)
MAGNESIUM SERPL-MCNC: 2 MG/DL (ref 1.6–2.4)
MCH RBC QN AUTO: 33.3 PG (ref 26–34)
MCHC RBC AUTO-ENTMCNC: 33.6 G/DL (ref 30–36.5)
MCV RBC AUTO: 99.3 FL (ref 80–99)
NRBC # BLD: 0 K/UL (ref 0–0.01)
NRBC BLD-RTO: 0 PER 100 WBC
PHOSPHATE SERPL-MCNC: 4.4 MG/DL (ref 2.6–4.7)
PLATELET # BLD AUTO: 296 K/UL (ref 150–400)
PMV BLD AUTO: 10.9 FL (ref 8.9–12.9)
POTASSIUM SERPL-SCNC: 4.3 MMOL/L (ref 3.5–5.1)
PROT SERPL-MCNC: 5.4 G/DL (ref 6.4–8.2)
RBC # BLD AUTO: 3.06 M/UL (ref 4.1–5.7)
SODIUM SERPL-SCNC: 134 MMOL/L (ref 136–145)
WBC # BLD AUTO: 14.9 K/UL (ref 4.1–11.1)

## 2024-01-23 PROCEDURE — 83735 ASSAY OF MAGNESIUM: CPT

## 2024-01-23 PROCEDURE — 2060000000 HC ICU INTERMEDIATE R&B

## 2024-01-23 PROCEDURE — 85027 COMPLETE CBC AUTOMATED: CPT

## 2024-01-23 PROCEDURE — 6370000000 HC RX 637 (ALT 250 FOR IP): Performed by: STUDENT IN AN ORGANIZED HEALTH CARE EDUCATION/TRAINING PROGRAM

## 2024-01-23 PROCEDURE — 99233 SBSQ HOSP IP/OBS HIGH 50: CPT | Performed by: NURSE PRACTITIONER

## 2024-01-23 PROCEDURE — 6370000000 HC RX 637 (ALT 250 FOR IP): Performed by: INTERNAL MEDICINE

## 2024-01-23 PROCEDURE — 2580000003 HC RX 258: Performed by: STUDENT IN AN ORGANIZED HEALTH CARE EDUCATION/TRAINING PROGRAM

## 2024-01-23 PROCEDURE — 80053 COMPREHEN METABOLIC PANEL: CPT

## 2024-01-23 PROCEDURE — 6360000002 HC RX W HCPCS: Performed by: STUDENT IN AN ORGANIZED HEALTH CARE EDUCATION/TRAINING PROGRAM

## 2024-01-23 PROCEDURE — 84100 ASSAY OF PHOSPHORUS: CPT

## 2024-01-23 PROCEDURE — 6360000002 HC RX W HCPCS: Performed by: INTERNAL MEDICINE

## 2024-01-23 PROCEDURE — 2580000003 HC RX 258: Performed by: INTERNAL MEDICINE

## 2024-01-23 PROCEDURE — 36415 COLL VENOUS BLD VENIPUNCTURE: CPT

## 2024-01-23 PROCEDURE — 97530 THERAPEUTIC ACTIVITIES: CPT

## 2024-01-23 PROCEDURE — 97535 SELF CARE MNGMENT TRAINING: CPT | Performed by: OCCUPATIONAL THERAPIST

## 2024-01-23 RX ADMIN — LATANOPROST 1 DROP: 50 SOLUTION OPHTHALMIC at 20:23

## 2024-01-23 RX ADMIN — CARBIDOPA AND LEVODOPA 1 TABLET: 25; 100 TABLET ORAL at 08:51

## 2024-01-23 RX ADMIN — Medication 1 AMPULE: at 20:30

## 2024-01-23 RX ADMIN — CARBIDOPA AND LEVODOPA 1 TABLET: 25; 100 TABLET ORAL at 12:21

## 2024-01-23 RX ADMIN — Medication 1 AMPULE: at 12:19

## 2024-01-23 RX ADMIN — SODIUM CHLORIDE, PRESERVATIVE FREE 20 ML: 5 INJECTION INTRAVENOUS at 20:11

## 2024-01-23 RX ADMIN — WATER 2000 MG: 1 INJECTION INTRAMUSCULAR; INTRAVENOUS; SUBCUTANEOUS at 12:19

## 2024-01-23 RX ADMIN — PIPERACILLIN AND TAZOBACTAM 3375 MG: 3; .375 INJECTION, POWDER, FOR SOLUTION INTRAVENOUS at 08:30

## 2024-01-23 RX ADMIN — APIXABAN 5 MG: 5 TABLET, FILM COATED ORAL at 20:11

## 2024-01-23 RX ADMIN — MIDODRINE HYDROCHLORIDE 5 MG: 5 TABLET ORAL at 08:51

## 2024-01-23 RX ADMIN — WATER 2000 MG: 1 INJECTION INTRAMUSCULAR; INTRAVENOUS; SUBCUTANEOUS at 20:10

## 2024-01-23 RX ADMIN — PIPERACILLIN AND TAZOBACTAM 3375 MG: 3; .375 INJECTION, POWDER, FOR SOLUTION INTRAVENOUS at 00:45

## 2024-01-23 RX ADMIN — MIDODRINE HYDROCHLORIDE 5 MG: 5 TABLET ORAL at 16:54

## 2024-01-23 RX ADMIN — SODIUM CHLORIDE: 9 INJECTION, SOLUTION INTRAVENOUS at 00:44

## 2024-01-23 RX ADMIN — SODIUM CHLORIDE, PRESERVATIVE FREE 10 ML: 5 INJECTION INTRAVENOUS at 08:52

## 2024-01-23 RX ADMIN — CARBIDOPA AND LEVODOPA 1 TABLET: 25; 100 TABLET ORAL at 16:53

## 2024-01-23 ASSESSMENT — ENCOUNTER SYMPTOMS
SHORTNESS OF BREATH: 1
ABDOMINAL PAIN: 0
NAUSEA: 0
COUGH: 0
VOMITING: 0
COLOR CHANGE: 0
TROUBLE SWALLOWING: 0
BACK PAIN: 0
DIARRHEA: 0

## 2024-01-23 NOTE — PROGRESS NOTES
Hospitalist Progress Note    NAME:   Derick Jane Jr   : 1945   MRN: [id number]     Date/Time: 2024 7:33 AM  Patient PCP: Brian Nur MD    Estimated discharge date: 1-2 days  Barriers: PT OT recommending SNF, improvement in liver function, heme-onc clearance      Assessment / Plan:  Sepsis  Klebsiella oxytoca bacteremia  Possible ascending cholangitis  Candidal esophagitis  Leukocytosis  Admit to stepdown unit  Continue empiric vancomycin and Zosyn  -Pharmacy consulted for vancomycin dosing  Follow cultures  De-escalate antibiotics as appropriate  Status post IV fluids  : Patient started on oral Diflucan for candidal esophagitis from last night.  Blood culture is growing Klebsiella oxytoca.  Continue with IV Zosyn.  DC vancomycin.  Will repeat blood cultures tomorrow morning.  White count is trending down at 16.6.  : He repeat blood cultures have been drawn this morning.  White count is trending down at 14.7.   : White count trending down at 13.2.  Repeat blood cultures are negative so far.  : White count up at 14.9.  The patient's blood culture has been negative since .  Will switch antibiotics to cefazolin that covers for Klebsiella.     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  Abnormal LFT  Anasarca secondary to above  Precipitous functional decline  Trend hepatic function panel  Gastroenterology consulted, greatly appreciate their expertise  Oncology consulted, greatly appreciate their expertise  Palliative care consulted, greatly appreciate their expertise     Summary of oncologic workup from [facility]:  Admitted -  On admission , ALT 47, alkaline phosphatase 1129, total bilirubin 18 (direct bilirubin 13) lipase WNL  Ultrasound and CT abdomen pelvis demonstrating intrahepatic biliary ductal dilatation with abrupt tapering of common hepatic duct worrisome for extrinsic compression  MRCP redemonstrated intrahepatic biliary ductal  reviewed labs: CBC, BMP, LFT, magnesium, phosphorus, blood cultures  I personally reviewed imaging:  I personally reviewed EKG:  Toxic drug monitoring:   Discussed case with: Patient, RN, wife at the bedside        Code Status: Patient rescinded his DNR and is a full code now.  DVT Prophylaxis: Eliquis  GI Prophylaxis: Not indicated  Baseline: Debilitated  DPOA is son Sohan [phone].    Subjective:     Chief Complaint / Reason for Physician Visit  \" Follow-up for sepsis, hyperbilirubinemia in the setting of possible cholangiocarcinoma, anasarca, hypoglycemia, precipitous functional decline, advanced Parkinson's disease, autonomic instability with hypotension, hyponatremia, pulmonary embolism on Eliquis, recent COVID-19 infection, glaucoma.\".  Discussed with RN events overnight.       Objective:     VITALS:   Last 24hrs VS reviewed since prior progress note. Most recent are:  Patient Vitals for the past 24 hrs:   BP Temp Temp src Pulse Resp SpO2   01/23/24 0300 113/70 99 °F (37.2 °C) Oral 66 14 90 %   01/23/24 0230 100/66 -- -- 65 13 (!) 89 %   01/22/24 2332 101/66 97.4 °F (36.3 °C) Axillary 69 15 90 %   01/22/24 1930 (!) 92/58 97.6 °F (36.4 °C) Axillary 66 13 --   01/22/24 1915 (!) 83/52 -- -- 71 14 --   01/22/24 1900 (!) 85/58 -- -- 71 15 --   01/22/24 1743 -- -- -- 67 -- 99 %   01/22/24 1740 -- -- -- 66 16 100 %   01/22/24 1732 103/70 -- -- 68 20 100 %   01/22/24 1702 104/69 -- -- 60 15 100 %   01/22/24 1500 -- -- -- -- -- 98 %   01/22/24 1448 118/72 97.8 °F (36.6 °C) Axillary 59 23 99 %   01/22/24 1430 112/65 97.7 °F (36.5 °C) -- 59 17 93 %   01/22/24 1415 114/69 97.7 °F (36.5 °C) Oral 57 18 93 %   01/22/24 1400 110/72 -- -- 58 19 93 %   01/22/24 1355 110/71 -- -- 57 18 94 %   01/22/24 1350 106/69 -- -- 57 18 93 %   01/22/24 1345 110/67 -- -- 55 19 93 %   01/22/24 1340 107/70 -- -- 57 18 93 %   01/22/24 1335 106/72 -- -- 56 18 93 %   01/22/24 1332 109/74 97.6 °F (36.4 °C) Oral 56 18 93 %   01/22/24 1330

## 2024-01-23 NOTE — PROGRESS NOTES
Palliative Medicine  Patient Name: Derick Jane Jr  YOB: 1945  MRN: [id number]  Age: 79 y.o.  Gender: male    Date of Initial Consult: 1/18/2024  Date of Service: 1/23/24  Time: 8:45 AM  Provider: SABINO Flores NP  Hospital Day: 7  Admit Date: 1/18/2024  Referring Provider: Dr. Fish and Dr. Peña       Reasons for Consultation:  Goals of Care    HISTORY OF PRESENT ILLNESS (HPI):   Derick Jane Jr is a 79 y.o. male with Parkinson's disease, recent PE, recent hospitalization at [facility] and found to have suspected cholangiocarcinoma s/p two biliary stents who was admitted on 1/18/2024 from home with a diagnosis of sepsis and hyperbilirubinemia. Per GI he presents with findings compatible with acute cholangitis. He is receiving IV antibiotics. He initially had hypotension, but this has improved. GI is planning to remove the migrated CBD stents today with EGD followed by PTC with IR placed stenting in the next few days after eliquis washout. Oncology has been consulted.    1/19/2024 CT A/P:  IMPRESSION:  1. CT of the chest demonstrates bilateral effusions right greater than left and  bibasilar atelectasis.  2. CT of the abdomen and pelvis demonstrates intrahepatic ductal dilatation  which can be followed to the liver hilum where there is soft tissue density  which may represent hilar cholangiocarcinoma. A biliary stent is distal to the  level of obstruction. Results called to the nurse practitioner and GI.  3. There is small amount of ascites. There is anasarca.  4. Numerous compression fractures thoracic lumbar spine. Comparison with prior  studies would be helpful.    Psychosocial: Mr. Jane is retired from [employer] work. He and his wife Zulema have been together for 60 years, and  for 57; Zulema Fry is a retired . They have one grown son, Sohan.who is an . He has a \"sitter,\" Martha, from [employer] who stays with him and helps him during the  self-care [3]    Modified ESAS:  Modified-[place] Symptom Assessment Scale (ESAS)  Drowsiness Score: 5  Depression Score: Not depressed  Pain Score: No pain  Anxiety Score: Not anxious  Nausea Score: Not nauseated  Dyspnea Score: No shortness of breath    Clinical Pain Assessment (nonverbal scale for severity on nonverbal patients):   Clinical Pain Assessment  Severity: 0       NVPS:  Adult Nonverbal Pain Scale (NVPS)  Face: No particular expression or smile  Activity (Movement): Laid quietly, normal position  Guarding: Lying quietly, no positioning of hands over areas of bod    RDOS:  RDOS  Restlessness:non-purposeful: None  Accessory muscle use: rise in clavicle during inspiration: None  Look of fear: None  Total : 0      Vital Signs: Blood pressure 107/67, pulse 59, temperature 98 °F (36.7 °C), temperature source Oral, resp. rate 15, height 1.829 m (6'), weight 71.6 kg (157 lb 13.6 oz), SpO2 92 %.    PHYSICAL ASSESSMENT:   General: [] Oriented x3  [] Well appearing  [] Intubated  []Ill appearing  [x]Other: sitting up in hospital bed, NAD, +jaundice, participates in conversation but very hard to hear him and he is fixated on his NPO status  Mental Status: [] Normal mental status exam  [x] Drowsy  [] Confused  []Other:  Cardiovascular: [x] Regular rate/rhythm  [] Arrhythmia  [] Other:  Chest: [x] Effort normal  []Lungs clear  [] Respiratory distress  []Tachypnea  [] Other:  Abdomen: [] Soft/non-tender  [x] Normal appearance  [] Distended  [] Ascites  [] Other:  Neurological: [] Normal speech  [] Normal sensation  [x]Deficits present: whispering, no voice  Extremity: [] Normal skin color/temp  [] Clubbing/cyanosis  [] No edema  [] Other:    Wt Readings from Last 15 Encounters:   01/18/24 71.6 kg (157 lb 13.6 oz)        Current Diet: ADULT DIET; Easy to Chew  DIET ONE TIME MESSAGE;  ADULT ORAL NUTRITION SUPPLEMENT; Breakfast, Lunch, Dinner; Standard High Calorie/High Protein Oral Supplement

## 2024-01-23 NOTE — PROGRESS NOTES
0700 Bedside shift report received from DAGMAR Qiu. Report included the following information Nurse Handoff Report, MAR, Telemetry status, Recent Results, and plan of care. This RN verbalized understanding of plan of care with opportunity for clarification and questions. Care of patient assumed at this time. Dual skin check performed at this time.     Patient refused afternoon dose of midodrine today. Education provided on taking midodrine as prescribed for maintenance of BP; patient continues to need reinforcement.     1900 Bedside shift report given to DAGMAR Qiu. Report included the following information Nurse Handoff Report, MAR, Telemetry status, Recent Results, and plan of care. Oncoming RN verbalized understanding of plan of care with opportunity for clarification and questions. Dual skin check performed at this time.       I have overseen Keisha Kapoor RN from 7am-7pm. During this time I have directly observed her patient care skills; medical knowledge and reasoning; and communication/ professionalism. I agree with the assessments, medication administration, and flow sheet charting provided by Keisha Kapoor RN.

## 2024-01-23 NOTE — PROGRESS NOTES
End of Shift Note    Bedside shift change report given to DAGMAR Qiu (oncoming nurse) by Keisha Kapoor RN (offgoing nurse).  Report included the following information SBAR    Shift worked:  7a-7p     Shift summary and any significant changes:     Patient refusing midodrine; Education provided to patient the importance of the medication and maintaining stable BP.      Concerns for physician to address:  Medication nonadherence. Also made d/c plan?      Zone phone for oncoming shift:   N/a         Skin:     Interventions: float heels, PT/OT consult, and internal/external urinary devices    Patient Safety:  Interventions: bed/chair alarm, assistive device (walker, cane. etc), gripper socks, pt to call before getting OOB, stay with me (per policy), and gait belt       Length of Stay:  Expected LOS: 7  Actual LOS: 4      Keisha Kapoor RN

## 2024-01-23 NOTE — PLAN OF CARE
Problem: Pain  Goal: Verbalizes/displays adequate comfort level or baseline comfort level  1/22/2024 2011 by Uyen Siddiqui RN  Outcome: Progressing  Flowsheets (Taken 1/22/2024 1930)  Verbalizes/displays adequate comfort level or baseline comfort level:   Encourage patient to monitor pain and request assistance   Assess pain using appropriate pain scale   Administer analgesics based on type and severity of pain and evaluate response  1/22/2024 1308 by Araceli Reeder RN  Outcome: Progressing     Problem: Safety - Adult  Goal: Free from fall injury  1/22/2024 2011 by Uyen Siddiqui RN  Outcome: Progressing  1/22/2024 1308 by Araceli Reeder RN  Outcome: Progressing     Problem: Discharge Planning  Goal: Discharge to home or other facility with appropriate resources  1/22/2024 2011 by Uyen Siddiqui RN  Outcome: Progressing  1/22/2024 1308 by Araceli Reeder RN  Outcome: Progressing  Flowsheets  Taken 1/22/2024 0750 by Keisha Kapoor RN  Discharge to home or other facility with appropriate resources:   Identify barriers to discharge with patient and caregiver   Arrange for needed discharge resources and transportation as appropriate   Identify discharge learning needs (meds, wound care, etc)   Arrange for interpreters to assist at discharge as needed  Taken 1/22/2024 0744 by Araceli Reeder RN  Discharge to home or other facility with appropriate resources:   Arrange for needed discharge resources and transportation as appropriate   Identify barriers to discharge with patient and caregiver   Identify discharge learning needs (meds, wound care, etc)   Refer to discharge planning if patient needs post-hospital services based on physician order or complex needs related to functional status, cognitive ability or social support system     Problem: Skin/Tissue Integrity  Goal: Absence of new skin breakdown  Description: 1.  Monitor for areas of redness and/or skin breakdown  2.  Assess vascular access sites hourly  3.

## 2024-01-23 NOTE — PLAN OF CARE
Problem: Physical Therapy - Adult  Goal: By Discharge: Performs mobility at highest level of function for planned discharge setting.  See evaluation for individualized goals.  Description: FUNCTIONAL STATUS PRIOR TO ADMISSION: Pt lives with his wife at baseline, she has multiple health issues and is unable to assist. Pt mentions caregivers as well but is unsure of hours. He has recently been at [facility] and d/c'd 1/9/24 and was noted to be at a CGA to SBA level of function, uses rolling walker. He has a hx of Parkinson's.     HOME SUPPORT PRIOR TO ADMISSION: The patient lived with wife, some caregiver support.    Physical Therapy Goals  Initiated 1/19/2024  1.  Patient will move from supine to sit and sit to supine, scoot up and down, and roll side to side in bed with minimal assistance within 7 day(s).    2.  Patient will perform sit to stand with minimal assistance within 7 day(s).  3.  Patient will transfer from bed to chair and chair to bed with minimal assistance using the least restrictive device within 7 day(s).  4.  Patient will ambulate with minimal assistance for 25 feet with the least restrictive device within 7 day(s).   5.  Patient will ascend/descend 4 stairs with handrail(s) with minimal assistance within 7 day(s). (Note: pt does have 2 story home with bed on second level, will need to reset goal as approp)    Outcome: Not Progressing  PHYSICAL THERAPY TREATMENT    Patient: Derick Jane Jr (79 y.o. 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 He is post-op day 1 bilateral bilary drain placement. Patient is received supine in bed

## 2024-01-23 NOTE — CARE COORDINATION
CM Note: I spoke with patient and let him know of the recommendation of PT for him to go to [facility]. He does not want to go to [facility] but want  to go home.    English Stefano LEAVITT CM   [phone]

## 2024-01-23 NOTE — PLAN OF CARE
Problem: Pain  Goal: Verbalizes/displays adequate comfort level or baseline comfort level  Outcome: Progressing     Problem: Safety - Adult  Goal: Free from fall injury  Outcome: Progressing     Problem: Discharge Planning  Goal: Discharge to home or other facility with appropriate resources  Outcome: Progressing     Problem: Skin/Tissue Integrity  Goal: Absence of new skin breakdown  Description: 1.  Monitor for areas of redness and/or skin breakdown  2.  Assess vascular access sites hourly  3.  Every 4-6 hours minimum:  Change oxygen saturation probe site  4.  Every 4-6 hours:  If on nasal continuous positive airway pressure, respiratory therapy assess nares and determine need for appliance change or resting period.  Outcome: Progressing

## 2024-01-23 NOTE — PLAN OF CARE
Problem: Occupational Therapy - Adult  Goal: By Discharge: Performs self-care activities at highest level of function for planned discharge setting.  See evaluation for individualized goals.  Description: FUNCTIONAL STATUS PRIOR TO ADMISSION:     , ADL Assistance: Needs assistance,  ,  ,  ,  ,  ,  , Ambulation Assistance: Needs assistance, Transfer Assistance: Needs assistance (pt reports that he has a caregiver full time and therapy at home),   Pt lives with his wife    HOME SUPPORT: Patient lived with wife.  Has a caregiver and was receiving HH therapy.    Occupational Therapy Goals:  Initiated 1/19/2024  1.  Patient will perform grooming in standing with Minimal Assist within 7 day(s).  2.  Patient will perform bathing with Moderate Assist within 7 day(s).  3.  Patient will perform lower body adls with Moderate Assist within 7 day(s).  4.  Patient will perform toilet transfers with Moderate Assist  within 7 day(s).  5.  Patient will perform all aspects of toileting with Moderate Assist within 7 day(s).  6.  Patient will participate in upper extremity therapeutic exercise/activities with Contact Guard Assist for 5 minutes within 7 day(s).    Outcome: Not Progressing   OCCUPATIONAL THERAPY TREATMENT  Patient: Derick Jane Jr (79 y.o. 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    ASSESSMENT  Patient continues to benefit from skilled OT services and is not progressing towards goals. Pt was agreeable to treatment, his voice was very soft and weak sounding--hardly able to discern his communication.  Nursing was present to provide BP medication, but he declined.  /68

## 2024-01-24 LAB
ALBUMIN SERPL-MCNC: 1.6 G/DL (ref 3.5–5)
ALBUMIN/GLOB SERPL: 0.4 (ref 1.1–2.2)
ALP SERPL-CCNC: 589 U/L (ref 45–117)
ALT SERPL-CCNC: 45 U/L (ref 12–78)
ANION GAP SERPL CALC-SCNC: 5 MMOL/L (ref 5–15)
AST SERPL-CCNC: 120 U/L (ref 15–37)
BACTERIA SPEC CULT: NORMAL
BILIRUB SERPL-MCNC: 5.5 MG/DL (ref 0.2–1)
BUN SERPL-MCNC: 38 MG/DL (ref 6–20)
BUN/CREAT SERPL: 37 (ref 12–20)
CALCIUM SERPL-MCNC: 8.8 MG/DL (ref 8.5–10.1)
CHLORIDE SERPL-SCNC: 107 MMOL/L (ref 97–108)
CO2 SERPL-SCNC: 25 MMOL/L (ref 21–32)
CREAT SERPL-MCNC: 1.04 MG/DL (ref 0.7–1.3)
ERYTHROCYTE [DISTWIDTH] IN BLOOD BY AUTOMATED COUNT: 16.5 % (ref 11.5–14.5)
GLOBULIN SER CALC-MCNC: 3.8 G/DL (ref 2–4)
GLUCOSE SERPL-MCNC: 85 MG/DL (ref 65–100)
HCT VFR BLD AUTO: 29.3 % (ref 36.6–50.3)
HGB BLD-MCNC: 9.8 G/DL (ref 12.1–17)
MAGNESIUM SERPL-MCNC: 2.1 MG/DL (ref 1.6–2.4)
MCH RBC QN AUTO: 33.9 PG (ref 26–34)
MCHC RBC AUTO-ENTMCNC: 33.4 G/DL (ref 30–36.5)
MCV RBC AUTO: 101.4 FL (ref 80–99)
NRBC # BLD: 0 K/UL (ref 0–0.01)
NRBC BLD-RTO: 0 PER 100 WBC
PHOSPHATE SERPL-MCNC: 2.9 MG/DL (ref 2.6–4.7)
PLATELET # BLD AUTO: 286 K/UL (ref 150–400)
PMV BLD AUTO: 10.6 FL (ref 8.9–12.9)
POTASSIUM SERPL-SCNC: 4 MMOL/L (ref 3.5–5.1)
PROT SERPL-MCNC: 5.4 G/DL (ref 6.4–8.2)
RBC # BLD AUTO: 2.89 M/UL (ref 4.1–5.7)
SERVICE CMNT-IMP: NORMAL
SODIUM SERPL-SCNC: 137 MMOL/L (ref 136–145)
WBC # BLD AUTO: 21.6 K/UL (ref 4.1–11.1)

## 2024-01-24 PROCEDURE — 2060000000 HC ICU INTERMEDIATE R&B

## 2024-01-24 PROCEDURE — 92526 ORAL FUNCTION THERAPY: CPT

## 2024-01-24 PROCEDURE — 36415 COLL VENOUS BLD VENIPUNCTURE: CPT

## 2024-01-24 PROCEDURE — 2580000003 HC RX 258: Performed by: INTERNAL MEDICINE

## 2024-01-24 PROCEDURE — 99232 SBSQ HOSP IP/OBS MODERATE 35: CPT | Performed by: NURSE PRACTITIONER

## 2024-01-24 PROCEDURE — 83735 ASSAY OF MAGNESIUM: CPT

## 2024-01-24 PROCEDURE — 85027 COMPLETE CBC AUTOMATED: CPT

## 2024-01-24 PROCEDURE — 80053 COMPREHEN METABOLIC PANEL: CPT

## 2024-01-24 PROCEDURE — 6370000000 HC RX 637 (ALT 250 FOR IP): Performed by: STUDENT IN AN ORGANIZED HEALTH CARE EDUCATION/TRAINING PROGRAM

## 2024-01-24 PROCEDURE — 84100 ASSAY OF PHOSPHORUS: CPT

## 2024-01-24 PROCEDURE — 6370000000 HC RX 637 (ALT 250 FOR IP): Performed by: INTERNAL MEDICINE

## 2024-01-24 PROCEDURE — 2580000003 HC RX 258: Performed by: GENERAL ACUTE CARE HOSPITAL

## 2024-01-24 PROCEDURE — 6360000002 HC RX W HCPCS: Performed by: GENERAL ACUTE CARE HOSPITAL

## 2024-01-24 PROCEDURE — 6360000002 HC RX W HCPCS: Performed by: INTERNAL MEDICINE

## 2024-01-24 PROCEDURE — 87040 BLOOD CULTURE FOR BACTERIA: CPT

## 2024-01-24 RX ADMIN — ACETAMINOPHEN 650 MG: 325 TABLET ORAL at 08:05

## 2024-01-24 RX ADMIN — MIDODRINE HYDROCHLORIDE 5 MG: 5 TABLET ORAL at 16:33

## 2024-01-24 RX ADMIN — CARBIDOPA AND LEVODOPA 1 TABLET: 25; 100 TABLET ORAL at 08:04

## 2024-01-24 RX ADMIN — FLUCONAZOLE 100 MG: 100 TABLET ORAL at 08:04

## 2024-01-24 RX ADMIN — Medication 1 AMPULE: at 21:33

## 2024-01-24 RX ADMIN — WATER 2000 MG: 1 INJECTION INTRAMUSCULAR; INTRAVENOUS; SUBCUTANEOUS at 03:42

## 2024-01-24 RX ADMIN — CARBIDOPA AND LEVODOPA 1 TABLET: 25; 100 TABLET ORAL at 16:33

## 2024-01-24 RX ADMIN — Medication 1 TABLET: at 08:04

## 2024-01-24 RX ADMIN — Medication 1 AMPULE: at 08:05

## 2024-01-24 RX ADMIN — PIPERACILLIN AND TAZOBACTAM 3375 MG: 3; .375 INJECTION, POWDER, FOR SOLUTION INTRAVENOUS; PARENTERAL at 16:33

## 2024-01-24 RX ADMIN — LATANOPROST 1 DROP: 50 SOLUTION OPHTHALMIC at 21:38

## 2024-01-24 RX ADMIN — FOLIC ACID 1 MG: 1 TABLET ORAL at 08:04

## 2024-01-24 RX ADMIN — APIXABAN 5 MG: 5 TABLET, FILM COATED ORAL at 08:04

## 2024-01-24 RX ADMIN — CARBIDOPA AND LEVODOPA 1 TABLET: 25; 100 TABLET ORAL at 11:44

## 2024-01-24 RX ADMIN — Medication 100 MG: at 08:04

## 2024-01-24 RX ADMIN — MIDODRINE HYDROCHLORIDE 5 MG: 5 TABLET ORAL at 08:04

## 2024-01-24 RX ADMIN — CARBIDOPA AND LEVODOPA 1 TABLET: 25; 100 TABLET ORAL at 21:33

## 2024-01-24 RX ADMIN — PIPERACILLIN AND TAZOBACTAM 4500 MG: 4; .5 INJECTION, POWDER, LYOPHILIZED, FOR SOLUTION INTRAVENOUS at 11:55

## 2024-01-24 RX ADMIN — APIXABAN 5 MG: 5 TABLET, FILM COATED ORAL at 21:33

## 2024-01-24 RX ADMIN — MIDODRINE HYDROCHLORIDE 5 MG: 5 TABLET ORAL at 11:44

## 2024-01-24 NOTE — PROGRESS NOTES
0545 Breakthrough drainage noticed on right side biliary drain dressing. Dressing removed and replaced with new split gauze and surgical tape. New dressing dated.

## 2024-01-24 NOTE — PROGRESS NOTES
Hospice Liaison Note: consult received, chart reviewed. Unfortunately, pt lives out of our service area. We will have to defer this consult as pt is desiring home hospice.       Thank you for the opportunity to be of service to this patient and family.         Fabi Melton RN, BSN, PN  Clinical Nurse Liaison  [facility]  [phone] [place]

## 2024-01-24 NOTE — PLAN OF CARE
Problem: Pain  Goal: Verbalizes/displays adequate comfort level or baseline comfort level  1/24/2024 0016 by Uyen Siddiqui RN  Outcome: Progressing  1/23/2024 1240 by Araceli Reeder RN  Outcome: Progressing     Problem: Safety - Adult  Goal: Free from fall injury  1/24/2024 0016 by Uyen Siddiqui RN  Outcome: Progressing  1/23/2024 1240 by Araceli Reeder RN  Outcome: Progressing     Problem: Discharge Planning  Goal: Discharge to home or other facility with appropriate resources  1/24/2024 0016 by Uyen Siddiqui RN  Outcome: Progressing  Flowsheets (Taken 1/23/2024 1915)  Discharge to home or other facility with appropriate resources:   Identify barriers to discharge with patient and caregiver   Arrange for needed discharge resources and transportation as appropriate   Identify discharge learning needs (meds, wound care, etc)  1/23/2024 1240 by Araceli Reeder RN  Outcome: Progressing     Problem: Skin/Tissue Integrity  Goal: Absence of new skin breakdown  Description: 1.  Monitor for areas of redness and/or skin breakdown  2.  Assess vascular access sites hourly  3.  Every 4-6 hours minimum:  Change oxygen saturation probe site  4.  Every 4-6 hours:  If on nasal continuous positive airway pressure, respiratory therapy assess nares and determine need for appliance change or resting period.  1/24/2024 0016 by Uyen Siddiqui RN  Outcome: Progressing  1/23/2024 1240 by Araceli Reeder, RN  Outcome: Progressing

## 2024-01-24 NOTE — PROGRESS NOTES
°F (36.6 °C), temperature source Oral, resp. rate 18, height 1.829 m (6'), weight 71.6 kg (157 lb 13.6 oz), SpO2 98 %.    PHYSICAL ASSESSMENT:   General: [] Oriented x3  [] Well appearing  [] Intubated  []Ill appearing  [x]Other: sitting up in hospital bed, NAD, +jaundice, participates in conversation but very hard to hear him and he is fixated on his NPO status  Mental Status: [] Normal mental status exam  [x] Drowsy  [] Confused  []Other:  Cardiovascular: [x] Regular rate/rhythm  [] Arrhythmia  [] Other:  Chest: [x] Effort normal  []Lungs clear  [] Respiratory distress  []Tachypnea  [] Other:  Abdomen: [] Soft/non-tender  [x] Normal appearance  [] Distended  [] Ascites  [] Other:  Neurological: [] Normal speech  [] Normal sensation  [x]Deficits present: whispering, no voice  Extremity: [] Normal skin color/temp  [] Clubbing/cyanosis  [] No edema  [] Other:    Wt Readings from Last 15 Encounters:   01/18/24 71.6 kg (157 lb 13.6 oz)        Current Diet: ADULT DIET; Easy to Chew  DIET ONE TIME MESSAGE;  ADULT ORAL NUTRITION SUPPLEMENT; Breakfast, Lunch, Dinner; Standard High Calorie/High Protein Oral Supplement       PSYCHOSOCIAL/SPIRITUAL SCREENING:   Palliative IDT has assessed this patient for cultural preferences / practices and a referral made as appropriate to needs (Cultural Services, Patient Advocacy, Ethics, etc.)    Spiritual Affiliation: Yarsani    Any spiritual / Gnosticist concerns:  [] Yes /  [x] No   If \"Yes\" to discuss with pastoral care during IDT     Does caregiver feel burdened by caring for their loved one:   [] Yes /  [] No /  [x] No Caregiver Present/Available [] No Caregiver [] Pt Lives at Facility  If \"Yes\" to discuss with social work during IDT    Anticipatory grief assessment:   [x] Normal  / [] Maladaptive     If \"Maladaptive\" to discuss with social work during IDT    ESAS Anxiety: Anxiety Score: Not anxious    ESAS Depression: Depression Score: Not depressed        LAB AND IMAGING  FINDINGS:   Objective data reviewed:  labs, images, records, medication use, vitals, and chart     FINAL COMMENTS   Thank you for allowing Palliative Medicine to participate in the care of Derick Jane Jr.    Electronically signed by   SABINO Flores - NP  Palliative Care Team  on 1/24/2024 at 3:19 PM

## 2024-01-24 NOTE — PROGRESS NOTES
Palliative Medicine  Patient Name: Derick Jane Jr  YOB: 1945  MRN: [id number]  Age: 79 y.o.  Gender: male    Date of Initial Consult: 1/18/2024  Date of Service: 1/23/24  Time: 3:11 PM  Provider: ZION WhaleyS2  Hospital Day: 7  Admit Date: 1/18/2024  Referring Provider: Dr. Fish and Dr. Peña       Reasons for Consultation:  Goals of Care    HISTORY OF PRESENT ILLNESS (HPI):   Derick Jane Jr is a 79 y.o. male with Parkinson's disease, recent PE, recent hospitalization at [facility] and found to have suspected cholangiocarcinoma s/p two biliary stents who was admitted on 1/18/2024 from home with a diagnosis of sepsis and hyperbilirubinemia. Per GI he presents with findings compatible with acute cholangitis. He is receiving IV antibiotics. He initially had hypotension, but this has improved. GI is planning to remove the migrated CBD stents today with EGD followed by PTC with IR placed stenting in the next few days after eliquis washout. Oncology has been consulted.    1/19/2024 CT A/P:  IMPRESSION:  1. CT of the chest demonstrates bilateral effusions right greater than left and  bibasilar atelectasis.  2. CT of the abdomen and pelvis demonstrates intrahepatic ductal dilatation  which can be followed to the liver hilum where there is soft tissue density  which may represent hilar cholangiocarcinoma. A biliary stent is distal to the  level of obstruction. Results called to the nurse practitioner and GI.  3. There is small amount of ascites. There is anasarca.  4. Numerous compression fractures thoracic lumbar spine. Comparison with prior  studies would be helpful.    Psychosocial: Mr. Jane is retired from [employer] work. He and his wife Zulema have been together for 60 years, and  for 57; Zulema Fry is a retired . They have one grown son, Sohan, who is an . He has a \"sitter,\" Martha, from [employer] who stays with him and helps him during the

## 2024-01-24 NOTE — PLAN OF CARE
Speech LAnguage Pathology TREATMENT    Patient: Derick Jane Jr (79 y.o. 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 Patient received in bed on room air on this date with untouched lunch tray at bedside. Patient initially aphonic upon SLP arrival, however patient able to achieve dysphonic vocal quality when cued. Unclear vocal quality baseline as patient with history of Parkinson's Disease. Patient agreeable to thin liquid and pureed trials on this date. Swallow function is WFL for consistencies trialed. No overt difficulty or overt signs of aspiration. Patient declined solid consistencies on this date.    Continue to recommend diet as outlined below. Noted that hospice consult is pending and patient/family potentially interested in home w/ hospice. Will continue to follow at this time.    Patient will benefit from skilled intervention to address the above impairments.     PLAN :  Recommendations and Planned Interventions:  Diet: Easy to chew and thin liquids  -- Medication as tolerated  -- Routine oral care, as able  -- 1:1 assistance with PO intake, as needed  -- Aspiration precautions: upright for all PO, small bites/sips, slow rate of intake       Acute SLP Services: Yes, SLP will continue to follow per plan of care.    Discharge Recommendations: Continue to assess pending progress     SUBJECTIVE:   Patient requested applesauce when presented with options.    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.0) 11/19/2009    4/3/17 followed by PCP no cardiologist    Parkinson disease     Prostatism 11/19/2009    Skin cancer      Past  care including recommendations, planned interventions, and recommended diet changes were discussed with: Registered nurse    Patient/family have participated as able in goal setting and plan of care and Patient/family agree to work toward stated goals and plan of care    Thank you,  ASHISH Vizcarra  Minutes: 16     Problem: SLP Adult - Impaired Swallowing  Goal: By Discharge: Advance to least restrictive diet without signs or symptoms of aspiration for planned discharge setting.  See evaluation for individualized goals.  Description: 1/20/2024  Speech path goals  1. Patient will tolerate least restrictive diet with no overt s/s of aspiration.   Outcome: Not Progressing

## 2024-01-24 NOTE — PROGRESS NOTES
Comprehensive Nutrition Assessment    Type and Reason for Visit:  Reassess    Nutrition Recommendations/Plan:   Continue current diet and supplements  Please document % meals and supplements consumed in flowsheet I/O's under intake      Malnutrition Assessment:  Malnutrition Status:  Severe malnutrition (01/24/24 1613)    Context:  Chronic Illness     Findings of the 6 clinical characteristics of malnutrition:  Energy Intake:  Mild decrease in energy intake (Comment)  Weight Loss:  No significant weight loss     Body Fat Loss:  Severe body fat loss Orbital, Triceps, Buccal region   Muscle Mass Loss:  Severe muscle mass loss Temples (temporalis), Clavicles (pectoralis & deltoids), Hand (interosseous), Scapula (trapezius)  Fluid Accumulation:  Mild Extremities   Strength:  Not Performed    Nutrition Assessment:     Chart reviewed. Pt seen at bedside. He reports eating well at breakfast, but he was not feeling well at lunch so he didn't eat anything. He is drinking \"some\" of the Ensure shakes. Pt endorses significant weight loss, UBW around 170#, but unclear how recently this was. He presents with severe fat and muscle wasting, meeting ASPEN criteria for malnutrition. Pt then notified me had had diarrhea and needed to be cleaned up, so RD notified his RN immediately. Will continue monitoring.     Patient Vitals for the past 120 hrs:   PO Meals Eaten (%)   01/23/24 1835 76 - 100%   01/23/24 1604 1 - 25%   01/21/24 1701 51 - 75%   01/21/24 1120 51 - 75%   01/21/24 0800 51 - 75%   01/20/24 1233 51 - 75%     Wt Readings from Last 5 Encounters:   01/18/24 71.6 kg (157 lb 13.6 oz)   ]    Nutrition Related Findings:    Labs reviewed.   Meds: diflucan, folic acid, zosyn, MVI, thiamine.   Edema: 1+ BLE.   BM PTA.   Wound Type: None       Current Nutrition Intake & Therapies:    Average Meal Intake: 1-25%, %  Average Supplements Intake: 26-50%  ADULT DIET; Easy to Chew  DIET ONE TIME MESSAGE;  ADULT ORAL NUTRITION  SUPPLEMENT; Breakfast, Lunch, Dinner; Standard High Calorie/High Protein Oral Supplement    Anthropometric Measures:  Height: 182.9 cm (6')  Ideal Body Weight (IBW): 178 lbs (81 kg)       Current Body Weight: 71.6 kg (157 lb 13.6 oz), 88.7 % IBW. Weight Source: Not Specified  Current BMI (kg/m2): 21.4        Weight Adjustment For: No Adjustment                 BMI Categories: Underweight (BMI less than 22) age over 65    Estimated Daily Nutrient Needs:  Energy Requirements Based On: Formula  Weight Used for Energy Requirements: Current  Energy (kcal/day): 1910 kcals (BMR x 1.3AF)  Weight Used for Protein Requirements: Current  Protein (g/day): 72-86g (1.0-1.2g/kg)  Method Used for Fluid Requirements: 1 ml/kcal  Fluid (ml/day): 1900mL    Nutrition Diagnosis:   Inadequate protein-energy intake related to  (recently decreased appetite) as evidenced by poor intake prior to admission, weight loss  Dx continues.    Nutrition Interventions:   Food and/or Nutrient Delivery: Continue Current Diet, Continue Oral Nutrition Supplement  Nutrition Education/Counseling: No recommendation at this time  Coordination of Nutrition Care: Continue to monitor while inpatient, Speech Therapy       Goals:  Previous Goal Met: Progressing toward Goal(s)  Goals: PO intake 75% or greater, by next RD assessment       Nutrition Monitoring and Evaluation:   Behavioral-Environmental Outcomes: None Identified  Food/Nutrient Intake Outcomes: Food and Nutrient Intake, Supplement Intake  Physical Signs/Symptoms Outcomes: GI Status, Biochemical Data, Nutrition Focused Physical Findings, Weight    Discharge Planning:    Continue current diet, Continue Oral Nutrition Supplement     Estela Sandhu RD  Contact: [phone]

## 2024-01-24 NOTE — CARE COORDINATION
ALISHA Note: Hospice referral placed.    3:55 placed hospice referral with [facility]. This [facility] works with [employer] who the patient has been with for HH.  He will continue to keep his 16 hours of PCA a day through [employer]. I will also send the son a list of facilities to look at. I spoke with the son and wife.    [facility] is Hospice company to follow. Did not send list as this company works with [employer] which is the company patient receives PCA hours from  [facility] has contacted wife.      English Stefano LEAVITT CM

## 2024-01-24 NOTE — PROGRESS NOTES
Occupational Therapy       Chart reviewed. Attempted to see pt for OT intervention, however, pt politely declining, reporting increased fatigue and generally feeling poorly. Provided PROM of BLEs, per pt request, prior to leaving room. Noted that hospice consult is pending and pt/family potentially interested in home w/ hospice. Will continue to follow. Thank you    Renan Whatley, OTD, OTR/L

## 2024-01-24 NOTE — PROGRESS NOTES
Chart reviewed. Attempted to see pt for PT intervention however pt politely declining, reporting increased fatigue and generally feeling poorly. Provided PROM of BLEs, per pt request, prior to leaving room. Noted that hospice consult is pending and pt/family potentially interested in home w/ hospice. Will continue to follow. Thank you    Orin Dewitt, PT , DPT

## 2024-01-24 NOTE — PROGRESS NOTES
Hospitalist Progress Note    NAME:   Derick Jane Jr   : 1945   MRN: [id number]     Date/Time: 2024 9:35 AM  Patient PCP: Brian Nur MD    Estimated discharge date: 1-2 days  Barriers: PT OT recommending SNF, improvement in liver function, heme-onc clearance      Assessment / Plan:  Sepsis  Klebsiella oxytoca bacteremia  Possible ascending cholangitis  Candidal esophagitis  Leukocytosis  Admit to stepdown unit  Continue empiric vancomycin and Zosyn  -Pharmacy consulted for vancomycin dosing  Follow cultures  De-escalate antibiotics as appropriate  Status post IV fluids  : Patient started on oral Diflucan for candidal esophagitis from last night.  Blood culture is growing Klebsiella oxytoca.  Continue with IV Zosyn.  DC vancomycin.  Will repeat blood cultures tomorrow morning.  White count is trending down at 16.6.  : He repeat blood cultures have been drawn this morning.  White count is trending down at 14.7.   : White count trending down at 13.2.  Repeat blood cultures are negative so far.  : White count up at 14.9.  The patient's blood culture has been negative since .  Will switch antibiotics to cefazolin that covers for Klebsiella.  : WBC up to 21 from 14.9. will repeat Cx. Will resume Zosyn.    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  Abnormal LFT  Anasarca secondary to above  Precipitous functional decline  Trend hepatic function panel  Gastroenterology consulted, greatly appreciate their expertise  Oncology consulted, greatly appreciate their expertise  Palliative care consulted, greatly appreciate their expertise     Summary of oncologic workup from [facility]:  Admitted -  On admission , ALT 47, alkaline phosphatase 1129, total bilirubin 18 (direct bilirubin 13) lipase WNL  Ultrasound and CT abdomen pelvis demonstrating intrahepatic biliary ductal dilatation with abrupt tapering of common hepatic duct worrisome for extrinsic    Output 3090 ml   Net -2861.52 ml          I had a face to face encounter and independently examined this patient on 1/24/2024, as outlined below:  PHYSICAL EXAM:  General: Alert, chronically ill looking, cooperative  EENT:  EOMI. icteric.  Resp:  CTA bilaterally, no wheezing or rales.  No accessory muscle use  CV:  Regular  rhythm,  No edema  GI:  Soft, Non distended, Non tender.  +Bowel sounds, bilateral percutaneous biliary drains present  Neurologic:  Alert and awale, soft speech  Psych:    Not anxious nor agitated  Skin:  Jaundiced    Reviewed most current lab test results and cultures  YES  Reviewed most current radiology test results   YES  Review and summation of old records today    NO  Reviewed patient's current orders and MAR    YES  PMH/SH reviewed - no change compared to H&P    Procedures: see electronic medical records for all procedures/Xrays and details which were not copied into this note but were reviewed prior to creation of Plan.      LABS:  I reviewed today's most current labs and imaging studies.  Pertinent labs include:  Recent Labs     01/22/24  0508 01/23/24  0031 01/24/24  0246   WBC 13.2* 14.9* 21.6*   HGB 9.6* 10.2* 9.8*   HCT 28.3* 30.4* 29.3*    296 286       Recent Labs     01/22/24  0508 01/23/24  0031 01/24/24  0246    134* 137   K 3.9 4.3 4.0   * 106 107   CO2 26 24 25   GLUCOSE 74 114* 85   BUN 23* 31* 38*   CREATININE 0.81 0.90 1.04   CALCIUM 8.6 8.7 8.8   MG 1.9 2.0 2.1   PHOS 2.7 4.4 2.9   LABALBU 1.4* 1.5* 1.6*   BILITOT 8.7* 7.4* 5.5*   * 194* 120*   ALT 46 42 45         Signed: Tabitha Mon MD

## 2024-01-25 PROBLEM — Z51.5 PALLIATIVE CARE ENCOUNTER: Status: ACTIVE | Noted: 2024-01-25

## 2024-01-25 PROBLEM — Z71.89 ADVANCED CARE PLANNING/COUNSELING DISCUSSION: Status: ACTIVE | Noted: 2024-01-25

## 2024-01-25 PROBLEM — R53.81 PHYSICAL DEBILITY: Status: ACTIVE | Noted: 2024-01-25

## 2024-01-25 PROBLEM — Z71.89 DNR (DO NOT RESUSCITATE) DISCUSSION: Status: ACTIVE | Noted: 2024-01-25

## 2024-01-25 PROBLEM — Z71.89 GOALS OF CARE, COUNSELING/DISCUSSION: Status: ACTIVE | Noted: 2024-01-25

## 2024-01-25 LAB
ALBUMIN SERPL-MCNC: 1.6 G/DL (ref 3.5–5)
ALBUMIN/GLOB SERPL: 0.4 (ref 1.1–2.2)
ALP SERPL-CCNC: 506 U/L (ref 45–117)
ALT SERPL-CCNC: 24 U/L (ref 12–78)
ANION GAP SERPL CALC-SCNC: 6 MMOL/L (ref 5–15)
AST SERPL-CCNC: 111 U/L (ref 15–37)
BACTERIA SPEC CULT: NORMAL
BILIRUB SERPL-MCNC: 6.3 MG/DL (ref 0.2–1)
BUN SERPL-MCNC: 36 MG/DL (ref 6–20)
BUN/CREAT SERPL: 40 (ref 12–20)
CALCIUM SERPL-MCNC: 8.4 MG/DL (ref 8.5–10.1)
CHLORIDE SERPL-SCNC: 106 MMOL/L (ref 97–108)
CO2 SERPL-SCNC: 22 MMOL/L (ref 21–32)
CREAT SERPL-MCNC: 0.89 MG/DL (ref 0.7–1.3)
ERYTHROCYTE [DISTWIDTH] IN BLOOD BY AUTOMATED COUNT: 16 % (ref 11.5–14.5)
GLOBULIN SER CALC-MCNC: 3.8 G/DL (ref 2–4)
GLUCOSE SERPL-MCNC: 86 MG/DL (ref 65–100)
GRAM STN SPEC: NORMAL
GRAM STN SPEC: NORMAL
HCT VFR BLD AUTO: 27.5 % (ref 36.6–50.3)
HGB BLD-MCNC: 9.1 G/DL (ref 12.1–17)
MCH RBC QN AUTO: 34.1 PG (ref 26–34)
MCHC RBC AUTO-ENTMCNC: 33.1 G/DL (ref 30–36.5)
MCV RBC AUTO: 103 FL (ref 80–99)
NRBC # BLD: 0 K/UL (ref 0–0.01)
NRBC BLD-RTO: 0 PER 100 WBC
PLATELET # BLD AUTO: 264 K/UL (ref 150–400)
PMV BLD AUTO: 10.6 FL (ref 8.9–12.9)
POTASSIUM SERPL-SCNC: 4 MMOL/L (ref 3.5–5.1)
PROT SERPL-MCNC: 5.4 G/DL (ref 6.4–8.2)
RBC # BLD AUTO: 2.67 M/UL (ref 4.1–5.7)
SERVICE CMNT-IMP: NORMAL
SODIUM SERPL-SCNC: 134 MMOL/L (ref 136–145)
WBC # BLD AUTO: 20.6 K/UL (ref 4.1–11.1)

## 2024-01-25 PROCEDURE — 2580000003 HC RX 258: Performed by: STUDENT IN AN ORGANIZED HEALTH CARE EDUCATION/TRAINING PROGRAM

## 2024-01-25 PROCEDURE — 6370000000 HC RX 637 (ALT 250 FOR IP): Performed by: INTERNAL MEDICINE

## 2024-01-25 PROCEDURE — 80053 COMPREHEN METABOLIC PANEL: CPT

## 2024-01-25 PROCEDURE — 97530 THERAPEUTIC ACTIVITIES: CPT | Performed by: OCCUPATIONAL THERAPIST

## 2024-01-25 PROCEDURE — 99232 SBSQ HOSP IP/OBS MODERATE 35: CPT | Performed by: NURSE PRACTITIONER

## 2024-01-25 PROCEDURE — 85027 COMPLETE CBC AUTOMATED: CPT

## 2024-01-25 PROCEDURE — 97530 THERAPEUTIC ACTIVITIES: CPT

## 2024-01-25 PROCEDURE — 2060000000 HC ICU INTERMEDIATE R&B

## 2024-01-25 PROCEDURE — 97535 SELF CARE MNGMENT TRAINING: CPT | Performed by: OCCUPATIONAL THERAPIST

## 2024-01-25 PROCEDURE — 2580000003 HC RX 258: Performed by: GENERAL ACUTE CARE HOSPITAL

## 2024-01-25 PROCEDURE — 6370000000 HC RX 637 (ALT 250 FOR IP): Performed by: STUDENT IN AN ORGANIZED HEALTH CARE EDUCATION/TRAINING PROGRAM

## 2024-01-25 PROCEDURE — 99232 SBSQ HOSP IP/OBS MODERATE 35: CPT | Performed by: STUDENT IN AN ORGANIZED HEALTH CARE EDUCATION/TRAINING PROGRAM

## 2024-01-25 PROCEDURE — 36415 COLL VENOUS BLD VENIPUNCTURE: CPT

## 2024-01-25 PROCEDURE — 6360000002 HC RX W HCPCS: Performed by: GENERAL ACUTE CARE HOSPITAL

## 2024-01-25 RX ADMIN — SODIUM CHLORIDE, PRESERVATIVE FREE 10 ML: 5 INJECTION INTRAVENOUS at 08:34

## 2024-01-25 RX ADMIN — CARBIDOPA AND LEVODOPA 1 TABLET: 25; 100 TABLET ORAL at 14:58

## 2024-01-25 RX ADMIN — SODIUM CHLORIDE: 9 INJECTION, SOLUTION INTRAVENOUS at 15:09

## 2024-01-25 RX ADMIN — CARBIDOPA AND LEVODOPA 1 TABLET: 25; 100 TABLET ORAL at 08:32

## 2024-01-25 RX ADMIN — FOLIC ACID 1 MG: 1 TABLET ORAL at 08:33

## 2024-01-25 RX ADMIN — APIXABAN 5 MG: 5 TABLET, FILM COATED ORAL at 08:33

## 2024-01-25 RX ADMIN — CARBIDOPA AND LEVODOPA 1 TABLET: 25; 100 TABLET ORAL at 17:23

## 2024-01-25 RX ADMIN — Medication 1 TABLET: at 08:32

## 2024-01-25 RX ADMIN — CARBIDOPA AND LEVODOPA 1 TABLET: 25; 100 TABLET ORAL at 20:42

## 2024-01-25 RX ADMIN — MIDODRINE HYDROCHLORIDE 5 MG: 5 TABLET ORAL at 08:33

## 2024-01-25 RX ADMIN — SODIUM CHLORIDE: 9 INJECTION, SOLUTION INTRAVENOUS at 23:51

## 2024-01-25 RX ADMIN — CARBIDOPA AND LEVODOPA 1 TABLET: 25; 100 TABLET ORAL at 01:16

## 2024-01-25 RX ADMIN — CARBIDOPA AND LEVODOPA 1 TABLET: 25; 100 TABLET ORAL at 11:28

## 2024-01-25 RX ADMIN — Medication 1 AMPULE: at 08:34

## 2024-01-25 RX ADMIN — Medication 100 MG: at 08:32

## 2024-01-25 RX ADMIN — FLUCONAZOLE 100 MG: 100 TABLET ORAL at 08:32

## 2024-01-25 RX ADMIN — PIPERACILLIN AND TAZOBACTAM 3375 MG: 3; .375 INJECTION, POWDER, FOR SOLUTION INTRAVENOUS; PARENTERAL at 08:36

## 2024-01-25 RX ADMIN — PIPERACILLIN AND TAZOBACTAM 3375 MG: 3; .375 INJECTION, POWDER, FOR SOLUTION INTRAVENOUS; PARENTERAL at 01:24

## 2024-01-25 RX ADMIN — SODIUM CHLORIDE, PRESERVATIVE FREE 20 ML: 5 INJECTION INTRAVENOUS at 20:53

## 2024-01-25 RX ADMIN — PIPERACILLIN AND TAZOBACTAM 3375 MG: 3; .375 INJECTION, POWDER, FOR SOLUTION INTRAVENOUS; PARENTERAL at 23:52

## 2024-01-25 RX ADMIN — SODIUM CHLORIDE, PRESERVATIVE FREE 10 ML: 5 INJECTION INTRAVENOUS at 01:19

## 2024-01-25 RX ADMIN — PIPERACILLIN AND TAZOBACTAM 3375 MG: 3; .375 INJECTION, POWDER, FOR SOLUTION INTRAVENOUS; PARENTERAL at 15:09

## 2024-01-25 RX ADMIN — CARBIDOPA AND LEVODOPA 1 TABLET: 25; 100 TABLET ORAL at 06:03

## 2024-01-25 RX ADMIN — APIXABAN 5 MG: 5 TABLET, FILM COATED ORAL at 20:42

## 2024-01-25 NOTE — PLAN OF CARE
Problem: Occupational Therapy - Adult  Goal: By Discharge: Performs self-care activities at highest level of function for planned discharge setting.  See evaluation for individualized goals.  Description: FUNCTIONAL STATUS PRIOR TO ADMISSION:     , ADL Assistance: Needs assistance,  ,  ,  ,  ,  ,  , Ambulation Assistance: Needs assistance, Transfer Assistance: Needs assistance (pt reports that he has a caregiver full time and therapy at home),   Pt lives with his wife    HOME SUPPORT: Patient lived with wife.  Has a caregiver and was receiving HH therapy.    Occupational Therapy Goals:  Initiated 1/19/2024  1.  Patient will perform grooming in standing with Minimal Assist within 7 day(s).  2.  Patient will perform bathing with Moderate Assist within 7 day(s).  3.  Patient will perform lower body adls with Moderate Assist within 7 day(s).  4.  Patient will perform toilet transfers with Moderate Assist  within 7 day(s).  5.  Patient will perform all aspects of toileting with Moderate Assist within 7 day(s).  6.  Patient will participate in upper extremity therapeutic exercise/activities with Contact Guard Assist for 5 minutes within 7 day(s).    Outcome: Not Progressing     OCCUPATIONAL THERAPY TREATMENT  Patient: Derick Jane Jr (79 y.o. 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    ASSESSMENT  Patient continues to benefit from skilled OT services and is not progressing towards goals. Patient received in bed with his daughter massaging his feet. Patient reported feeling tired however his daughter encouraging participation. With bed mobility patient complaining of right sided

## 2024-01-25 NOTE — PROGRESS NOTES
intrahepatic biliary ductal dilatation with abrupt tapering of common hepatic duct worrisome for extrinsic compression  MRCP redemonstrated intrahepatic biliary ductal dilatation with concern for underlying mass  Labs: Hep C negative, hep B nonimmune-vaccinated at [facility], CEA 9, AFP WNL, CA 19-9 1426  ERCP performed 1/2 demonstrating biliary strictures-malignant in appearance, stents were placed the hepatic duct and bifurcation were dilatated and sphincterotomy was performed  [place] samples demonstrating adenocarcinoma  FISH pending  CT chest demonstrated several prominent mediastinal hilar lymph nodes without obvious metastases     Patient appears to be having fairly precipitous functional decline.  Family and patient currently in the process of exploring therapeutic options reportedly displeased with [facility] and scheduled for second opinion at [facility] towards the end of the month.  Initially patient had elected for full code with family present.  When confirming this, patient elected for DNR.  I very explicitly confirm that this means in the event of a cardiac arrest we would not attempt to restart his heart meaning we would not provide chest compressions, electric shocks, resuscitative medications which patient confirmed with his desire.  I further clarified that in the event of respiratory arrest he would not desire a breathing tube or innervation which she confirmed.  I stated plainly that in the event of either of these scenarios not making intervention would result in his death to which she confirmed \"everyone has to die sometime\" and confirms that he did not desire these interventions to be performed.  Patient has capacity to make decisions, is able to articulate what he desires consistently, verbalizes significance of his decisions and is able to repeat them, he is alert and oriented x 4.  Given significant change from plan established 1 family was present at bedside I requested patient's primary nurse (Darci Villegas)          Signed: Brian Weaver MD

## 2024-01-25 NOTE — PROGRESS NOTES
Palliative Medicine  Patient Name: Derick Jane Jr  YOB: 1945  MRN: [id number]  Age: 79 y.o.  Gender: male    Date of Initial Consult: 1/18/2024  Date of Service: 1/23/24  Time: 4:18 PM  Provider: SABINO Flores NP  Hospital Day: 8  Admit Date: 1/18/2024  Referring Provider: Dr. Fish and Dr. Peña       Reasons for Consultation:  Goals of Care    HISTORY OF PRESENT ILLNESS (HPI):   Derick Jane Jr is a 79 y.o. male with Parkinson's disease, recent PE, recent hospitalization at [facility] and found to have suspected cholangiocarcinoma s/p two biliary stents who was admitted on 1/18/2024 from home with a diagnosis of sepsis and hyperbilirubinemia. Per GI he presents with findings compatible with acute cholangitis. He is receiving IV antibiotics. He initially had hypotension, but this has improved. GI is planning to remove the migrated CBD stents today with EGD followed by PTC with IR placed stenting in the next few days after eliquis washout. Oncology has been consulted.    1/19/2024 CT A/P:  IMPRESSION:  1. CT of the chest demonstrates bilateral effusions right greater than left and  bibasilar atelectasis.  2. CT of the abdomen and pelvis demonstrates intrahepatic ductal dilatation  which can be followed to the liver hilum where there is soft tissue density  which may represent hilar cholangiocarcinoma. A biliary stent is distal to the  level of obstruction. Results called to the nurse practitioner and GI.  3. There is small amount of ascites. There is anasarca.  4. Numerous compression fractures thoracic lumbar spine. Comparison with prior  studies would be helpful.    Psychosocial: Mr. Jane is retired from [email or web address] work. He and his wife Zulema have been together for 60 years, and  for 57; Zulema Fry is a retired . They have one grown son, Sohan.who is an . He has a \"sitter,\" Martha, from [employer] who stays with him and helps him during the  updated the ACP Navigator with Health Care Decision Maker and Patient Capacity      Primary Decision Maker: Zulema Jane - Spouse - [phone]  Confirm Advance Directive: Yes, on file    Current Code Status: Full Code     Goals of Care: Goals of Care and Interventions  Patient/Health Care Proxy Stated Goals: Rehabilitation  Medical Interventions: Full interventions  Artificially Administered Nutrition:  (TBD/Evolving discussion)       Please refer to Palliative Medicine ACP notes for further details.    PALLIATIVE ASSESSMENT:      Palliative Performance Scale (PPS):  PPS: 30    ECOG:   ECOG Status : Completely disabled [4]    Modified ESAS:  Modified-[place] Symptom Assessment Scale (ESAS)  Drowsiness Score: 3  Depression Score: Not depressed  Pain Score: No pain  Anxiety Score: Not anxious  Nausea Score: Not nauseated  Dyspnea Score: No shortness of breath    Clinical Pain Assessment (nonverbal scale for severity on nonverbal patients):   Clinical Pain Assessment  Severity: 0       NVPS:  Adult Nonverbal Pain Scale (NVPS)  Face: No particular expression or smile  Activity (Movement): Laid quietly, normal position  Guarding: Lying quietly, no positioning of hands over areas of bod  Physiology (Vital Signs): Stable vital signs  Respiratory: Baseline RR/SpO2 compliant with ventilator  NVPS Score : 0    RDOS:  RDOS  Heart rate per minute: less than 90  Respiratory rate per minute: 19 to 30  Restlessness:non-purposeful: None  Paradoxical breathing pattern:abdomen moves in on inspiration: None  Accessory muscle use: rise in clavicle during inspiration: None  Grunting at end-expiration: guttural sound: None  Nasal flaring: involuntary movement of nares: None  Look of fear: None  Total : 1      Vital Signs: Blood pressure 115/82, pulse 69, temperature 97.1 °F (36.2 °C), temperature source Axillary, resp. rate 25, height 1.829 m (6'), weight 71.6 kg (157 lb 13.6 oz), SpO2 94 %.    PHYSICAL ASSESSMENT:   General: []

## 2024-01-25 NOTE — PLAN OF CARE
Problem: SLP Adult - Impaired Swallowing  Goal: By Discharge: Advance to least restrictive diet without signs or symptoms of aspiration for planned discharge setting.  See evaluation for individualized goals.  Description: 1/20/2024  Speech path goals  1. Patient will tolerate least restrictive diet with no overt s/s of aspiration.   1/24/2024 1544 by Rivera Alfonso, SLP  Outcome: Not Progressing     Problem: SLP Adult - Impaired Swallowing  Goal: By Discharge: Advance to least restrictive diet without signs or symptoms of aspiration for planned discharge setting.  See evaluation for individualized goals.  Description: 1/20/2024  Speech path goals  1. Patient will tolerate least restrictive diet with no overt s/s of aspiration.   1/24/2024 1544 by Rivera Alfonso, SLP  Outcome: Not Progressing

## 2024-01-25 NOTE — PLAN OF CARE
Problem: Physical Therapy - Adult  Goal: By Discharge: Performs mobility at highest level of function for planned discharge setting.  See evaluation for individualized goals.  Description: FUNCTIONAL STATUS PRIOR TO ADMISSION: Pt lives with his wife at baseline, she has multiple health issues and is unable to assist. Pt mentions caregivers as well but is unsure of hours. He has recently been at [facility] and d/c'd 1/9/24 and was noted to be at a CGA to SBA level of function, uses rolling walker. He has a hx of Parkinson's.     HOME SUPPORT PRIOR TO ADMISSION: The patient lived with wife, some caregiver support.    Physical Therapy Goals  Reassessed 1/25/2024     1.  Patient will move from supine to sit and sit to supine and roll side to side in bed with moderate assistance within 7 day(s).    2.  Patient will perform sit to stand with maximal assistance within 7 day(s).  3.  Patient will transfer from bed to chair and chair to bed with maximal assistance using the least restrictive device within 7 day(s).    Initiated 1/19/2024  1.  Patient will move from supine to sit and sit to supine, scoot up and down, and roll side to side in bed with minimal assistance within 7 day(s).    2.  Patient will perform sit to stand with minimal assistance within 7 day(s).  3.  Patient will transfer from bed to chair and chair to bed with minimal assistance using the least restrictive device within 7 day(s).  4.  Patient will ambulate with minimal assistance for 25 feet with the least restrictive device within 7 day(s).   5.  Patient will ascend/descend 4 stairs with handrail(s) with minimal assistance within 7 day(s). (Note: pt does have 2 story home with bed on second level, will need to reset goal as approp)    Outcome: Not Progressing   PHYSICAL THERAPY TREATMENT: WEEKLY REASSESSMENT    Patient: Derick Meadeberta Suarez (79 y.o. male)  Date: 1/25/2024  Primary Diagnosis: Acute hyponatremia [E87.1]  Hypoglycemia [E16.2]  Cholangiocarcinoma  ([facility]) [C22.1]  Failure to thrive in adult [R62.7]  Sepsis ([facility]) [A41.9]  Procedure(s) (LRB):  EGD ESOPHAGOGASTRODUODENOSCOPY (N/A) 6 Days Post-Op   Precautions: Fall Risk, Bed Alarm                      ASSESSMENT :  Patient continues to benefit from skilled PT services and is not progressing towards goals. Pt continues to be limited by generalized weakness, decreased functional mobility, impaired seated balance, decreased tolerance to activity, increased rigidity, increased risk for falls and dependency. Pt required total A for supine<>sit EOB. Pt practiced lateral scooting along EOB with total A. Pt is functioning well below baseline. Per chart, pt is meeting with palliative/hospice team to discuss goals of care.    Patient's progression toward goals since last assessment: no goals were met    Functional Outcome Measure:  The patient scored 7 on the [facility] outcome measure which is indicative of needing additional therapy.          PLAN :  Goals have been updated based on progression since last assessment.  Patient continues to benefit from skilled intervention to address the above impairments.    Recommendations and Planned Interventions:   bed mobility training, transfer training, therapeutic exercises, neuromuscular re-education, edema management/control, patient and family training/education, and therapeutic activities    Frequency/Duration: Patient will be followed by physical therapy to address goals, PT Plan of Care: 3 times/week  per day/week to address goals.    Recommendation for discharge: (in order for the patient to meet his/her long term goals): Therapy up to 5 days/week in Skilled nursing facility; per chart, family to meet with hospice/palliative for goals of care    Other factors to consider for discharge:  final plan to be discussed with palliative/hospice team    IF patient discharges home will need the following DME: continuing to assess with progress       SUBJECTIVE:   Patient stated “I'm

## 2024-01-25 NOTE — PROGRESS NOTES
Palliative Medicine  Patient Name: Derick Jane Jr  YOB: 1945  MRN: [id number]  Age: 79 y.o.  Gender: male    Date of Initial Consult: 1/18/2024  Date of Service: 1/23/24  Time: 2:44 PM  Provider: ZION WhaleyS2  Hospital Day: 8  Admit Date: 1/18/2024  Referring Provider: Dr. Fish and Dr. Peña       Reasons for Consultation:  Goals of Care    HISTORY OF PRESENT ILLNESS (HPI):   Derick Jane Jr is a 79 y.o. male with Parkinson's disease, recent PE, recent hospitalization at [facility] and found to have suspected cholangiocarcinoma s/p two biliary stents who was admitted on 1/18/2024 from home with a diagnosis of sepsis and hyperbilirubinemia. Per GI he presents with findings compatible with acute cholangitis. He is receiving IV antibiotics. He initially had hypotension, but this has improved. GI is planning to remove the migrated CBD stents today with EGD followed by PTC with IR placed stenting in the next few days after eliquis washout. Oncology has been consulted.    1/19/2024 CT A/P:  IMPRESSION:  1. CT of the chest demonstrates bilateral effusions right greater than left and  bibasilar atelectasis.  2. CT of the abdomen and pelvis demonstrates intrahepatic ductal dilatation  which can be followed to the liver hilum where there is soft tissue density  which may represent hilar cholangiocarcinoma. A biliary stent is distal to the  level of obstruction. Results called to the nurse practitioner and GI.  3. There is small amount of ascites. There is anasarca.  4. Numerous compression fractures thoracic lumbar spine. Comparison with prior  studies would be helpful.    Psychosocial: Mr. Jane is retired from [employer] work. He and his wife Zulema have been together for 60 years, and  for 57; Zulema Fry is a retired . They have one grown son, Sohan, who is an . He has a \"sitter,\" Martha, from [employer] who stays with him and helps him during the

## 2024-01-25 NOTE — PROGRESS NOTES
1300: Bedside shift change report given to DAGMAR Light (oncoming nurse) by DAGMAR Cruz (offgoing nurse). Report included the following information Nurse Handoff Report, Intake/Output, MAR, Recent Results, and Cardiac Rhythm NSR    .

## 2024-01-25 NOTE — PROGRESS NOTES
[facility] at [facility]  [address] [facility] [address] [place] [id number]  W: [phone] F: [phone]    Reason for Visit:   Derick Jane Jr is a 79 y.o. male who is seen in consultation at the request of Dr. Roger for evaluation of recent diagnosis of cholangiocarcinoma.      Hematology / Oncology Treatment Information:     Hematological/Oncological Diagnosis: Cholangiocarcinoma     Date of Diagnosis: 1/4/24    Oncology/Hematology Treatment Course:   Treatment course:   1) ERCP with biliary stenting on 1/2/24   2) Repeat CT on 1/18/24 showed persistent intrahepatic ductal dilation concerning for hilar cholangiocarcinoma.       Pathology and Molecular Testing:     Interpretation     Suspicious for adenocarcinoma. (See comment).     Comment: The clinical history of a malignant-appearing biliary stricture is noted. The ThinPrep slides reveal numerous cohesive groups of disorganized atypical ductal cells with enlarged, irregular nuclei with some variation in nuclear size, open chromatin, prominent nucleoli, and moderate amounts of cytoplasm. Singly scattered enlarged, atypical cells with occasional binucleation are also noted in the background. Overall, the morphologic findings are suspicious for adenocarcinoma. Correlation with concurrent pancreaticobiliary FISH studies is recommended.         Initial Presentation  (HPI):     79-year-old male with recent diagnosis of cholangiocarcinoma with biliary obstruction. He presented with obstructive jaundice despite prior stent placement. Imaging showed stents migrated.    Significant medical morbidities including Parkinson's Disease. Baseline ECOG is 2-3.      He is currently following with surgical oncology at [facility].     At present, the only way to improve his hyperbilirubinemia would be with a IR guided percutaneous drain. His total bili watts would need to be less than two in order to be a candidate for  There is anterior  hepatic ductal dilatation. There is ill-defined soft tissue density in the majo  hepatis. Biliary stent is distal to the soft tissue density.  GALLBLADDER: There is high density in the gallbladder  SPLEEN: No mass.  PANCREAS: No mass or ductal dilatation.  ADRENALS: Unremarkable.  KIDNEYS: No mass, calculus, or hydronephrosis. There are bilateral renal cysts  STOMACH: Unremarkable.  SMALL BOWEL: No dilatation or wall thickening.  COLON: No dilatation or wall thickening.  APPENDIX: Not identified  PERITONEUM: There is a mild amount of ascites.. There is mild haziness in the  mesentery may represent edema  RETROPERITONEUM: No lymphadenopathy or aortic aneurysm.  REPRODUCTIVE ORGANS: Prostate is unremarkable  URINARY BLADDER: No mass or calculus.  BONES: There are degenerative changes cervical spine. There are multiple  compression deformities involving T4, T5, T9. There are compression deformities  L2, L3 and L4 similar to the CT dated 2018 there are old rib fractures  ADDITIONAL COMMENTS: There is anasarca    Impression  1. CT of the chest demonstrates bilateral effusions right greater than left and  bibasilar atelectasis.  2. CT of the abdomen and pelvis demonstrates intrahepatic ductal dilatation  which can be followed to the liver hilum where there is soft tissue density  which may represent hilar cholangiocarcinoma. A biliary stent is distal to the  level of obstruction. Results called to the nurse practitioner and GI.  3. There is small amount of ascites. There is anasarca.  4. Numerous compression fractures thoracic lumbar spine. Comparison with prior  studies would be helpful.        Assessment:     1. Hilar/intrahepatic Cholangiocarcinoma    - given overall functional status and medical comorbidities, unlikely to be a good surgical candidate  - last bilirubin trend as follows:     Lab Results   Component Value Date    BILITOT 6.3 (H) 01/25/2024    BILITOT 5.5 (H) 01/24/2024    BILITOT 7.4

## 2024-01-25 NOTE — CARE COORDINATION
Transition of Care Plan:    RUR:   Prior Level of Functioning: assistance  Disposition: hospice, home  If SNF or IPR: Date FOC offered:   Date FOC received:   Accepting facility:   Date authorization started with reference number:   Date authorization received and expires:   Follow up appointments: to be decided  DME needed: has hospital bed, walker, wheelchair  Transportation at discharge: to be arranged  IM/IMM Medicare/ letter given: family to sign at DC  Is patient a  and connected with VA?    If yes, was [place] transfer form completed and [facility] notified?   Caregiver Contact:   Zulema Jane (Spouse)  [phone] (Mobile)       Discharge Caregiver contacted prior to discharge?   Care Conference needed?   Barriers to discharge: finalizing plans for Hospice  Hospice is arranged. CM to call them today to see when they can open patient case to Hospice.    11:59 am I called [facility] to clarify Hospice is in place. They talked with wife yesterday and she does not want hospice. I will let the doctor know this.   12:39 pm I notified Dr. Weaver. If patient goes home on  he will not go with a gómez. I will try to get extra nursing hours to meet their needs at home as requested as private pay. Possible DC tomorrow.     4:02pm placed call to [employer] to see if they have private pay skilled personnel to help with the biliary tube emptying and care daily. I have not gotten a call back. I do not think they can provide this service. I called [facility] who said the pay ould be  $156.90 each visit and would not be consistent. Son said he would try to come  up with a solution to this issue. He might be able to assist but he lives in [facility] and they live in [place].   English Stefano LEAVITT CM   [phone]

## 2024-01-26 ENCOUNTER — TELEPHONE (OUTPATIENT)
Age: 79
End: 2024-01-26

## 2024-01-26 VITALS
OXYGEN SATURATION: 95 % | HEART RATE: 70 BPM | WEIGHT: 157.85 LBS | DIASTOLIC BLOOD PRESSURE: 74 MMHG | SYSTOLIC BLOOD PRESSURE: 107 MMHG | RESPIRATION RATE: 27 BRPM | HEIGHT: 72 IN | BODY MASS INDEX: 21.38 KG/M2 | TEMPERATURE: 97.3 F

## 2024-01-26 LAB
ALBUMIN SERPL-MCNC: 1.7 G/DL (ref 3.5–5)
ALBUMIN/GLOB SERPL: 0.4 (ref 1.1–2.2)
ALP SERPL-CCNC: 483 U/L (ref 45–117)
ALT SERPL-CCNC: 28 U/L (ref 12–78)
ANION GAP SERPL CALC-SCNC: 6 MMOL/L (ref 5–15)
AST SERPL-CCNC: 110 U/L (ref 15–37)
BILIRUB SERPL-MCNC: 6.1 MG/DL (ref 0.2–1)
BUN SERPL-MCNC: 30 MG/DL (ref 6–20)
BUN/CREAT SERPL: 35 (ref 12–20)
CALCIUM SERPL-MCNC: 8.2 MG/DL (ref 8.5–10.1)
CHLORIDE SERPL-SCNC: 104 MMOL/L (ref 97–108)
CO2 SERPL-SCNC: 26 MMOL/L (ref 21–32)
CREAT SERPL-MCNC: 0.85 MG/DL (ref 0.7–1.3)
ERYTHROCYTE [DISTWIDTH] IN BLOOD BY AUTOMATED COUNT: 15.7 % (ref 11.5–14.5)
GLOBULIN SER CALC-MCNC: 3.9 G/DL (ref 2–4)
GLUCOSE SERPL-MCNC: 99 MG/DL (ref 65–100)
HCT VFR BLD AUTO: 28.9 % (ref 36.6–50.3)
HGB BLD-MCNC: 9.5 G/DL (ref 12.1–17)
MCH RBC QN AUTO: 34.1 PG (ref 26–34)
MCHC RBC AUTO-ENTMCNC: 32.9 G/DL (ref 30–36.5)
MCV RBC AUTO: 103.6 FL (ref 80–99)
NRBC # BLD: 0 K/UL (ref 0–0.01)
NRBC BLD-RTO: 0 PER 100 WBC
PLATELET # BLD AUTO: 267 K/UL (ref 150–400)
PMV BLD AUTO: 10.5 FL (ref 8.9–12.9)
POTASSIUM SERPL-SCNC: 3.7 MMOL/L (ref 3.5–5.1)
PROT SERPL-MCNC: 5.6 G/DL (ref 6.4–8.2)
RBC # BLD AUTO: 2.79 M/UL (ref 4.1–5.7)
SODIUM SERPL-SCNC: 136 MMOL/L (ref 136–145)
WBC # BLD AUTO: 14.3 K/UL (ref 4.1–11.1)

## 2024-01-26 PROCEDURE — 6370000000 HC RX 637 (ALT 250 FOR IP): Performed by: STUDENT IN AN ORGANIZED HEALTH CARE EDUCATION/TRAINING PROGRAM

## 2024-01-26 PROCEDURE — 6370000000 HC RX 637 (ALT 250 FOR IP): Performed by: INTERNAL MEDICINE

## 2024-01-26 PROCEDURE — 85027 COMPLETE CBC AUTOMATED: CPT

## 2024-01-26 PROCEDURE — 6360000002 HC RX W HCPCS: Performed by: GENERAL ACUTE CARE HOSPITAL

## 2024-01-26 PROCEDURE — 2580000003 HC RX 258: Performed by: GENERAL ACUTE CARE HOSPITAL

## 2024-01-26 PROCEDURE — 2580000003 HC RX 258: Performed by: STUDENT IN AN ORGANIZED HEALTH CARE EDUCATION/TRAINING PROGRAM

## 2024-01-26 PROCEDURE — 80053 COMPREHEN METABOLIC PANEL: CPT

## 2024-01-26 PROCEDURE — 36415 COLL VENOUS BLD VENIPUNCTURE: CPT

## 2024-01-26 RX ORDER — MIDODRINE HYDROCHLORIDE 5 MG/1
5 TABLET ORAL 3 TIMES DAILY
Qty: 90 TABLET | Refills: 0 | Status: SHIPPED | OUTPATIENT
Start: 2024-01-26

## 2024-01-26 RX ORDER — METRONIDAZOLE 500 MG/1
500 TABLET ORAL 3 TIMES DAILY
Qty: 15 TABLET | Refills: 0 | Status: SHIPPED | OUTPATIENT
Start: 2024-01-26 | End: 2024-01-31

## 2024-01-26 RX ORDER — FLUCONAZOLE 100 MG/1
100 TABLET ORAL DAILY
Qty: 8 TABLET | Refills: 0 | Status: SHIPPED | OUTPATIENT
Start: 2024-01-27 | End: 2024-02-04

## 2024-01-26 RX ORDER — LEVOFLOXACIN 750 MG/1
750 TABLET, FILM COATED ORAL DAILY
Qty: 5 TABLET | Refills: 0 | Status: SHIPPED | OUTPATIENT
Start: 2024-01-26 | End: 2024-01-31

## 2024-01-26 RX ADMIN — ACETAMINOPHEN 650 MG: 325 TABLET ORAL at 01:49

## 2024-01-26 RX ADMIN — Medication 100 MG: at 08:56

## 2024-01-26 RX ADMIN — APIXABAN 5 MG: 5 TABLET, FILM COATED ORAL at 09:01

## 2024-01-26 RX ADMIN — Medication 1 AMPULE: at 09:20

## 2024-01-26 RX ADMIN — CARBIDOPA AND LEVODOPA 1 TABLET: 25; 100 TABLET ORAL at 12:06

## 2024-01-26 RX ADMIN — PIPERACILLIN AND TAZOBACTAM 3375 MG: 3; .375 INJECTION, POWDER, FOR SOLUTION INTRAVENOUS; PARENTERAL at 09:04

## 2024-01-26 RX ADMIN — Medication 1 TABLET: at 08:56

## 2024-01-26 RX ADMIN — MIDODRINE HYDROCHLORIDE 5 MG: 5 TABLET ORAL at 08:56

## 2024-01-26 RX ADMIN — PIPERACILLIN AND TAZOBACTAM 3375 MG: 3; .375 INJECTION, POWDER, FOR SOLUTION INTRAVENOUS; PARENTERAL at 14:47

## 2024-01-26 RX ADMIN — MIDODRINE HYDROCHLORIDE 5 MG: 5 TABLET ORAL at 12:06

## 2024-01-26 RX ADMIN — FOLIC ACID 1 MG: 1 TABLET ORAL at 08:56

## 2024-01-26 RX ADMIN — SODIUM CHLORIDE, PRESERVATIVE FREE 10 ML: 5 INJECTION INTRAVENOUS at 09:18

## 2024-01-26 RX ADMIN — CARBIDOPA AND LEVODOPA 1 TABLET: 25; 100 TABLET ORAL at 14:46

## 2024-01-26 RX ADMIN — CARBIDOPA AND LEVODOPA 1 TABLET: 25; 100 TABLET ORAL at 16:22

## 2024-01-26 RX ADMIN — CARBIDOPA AND LEVODOPA 1 TABLET: 25; 100 TABLET ORAL at 01:49

## 2024-01-26 RX ADMIN — CARBIDOPA AND LEVODOPA 1 TABLET: 25; 100 TABLET ORAL at 08:56

## 2024-01-26 RX ADMIN — FLUCONAZOLE 100 MG: 100 TABLET ORAL at 08:56

## 2024-01-26 ASSESSMENT — PAIN DESCRIPTION - ORIENTATION: ORIENTATION: ANTERIOR

## 2024-01-26 ASSESSMENT — PAIN DESCRIPTION - LOCATION: LOCATION: HEAD

## 2024-01-26 ASSESSMENT — PAIN - FUNCTIONAL ASSESSMENT
PAIN_FUNCTIONAL_ASSESSMENT: NONE - DENIES PAIN
PAIN_FUNCTIONAL_ASSESSMENT: NONE - DENIES PAIN
PAIN_FUNCTIONAL_ASSESSMENT: ACTIVITIES ARE NOT PREVENTED

## 2024-01-26 ASSESSMENT — PAIN DESCRIPTION - DESCRIPTORS: DESCRIPTORS: ACHING

## 2024-01-26 ASSESSMENT — PAIN SCALES - GENERAL: PAINLEVEL_OUTOF10: 5

## 2024-01-26 NOTE — PROGRESS NOTES
0800- Report received. Biliary drain tubes to gravity. VSS. Caregiver at bedside.   1545- Discharge instructions reviewed with son at bedside. Verbal understanding received. VSS. Pt denies pain. Pt to d/c to home at 5pm.

## 2024-01-26 NOTE — DISCHARGE INSTRUCTIONS
HOSPITALIST DISCHARGE INSTRUCTIONS    NAME: Derick Jane Jr   :  1945   MRN:  [id number]     Date/Time:  2024 2:58 PM    ADMIT DATE: 2024     DISCHARGE DATE: 2024     DISCHARGE DIAGNOSIS:  Sepsis/ Klebsiella oxytoca bacteremia POA- cleared on repeat Blood Cx X 2, cont PO Abx for 5 more days  Possible ascending cholangitis POA- resolved, completed  IV Abx course  Candidal esophagitis POA- cont diflucan PO  Leukocytosis POA- improved but persistent, likely due to metastatic cholangiocarcinoma  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  Abnormal LFT  Anasarca secondary to above  Precipitous functional decline -- Hospice appropriate as per oncology (not a chemo candidate in current situation)- Transition to Hospice when ready  Advanced Parkinson's disease  Autonomic instability with frequent hypotension  Moderate hyponatremia POA-  resolved  Pulmonary embolism on Eliquis  Recent COVID-19  Glaucoma  Full code for now per pt's wishes - rescinded DNR with palliative this admission    MEDICATIONS:  As per medication reconciliation  list  It is important that you take the medication exactly as they are prescribed.   Keep your medication in the bottles provided by the pharmacist and keep a list of the medication names, dosages, and times to be taken in your wallet.   Do not take other medications without consulting your doctor.     Pain Management: per above medications    What to do at Home    Recommended diet:  regular diet    Recommended activity: activity as tolerated    If you have questions regarding the hospital related prescriptions or hospital related issues please call at .    If you experience any of the following symptoms then please call your primary care physician or return to the emergency room if you cannot get hold of your doctor:  Fever, chills, nausea, vomiting, diarrhea, change in mentation, falling, bleeding, shortness of breath,     Follow

## 2024-01-26 NOTE — CARE COORDINATION
01/26/24 1431   Services At/After Discharge   Transition of Care Consult (CM Consult) Home Health   Mode of Transport at Discharge BLS   Condition of Participation: Discharge Planning   The Plan for Transition of Care is related to the following treatment goals: Specialist and PCP     Patient to be followed by [facility] for SN OT PT. He has PCA for ADL assistance.  Transport with [facility] [employer] at 5 pm to home.  Family aware patient discharged home today    English Stefano LEAVITT CM   [phone]

## 2024-01-26 NOTE — PROGRESS NOTES
Spiritual Care Assessment/Progress Note  [facility]    Name: Derick Jane Jr MRN: [id number]    Age: 79 y.o.     Sex: male   Language: English     Date: 1/26/2024            Total Time Calculated: 42 min              Spiritual Assessment begun in [facility] 2 CARDIOPULMONARY CARE  Service Provided For:: Patient  Referral/Consult From:: Palliative Care  Encounter Overview/Reason : Palliative Care    Spiritual beliefs:      [x] Involved in a christelle tradition/spiritual practice: Presybeterian     [] Supported by a christelle community:      [] Claims no spiritual orientation:      [] Seeking spiritual identity:           [] Adheres to an individual form of spirituality:      [] Not able to assess:                Identified resources for coping and support system:   Support System: Spouse, Children       [x] Prayer                  [x] Devotional reading               [x] Music                  [] Guided Imagery     [] Pet visits                                        [] Other: (COMMENT)     Specific area/focus of visit   Encounter:    Crisis:    Spiritual/Emotional needs: Type: Spiritual Support  Ritual, Rites and Sacraments:    Grief, Loss, and Adjustments:    Ethics/Mediation:    Behavioral Health:    Palliative Care: Type: Palliative Care, Initial/Spiritual Assessment  Advance Care Planning:           Narrative:   Met with Mr. Jane.  Provided listening presence, prayer and conversation. He is christelle-filled and was grateful for this  visit.     SIREAN Osullivan.  PRN    paging service [phone]

## 2024-01-26 NOTE — PROGRESS NOTES
0000 Biliary drain education provided to patient's caregiver. Patient approved of this RN providing caregiver Alyssa education regarding the emptying of biliary drains. Education provided via teachback method and caregiver able to appropriately demonstrate and empty drains.

## 2024-01-26 NOTE — PLAN OF CARE
Problem: Pain  Goal: Verbalizes/displays adequate comfort level or baseline comfort level  Outcome: Progressing  Flowsheets (Taken 1/25/2024 1945)  Verbalizes/displays adequate comfort level or baseline comfort level:   Encourage patient to monitor pain and request assistance   Assess pain using appropriate pain scale   Administer analgesics based on type and severity of pain and evaluate response     Problem: Safety - Adult  Goal: Free from fall injury  Outcome: Progressing     Problem: Discharge Planning  Goal: Discharge to home or other facility with appropriate resources  Outcome: Progressing     Problem: Skin/Tissue Integrity  Goal: Absence of new skin breakdown  Description: 1.  Monitor for areas of redness and/or skin breakdown  2.  Assess vascular access sites hourly  3.  Every 4-6 hours minimum:  Change oxygen saturation probe site  4.  Every 4-6 hours:  If on nasal continuous positive airway pressure, respiratory therapy assess nares and determine need for appliance change or resting period.  Outcome: Progressing

## 2024-01-26 NOTE — PLAN OF CARE
Problem: Pain  Goal: Verbalizes/displays adequate comfort level or baseline comfort level  Outcome: Adequate for Discharge     Problem: Safety - Adult  Goal: Free from fall injury  Outcome: Adequate for Discharge     Problem: Discharge Planning  Goal: Discharge to home or other facility with appropriate resources  Outcome: Adequate for Discharge     Problem: Skin/Tissue Integrity  Goal: Absence of new skin breakdown  Description: 1.  Monitor for areas of redness and/or skin breakdown  2.  Assess vascular access sites hourly  3.  Every 4-6 hours minimum:  Change oxygen saturation probe site  4.  Every 4-6 hours:  If on nasal continuous positive airway pressure, respiratory therapy assess nares and determine need for appliance change or resting period.  Outcome: Adequate for Discharge

## 2024-01-26 NOTE — DISCHARGE SUMMARY
Discharge Summary    Name: Derick Jane Jr  [id number]  YOB: 1945 (Age: 79 y.o.)   Date of Admission: 1/18/2024  Date of Discharge: 1/26/2024  Attending Physician: Brian Weaver MD    Discharge Diagnosis:   Sepsis/ Klebsiella oxytoca bacteremia POA- cleared on repeat Blood Cx X 2, cont PO Abx for 5 more days  Possible ascending cholangitis POA- resolved, completed  IV Abx course  Candidal esophagitis POA- cont diflucan PO  Leukocytosis POA- improved but persistent, likely due to metastatic cholangiocarcinoma  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  Abnormal LFT  Anasarca secondary to above  Precipitous functional decline -- Hospice appropriate as per oncology (not a chemo candidate in current situation)- Transition to Hospice when ready  Advanced Parkinson's disease  Autonomic instability with frequent hypotension  Moderate hyponatremia POA-  resolved  Pulmonary embolism on Eliquis  Recent COVID-19  Glaucoma  Full code for now per pt's wishes - rescinded DNR with palliative this admission    Consultations:  IP CONSULT TO PALLIATIVE CARE  IP CONSULT TO PHARMACY  IP CONSULT TO ONCOLOGY  IP CONSULT TO PALLIATIVE CARE  IP CONSULT TO GI  IP CONSULT TO DIETITIAN  IP CONSULT TO HOSPICE  IP CONSULT HOME HEALTH      Brief Admission History/Reason for Admission Per Gavino Peña, DO:   \" 79 y.o.  male with PMHx as listed below presenting to the emergency department with complaints of progressive weakness, poor p.o. intake, shortness of breath, hypertension (hypotensive in ED), and fairly rapid functional decline.  Wife also reported episodes of shaking at home concerning for rigors versus parkinsonian tremor.  Patient does report sensation of alternating chills/fevers today.  Denies focal symptoms.  [facility] with [facility] 12[facility]-1/9 at which time he was found to have painless obstructive jaundice likely secondary to  down to 5.5 from 7.4   Family requested to talk to Hospice to understands available options  1/25: Declined hospice after initially agreeable to it- HH being opted for now with future transition to hospice as recommended by Oncology Dr Espinoza- not chemo candidate- family aware   1/26: ready to go home with HH today    Advanced Parkinson's disease  Continue PTA Sinemet- takes Q 2 hrs per family-- dose adjusted     Autonomic instability with frequent hypotension  Cont Scheduled midodrine TID  Albumin bolus as needed hypotension     Moderate hyponatremia  Trend sodium-suspect hypovolemic hyponatremia with delusional component given hepatic dysfunction     Pulmonary embolism on Eliquis  Continue PTA Eliquis     Recent COVID-19  Status post course of remdesivir diagnosed on 1/1     Glaucoma  Continue PTA latanoprost    Discharge Exam:  Patient seen and examined by me on discharge day.  Pertinent Findings:  Patient Vitals for the past 24 hrs:   BP Temp Temp src Pulse Resp SpO2   01/26/24 1210 114/72 97.6 °F (36.4 °C) Axillary 65 26 95 %   01/26/24 0752 121/77 97.2 °F (36.2 °C) Oral 68 23 91 %   01/26/24 0336 116/71 98.5 °F (36.9 °C) -- 71 24 --   01/25/24 2302 92/61 98.7 °F (37.1 °C) -- 63 15 --   01/25/24 2100 109/65 -- -- 66 28 --   01/1945 91/60 98.3 °F (36.8 °C) Oral 70 24 --   01/25/24 1500 115/82 97.1 °F (36.2 °C) Axillary 69 -- 94 %       Gen:    Not in distress, Severely Icteric +  Chest: Clear lungs  CVS:   Regular rhythm.  No edema  Abd:  Soft, not distended, not tender  Neuro: awake, moving all exts    Discharge/Recent Laboratory Results:  Recent Labs     01/24/24  0246 01/25/24  0126 01/26/24  0000      < > 136   K 4.0   < > 3.7      < > 104   CO2 25   < > 26   BUN 38*   < > 30*   CREATININE 1.04   < > 0.85   GLUCOSE 85   < > 99   CALCIUM 8.8   < > 8.2*   PHOS 2.9  --   --    MG 2.1  --   --     < > = values in this interval not displayed.     Recent Labs     01/26/24  0000   HGB 9.5*   HCT

## 2024-01-26 NOTE — PALLIATIVE CARE DISCHARGE
Goals of Care/Treatment Preferences    The Palliative Medicine team was consulted as part of your/your loved one's care in the hospital. Our team is a supportive service; we strive to relieve suffering and improve quality of life.    We reviewed advance care planning information, which includes the following:    Primary Decision Maker: Zulema Jane - [facility] - [phone]      Patient/Health Care Proxy Stated Goals: Rehabilitation    We reviewed / discussed your code status as:   Code Status: Full Code     “Full Code” means perform CPR in the event of cardiac arrest.      “DNR” means do NOT perform CPR in the event of cardiac arrest.      “Partial Code” means you have specific preferences, please discuss with your healthcare team.      “No Order” means this issue was not addressed / resolved during your stay    Medical Interventions: Full interventions    Artificially Administered Nutrition:  (TBD/Evolving discussion)    Because of the importance of this information, we are providing you with a printed copy to share with other healthcare providers after this hospitalization is complete.    Thank you for including Palliative team in your care, Mr. Jane.    Iqra Pacheco, [facility]  Palliative Medicine [phone]          
Parent

## 2024-01-26 NOTE — TELEPHONE ENCOUNTER
Called pt went straight to VM. Left VM to call office back.    (If pt calls back see if he can do a VV at 1 on 2/1/24. New onc pt)

## 2024-01-27 LAB
BACTERIA SPEC CULT: NORMAL
BACTERIA SPEC CULT: NORMAL
SERVICE CMNT-IMP: NORMAL
SERVICE CMNT-IMP: NORMAL

## 2024-01-29 ENCOUNTER — TELEPHONE (OUTPATIENT)
Age: 79
End: 2024-01-29

## 2024-01-29 ENCOUNTER — TELEMEDICINE (OUTPATIENT)
Age: 79
End: 2024-01-29
Payer: MEDICARE

## 2024-01-29 DIAGNOSIS — C22.1 CHOLANGIOCARCINOMA (HCC): Primary | ICD-10-CM

## 2024-01-29 PROCEDURE — 99214 OFFICE O/P EST MOD 30 MIN: CPT | Performed by: STUDENT IN AN ORGANIZED HEALTH CARE EDUCATION/TRAINING PROGRAM

## 2024-01-29 PROCEDURE — 1036F TOBACCO NON-USER: CPT | Performed by: STUDENT IN AN ORGANIZED HEALTH CARE EDUCATION/TRAINING PROGRAM

## 2024-01-29 PROCEDURE — G8427 DOCREV CUR MEDS BY ELIG CLIN: HCPCS | Performed by: STUDENT IN AN ORGANIZED HEALTH CARE EDUCATION/TRAINING PROGRAM

## 2024-01-29 PROCEDURE — G8484 FLU IMMUNIZE NO ADMIN: HCPCS | Performed by: STUDENT IN AN ORGANIZED HEALTH CARE EDUCATION/TRAINING PROGRAM

## 2024-01-29 PROCEDURE — 1111F DSCHRG MED/CURRENT MED MERGE: CPT | Performed by: STUDENT IN AN ORGANIZED HEALTH CARE EDUCATION/TRAINING PROGRAM

## 2024-01-29 PROCEDURE — 1124F ACP DISCUSS-NO DSCNMKR DOCD: CPT | Performed by: STUDENT IN AN ORGANIZED HEALTH CARE EDUCATION/TRAINING PROGRAM

## 2024-01-29 PROCEDURE — G8420 CALC BMI NORM PARAMETERS: HCPCS | Performed by: STUDENT IN AN ORGANIZED HEALTH CARE EDUCATION/TRAINING PROGRAM

## 2024-01-29 NOTE — TELEPHONE ENCOUNTER
Pt spouse called stating the pt was released from the hospital on Friday. She was informed that a nurse would visit the pt over the weekend. Pt hasn't not been seen. Pt right side port is draining. She would like to speak with someone about this. Request a call back.    # [phone]

## 2024-01-29 NOTE — CARE COORDINATION
Received call from patient's son. He was very upset because patient was not seen Sunday and the biliary tubes were saturating the dressing and HH did not change dressing. It is still saturated today. I called [facility] and they said they did not have orders until today and could not address the problem. They will call the family today and try to go tomorrow. Adonis was called and she will contact the family to determine plan. English Stefano LEAVITT CM   [phone]

## 2024-01-29 NOTE — PROGRESS NOTES
Derick Jane Jr, was evaluated through a synchronous (real-time) audio-video encounter. The patient (or guardian if applicable) is aware that this is a billable service, which includes applicable co-pays. This Virtual Visit was conducted with patient's (and/or legal guardian's) consent. Patient identification was verified, and a caregiver was present when appropriate.   The patient was located at Home: [address] [facility] [address]  Provider was located at Facility (Appt Dept): [address] [place] [address] [address]  [place] [address]      Derick Jane Jr (:  1945) is a New patient, presenting virtually for evaluation of the following:  Chief Complaint   Patient presents with    New Patient    Cancer     cholangiocarcinoma   Mr. Jane is here today as a new pt. He reports yellow skin and would like his port flushed spouse concerned about infection. She is also concerned about left drain not working She reports weight loss but denies pale stools pain in abdomen. She has numerous concerns that she reports she would like to discuss with provider.     Patient-Reported Vitals  BP Observations: No, remote/electronic monitoring device was not used or able to be verified           --Roldan Deshpande MA

## 2024-01-29 NOTE — TELEPHONE ENCOUNTER
[facility] called and stated that they needs order on what they need to do with this patient   #[phone]

## 2024-01-29 NOTE — PROGRESS NOTES
Cancer [place] at [facility]  [address] [facility] [address] [place] [id number]  W: [phone] F: [phone]    Reason for Visit:   Derick Jane Jr is a 79 y.o. male who is seen in consultation at the request of Dr. Roger for evaluation of recent diagnosis of cholangiocarcinoma.      Hematology / Oncology Treatment Information:     Hematological/Oncological Diagnosis: Cholangiocarcinoma     Date of Diagnosis: 1/4/24    Oncology/Hematology Treatment Course:   Treatment course:   1) ERCP with biliary stenting on 1/2/24   2) Repeat CT on 1/18/24 showed persistent intrahepatic ductal dilation concerning for hilar cholangiocarcinoma.       Pathology and Molecular Testing:     Interpretation     Suspicious for adenocarcinoma. (See comment).     Comment: The clinical history of a malignant-appearing biliary stricture is noted. The ThinPrep slides reveal numerous cohesive groups of disorganized atypical ductal cells with enlarged, irregular nuclei with some variation in nuclear size, open chromatin, prominent nucleoli, and moderate amounts of cytoplasm. Singly scattered enlarged, atypical cells with occasional binucleation are also noted in the background. Overall, the morphologic findings are suspicious for adenocarcinoma. Correlation with concurrent pancreaticobiliary FISH studies is recommended.         Initial Presentation  (HPI):     79-year-old male with recent diagnosis of cholangiocarcinoma with biliary obstruction. He presented with obstructive jaundice despite prior stent placement. Imaging showed stents migrated.    Significant medical morbidities including Parkinson's Disease. Baseline ECOG is 2-3.      He is currently following with surgical oncology at [facility].     At present, the only way to improve his hyperbilirubinemia would be with a IR guided percutaneous drain. His total bili watts would need to be less than two in order to be a candidate for

## 2024-01-29 NOTE — TELEPHONE ENCOUNTER
----- Message from Antionette Uribe sent at 1/26/2024  4:02 PM EST -----  Regarding: RE: New Onc Pt  Andreia from [facility]. Stated wife called back and said that is fine.  ----- Message -----  From: Ida Addison, RN  Sent: 1/26/2024   2:54 PM EST  To: Ida DE LEON RN; Antionette Uribe  Subject: New Onc Pt                                       Antionette can you please call Mr. Jane and schedule him a VV w/ Dr. Espinoza for 2/1 @ 1pm? He will be a New Onc. Pt is currently in the hospital. TY.

## 2024-01-29 NOTE — TELEPHONE ENCOUNTER
Mckenzie from [facility] called and stated she would like an order for biliary tube for tube care and any f/u labs the patient needs. She is also able to take verbal orders.       CB# [phone]

## 2024-01-31 ENCOUNTER — TELEPHONE (OUTPATIENT)
Age: 79
End: 2024-01-31

## 2024-01-31 NOTE — TELEPHONE ENCOUNTER
Elsi from home care management called stating she has some concerns about the pt.  she would like to speak with someone about this. Request a call back.       CB# [phone]

## 2024-02-16 ENCOUNTER — TELEPHONE (OUTPATIENT)
Age: 79
End: 2024-02-16

## 2024-02-16 NOTE — TELEPHONE ENCOUNTER
----- Message from SABINO Candelaria NP sent at 2/9/2024 12:00 PM EST -----  Regarding: RE: Blood work test results   Contact: [phone]  His blood counts are improving but the CMP wasn't fully resulted on what they faxed us. Can you call [facility] and get the full results.  His bili is what we are looking for specifically.     ----- Message -----  From: Ida Addison RN  Sent: 2/9/2024   9:43 AM EST  To: SABINO Candelaria NP  Subject: FW: Blood work test results                      Please see message below.   ----- Message -----  From: Derick Jane Jr  Sent: 2/8/2024  10:36 AM EST  To: [facility] Clinical Staff  Subject: Blood work test results                          Dr. Espinoza,  [facility] has my taken blood samples to [facility] and I would appreciate your review of those lab results.     Thank you,  Derick

## 2024-02-16 NOTE — TELEPHONE ENCOUNTER
Call placed to pt's spouse.    Nurse informed pt passed away on 2/12 at home. Pt enrolled in hospice on 2/12.
